# Patient Record
Sex: FEMALE | Race: WHITE | NOT HISPANIC OR LATINO | Employment: FULL TIME | URBAN - METROPOLITAN AREA
[De-identification: names, ages, dates, MRNs, and addresses within clinical notes are randomized per-mention and may not be internally consistent; named-entity substitution may affect disease eponyms.]

---

## 2018-05-02 ENCOUNTER — OFFICE VISIT (OUTPATIENT)
Dept: OBSTETRICS AND GYNECOLOGY | Facility: CLINIC | Age: 33
End: 2018-05-02
Payer: COMMERCIAL

## 2018-05-02 VITALS
SYSTOLIC BLOOD PRESSURE: 106 MMHG | DIASTOLIC BLOOD PRESSURE: 80 MMHG | WEIGHT: 127.19 LBS | BODY MASS INDEX: 21.71 KG/M2 | HEIGHT: 64 IN

## 2018-05-02 DIAGNOSIS — Z01.419 WELL WOMAN EXAM WITH ROUTINE GYNECOLOGICAL EXAM: Primary | ICD-10-CM

## 2018-05-02 DIAGNOSIS — Z11.3 SCREENING EXAMINATION FOR STD (SEXUALLY TRANSMITTED DISEASE): ICD-10-CM

## 2018-05-02 DIAGNOSIS — Z12.4 PAP SMEAR FOR CERVICAL CANCER SCREENING: ICD-10-CM

## 2018-05-02 PROCEDURE — 99385 PREV VISIT NEW AGE 18-39: CPT | Mod: S$GLB,,, | Performed by: OBSTETRICS & GYNECOLOGY

## 2018-05-02 PROCEDURE — 99999 PR PBB SHADOW E&M-NEW PATIENT-LVL II: CPT | Mod: PBBFAC,,, | Performed by: OBSTETRICS & GYNECOLOGY

## 2018-05-02 PROCEDURE — 87491 CHLMYD TRACH DNA AMP PROBE: CPT

## 2018-05-02 PROCEDURE — 88175 CYTOPATH C/V AUTO FLUID REDO: CPT

## 2018-05-02 RX ORDER — ESCITALOPRAM OXALATE 5 MG/1
TABLET ORAL
COMMUNITY
Start: 2018-04-23 | End: 2019-12-10

## 2018-05-02 NOTE — PROGRESS NOTES
Subjective:       Patient ID: Loren Levin is a 33 y.o. female.    Chief Complaint:  Well Woman      History of Present Illness  HPI  This 33 yr old P0 female is here for routine exam and she is new to me.  She is  and definitely not ready for pregnancy.  She has been on OCPs and just stopped as does not like the hormone effect.  She wants an IUD she is very familiar with LARCs and options and very sure.  No gyn or breast ca in her family.  She has never had an abnormal pap.  Has been couple of yrs since her last pap.  Same partner for 5 yrs.  She had gardasil. We discussed risks and benefits of IUDs and other LARCs    GYN & OB History  Patient's last menstrual period was 04/10/2018.   Date of Last Pap: No result found    OB History    Para Term  AB Living   1       1     SAB TAB Ectopic Multiple Live Births     1            # Outcome Date GA Lbr Rayo/2nd Weight Sex Delivery Anes PTL Lv   1 TAB                   Review of Systems  Review of Systems   Constitutional: Negative for chills and fever.   Respiratory: Negative for shortness of breath.    Cardiovascular: Negative for chest pain.   Gastrointestinal: Negative for abdominal pain, nausea and vomiting.   Genitourinary: Negative for difficulty urinating, dyspareunia, genital sores, menstrual problem, pelvic pain, vaginal bleeding, vaginal discharge and vaginal pain.   Skin: Negative for wound.   Hematological: Negative for adenopathy.           Objective:    Physical Exam:   Constitutional: She is oriented to person, place, and time. She appears well-developed and well-nourished.    HENT:   Head: Normocephalic.    Eyes: EOM are normal.    Neck: Normal range of motion.    Cardiovascular: Normal rate.     Pulmonary/Chest: Effort normal. She exhibits no mass and no tenderness. Right breast exhibits no inverted nipple, no mass, no skin change and no tenderness. Left breast exhibits no inverted nipple, no mass, no skin change and no tenderness.         Abdominal: Soft. She exhibits no distension. There is no tenderness.     Genitourinary: Vagina normal and uterus normal. There is no rash, tenderness or lesion on the right labia. There is no rash, tenderness or lesion on the left labia. Uterus is not tender. Cervix is normal. Right adnexum displays no mass, no tenderness and no fullness. Left adnexum displays no mass, no tenderness and no fullness. Cervix exhibits no discharge.           Musculoskeletal: Normal range of motion.       Neurological: She is alert and oriented to person, place, and time.    Skin: Skin is warm and dry.    Psychiatric: She has a normal mood and affect.          Assessment:        1. Well woman exam with routine gynecological exam    2. Pap smear for cervical cancer screening               Plan:      Routine pap and follow up for paragard insertion  Culture done

## 2018-05-03 LAB
C TRACH DNA SPEC QL NAA+PROBE: NOT DETECTED
N GONORRHOEA DNA SPEC QL NAA+PROBE: NOT DETECTED

## 2018-05-07 ENCOUNTER — TELEPHONE (OUTPATIENT)
Dept: OBSTETRICS AND GYNECOLOGY | Facility: CLINIC | Age: 33
End: 2018-05-07

## 2018-05-07 NOTE — TELEPHONE ENCOUNTER
----- Message from Hari Schreiber sent at 5/7/2018 12:42 PM CDT -----  Contact: RADHA LABOY [[id number]]  Name of Who is Calling: RADHA LABOY [[id number]]      What is the request in detail: Patient would like to speak with staff in regards to the denial of her IUD claim. State the insurance needs the proper medical documentation (provider service [phone])      Can the clinic reply by [facility]: no      What Number to Call Back if not in [facility]: [phone]

## 2018-05-15 ENCOUNTER — PROCEDURE VISIT (OUTPATIENT)
Dept: OBSTETRICS AND GYNECOLOGY | Facility: CLINIC | Age: 33
End: 2018-05-15
Attending: OBSTETRICS & GYNECOLOGY
Payer: COMMERCIAL

## 2018-05-15 VITALS
SYSTOLIC BLOOD PRESSURE: 102 MMHG | HEIGHT: 64 IN | BODY MASS INDEX: 22.47 KG/M2 | DIASTOLIC BLOOD PRESSURE: 70 MMHG | WEIGHT: 131.63 LBS

## 2018-05-15 DIAGNOSIS — Z30.014 ENCOUNTER FOR INITIAL PRESCRIPTION OF INTRAUTERINE CONTRACEPTIVE DEVICE (IUD): Primary | ICD-10-CM

## 2018-05-15 LAB
B-HCG UR QL: NEGATIVE
CTP QC/QA: YES

## 2018-05-15 PROCEDURE — 58300 INSERT INTRAUTERINE DEVICE: CPT | Mod: S$GLB,,, | Performed by: OBSTETRICS & GYNECOLOGY

## 2018-05-15 PROCEDURE — 81025 URINE PREGNANCY TEST: CPT | Mod: S$GLB,,, | Performed by: OBSTETRICS & GYNECOLOGY

## 2018-05-15 NOTE — PROCEDURES
Insertin of IUD-Today  Date/Time: 5/15/2018 4:56 PM  Performed by: HAYDE PARISH  Authorized by: HAYDE PARISH   Preparation: Patient was prepped and draped in the usual sterile fashion.  Local anesthesia used: no    Anesthesia:  Local anesthesia used: no    Sedation:  Patient sedated: no  Patient tolerance: Patient tolerated the procedure well with no immediate complications      informed consent obtained  UPT neg  Anterior lip of cervix grasped with single toothed tenaculum  paragard inserted to 8cm and deployed under sterile conditions  Strings cut  Instruments removed  Pt tolerated well  EBL  5cc  Follow up for string check

## 2018-05-16 ENCOUNTER — TELEPHONE (OUTPATIENT)
Dept: OBSTETRICS AND GYNECOLOGY | Facility: CLINIC | Age: 33
End: 2018-05-16

## 2018-05-16 NOTE — TELEPHONE ENCOUNTER
Spoke with patient and informed her that she has no restrictions. Patient has no further questions or complaints.

## 2018-05-16 NOTE — TELEPHONE ENCOUNTER
----- Message from Rachel Kevin sent at 5/15/2018  3:58 PM CDT -----  Contact: self  Pt wants to know when she can return to (riding her bike and doing yoga)?  She can be reached at [phone].

## 2019-02-13 ENCOUNTER — TELEPHONE (OUTPATIENT)
Dept: OBSTETRICS AND GYNECOLOGY | Facility: CLINIC | Age: 34
End: 2019-02-13

## 2019-02-13 NOTE — TELEPHONE ENCOUNTER
----- Message from Rohini Nazario sent at 2/13/2019 12:28 PM CST -----  Contact: pt   Name of Who is Calling: RADHA LABOY [[id number]]    What is the request in detail: Patient is requesting an appointment for removing her IUD and talk about hormonal  birthcontrol....Please contact to further discuss and advise      Can the clinic reply by [facility]: No     What Number to [facility] Back [facility]: [phone].

## 2019-03-20 ENCOUNTER — OFFICE VISIT (OUTPATIENT)
Dept: OBSTETRICS AND GYNECOLOGY | Facility: CLINIC | Age: 34
End: 2019-03-20
Payer: COMMERCIAL

## 2019-03-20 VITALS — SYSTOLIC BLOOD PRESSURE: 112 MMHG | DIASTOLIC BLOOD PRESSURE: 80 MMHG

## 2019-03-20 DIAGNOSIS — N94.3 PMS (PREMENSTRUAL SYNDROME): Primary | ICD-10-CM

## 2019-03-20 PROCEDURE — 99999 PR PBB SHADOW E&M-EST. PATIENT-LVL II: CPT | Mod: PBBFAC,,, | Performed by: OBSTETRICS & GYNECOLOGY

## 2019-03-20 PROCEDURE — 99214 OFFICE O/P EST MOD 30 MIN: CPT | Mod: S$GLB,,, | Performed by: OBSTETRICS & GYNECOLOGY

## 2019-03-20 PROCEDURE — 99999 PR PBB SHADOW E&M-EST. PATIENT-LVL II: ICD-10-PCS | Mod: PBBFAC,,, | Performed by: OBSTETRICS & GYNECOLOGY

## 2019-03-20 PROCEDURE — 99214 PR OFFICE/OUTPT VISIT, EST, LEVL IV, 30-39 MIN: ICD-10-PCS | Mod: S$GLB,,, | Performed by: OBSTETRICS & GYNECOLOGY

## 2019-03-20 NOTE — PROGRESS NOTES
Subjective:       Patient ID: Lorne Levin is a 34 y.o. female.    Chief Complaint:  Follow-up (week before cycle suffers with depression)      History of Present Illness  HPI  This 34 yr old female is here for discussion of her birth control.  She was off of OCPs for while but on lexapro but when we placed the paragard but she has weaned off since and feels crazy the 7 days before her cycle now and would like something done.   She is planning pregnancy in next yr or so .  We had a long discussion and is classic PMS.  We discussed for over 25 min and over 50% in counseling.  We discussed three options  1.  SSRI either restart lexapro or do prozac 10 mg 10 days before cycle  2.   OCPs  3.  Progesterone micronized 300 or more, or less         Pt desires to do the progesterone  GYN & OB History  No LMP recorded.   Date of Last Pap: 2018    OB History    Para Term  AB Living   1       1     SAB TAB Ectopic Multiple Live Births     1            # Outcome Date GA Lbr Rayo/2nd Weight Sex Delivery Anes PTL Lv   1 TAB                   Review of Systems  Review of Systems        Objective:   Physical Exam  Normal vaginal vault and external genitalia  IUD strings in place     Assessment:        1. PMS (premenstrual syndrome)               Plan:      Progesterone from [facility].  Start with 300 mg and may change  Follow up after 2 cycles

## 2019-09-12 ENCOUNTER — TELEPHONE (OUTPATIENT)
Dept: OBSTETRICS AND GYNECOLOGY | Facility: CLINIC | Age: 34
End: 2019-09-12

## 2019-09-12 NOTE — TELEPHONE ENCOUNTER
----- Message from Yanick Banuelos sent at 9/12/2019  8:58 AM CDT -----  Contact: RADHA LABOY [[id number]]  Name of Who is Calling: RADHA LABOY [[id number]]      What is the request in detail: Pt is requesting to speak with clinical staff. Pt is calling to schedule annual. Please contact to further discuss and advise.       Can the clinic reply by [facility]: No       What Number to Call Back if not in [facility]: [phone]

## 2019-11-19 ENCOUNTER — PATIENT MESSAGE (OUTPATIENT)
Dept: OBSTETRICS AND GYNECOLOGY | Facility: CLINIC | Age: 34
End: 2019-11-19

## 2019-12-09 ENCOUNTER — PATIENT MESSAGE (OUTPATIENT)
Dept: OBSTETRICS AND GYNECOLOGY | Facility: CLINIC | Age: 34
End: 2019-12-09

## 2019-12-10 ENCOUNTER — OFFICE VISIT (OUTPATIENT)
Dept: OBSTETRICS AND GYNECOLOGY | Facility: CLINIC | Age: 34
End: 2019-12-10
Attending: OBSTETRICS & GYNECOLOGY
Payer: COMMERCIAL

## 2019-12-10 VITALS
WEIGHT: 120.56 LBS | BODY MASS INDEX: 20.58 KG/M2 | DIASTOLIC BLOOD PRESSURE: 78 MMHG | HEIGHT: 64 IN | SYSTOLIC BLOOD PRESSURE: 118 MMHG

## 2019-12-10 DIAGNOSIS — Z30.432 ENCOUNTER FOR REMOVAL OF INTRAUTERINE CONTRACEPTIVE DEVICE (IUD): ICD-10-CM

## 2019-12-10 DIAGNOSIS — Z01.419 WELL WOMAN EXAM WITH ROUTINE GYNECOLOGICAL EXAM: Primary | ICD-10-CM

## 2019-12-10 PROCEDURE — 58301 REMOVE INTRAUTERINE DEVICE: CPT | Mod: S$GLB,,, | Performed by: OBSTETRICS & GYNECOLOGY

## 2019-12-10 PROCEDURE — 99395 PREV VISIT EST AGE 18-39: CPT | Mod: 25,S$GLB,, | Performed by: OBSTETRICS & GYNECOLOGY

## 2019-12-10 PROCEDURE — 58301 PR REMOVE, INTRAUTERINE DEVICE: ICD-10-PCS | Mod: S$GLB,,, | Performed by: OBSTETRICS & GYNECOLOGY

## 2019-12-10 PROCEDURE — 99395 PR PREVENTIVE VISIT,EST,18-39: ICD-10-PCS | Mod: 25,S$GLB,, | Performed by: OBSTETRICS & GYNECOLOGY

## 2019-12-10 NOTE — PROGRESS NOTES
Subjective:       Patient ID: Loern Levin is a 34 y.o. female.    Chief Complaint:  Well Woman (IUD removal)      History of Present Illness  HPI  This 34 yr old P0 female is here for WWE and desires pregnancy so wants her IUD Paragard removed.  She had normal pap last yr.  Never had abnormal. She is on progesterone 300mg nightly.  Will wean off of the progesterone over next month.   She has no other complaints    GYN & OB History  Patient's last menstrual period was 2019.   Date of Last Pap: 2018    OB History    Para Term  AB Living   1       1     SAB TAB Ectopic Multiple Live Births     1            # Outcome Date GA Lbr Rayo/2nd Weight Sex Delivery Anes PTL Lv   1 TAB                Review of Systems  Review of Systems   Constitutional: Negative for chills and fever.   Respiratory: Negative for shortness of breath.    Cardiovascular: Negative for chest pain.   Gastrointestinal: Negative for abdominal pain, nausea and vomiting.   Genitourinary: Negative for difficulty urinating, dyspareunia, genital sores, menstrual problem, pelvic pain, vaginal bleeding, vaginal discharge and vaginal pain.   Skin: Negative for wound.   Hematological: Negative for adenopathy.           Objective:   Physical Exam:   Constitutional: She is oriented to person, place, and time. She appears well-developed and well-nourished.    HENT:   Head: Normocephalic.    Eyes: EOM are normal.    Neck: Normal range of motion.    Cardiovascular: Normal rate.     Pulmonary/Chest: Effort normal. She exhibits no mass and no tenderness. Right breast exhibits no inverted nipple, no mass, no skin change and no tenderness. Left breast exhibits no inverted nipple, no mass, no skin change and no tenderness.        Abdominal: Soft. She exhibits no distension. There is no tenderness.     Genitourinary: Vagina normal and uterus normal. There is no rash, tenderness or lesion on the right labia. There is no rash, tenderness or lesion  on the left labia. Uterus is not tender. Cervix is normal. Right adnexum displays no mass, no tenderness and no fullness. Left adnexum displays no mass, no tenderness and no fullness. Cervix exhibits no discharge.   Genitourinary Comments: IUD strings identified and with ring forceps, the strings were grasped and the IUD was removed with no problems.  The paragard was intact and identified by both me and my nurse and the patient.  No complications and pt tolerated the procedure well.           Musculoskeletal: Normal range of motion.       Neurological: She is alert and oriented to person, place, and time.    Skin: Skin is warm and dry.    Psychiatric: She has a normal mood and affect.          Assessment:        1. Well woman exam with routine gynecological exam    2. Encounter for removal of intrauterine contraceptive device (IUD)               Plan:      Will wean off of progesterone in next 4 weeks.  If no pregnancy in the next 6 months let us know.  Follow up when pregnant and will refer to OB

## 2020-01-11 ENCOUNTER — PATIENT MESSAGE (OUTPATIENT)
Dept: OBSTETRICS AND GYNECOLOGY | Facility: CLINIC | Age: 35
End: 2020-01-11

## 2020-05-12 ENCOUNTER — TELEPHONE (OUTPATIENT)
Dept: OBSTETRICS AND GYNECOLOGY | Facility: CLINIC | Age: 35
End: 2020-05-12

## 2020-05-12 DIAGNOSIS — Z36.3 ENCOUNTER FOR ANTENATAL SCREENING FOR MALFORMATION USING ULTRASOUND: Primary | ICD-10-CM

## 2020-05-21 ENCOUNTER — TELEPHONE (OUTPATIENT)
Dept: OBSTETRICS AND GYNECOLOGY | Facility: CLINIC | Age: 35
End: 2020-05-21

## 2020-05-21 NOTE — TELEPHONE ENCOUNTER
----- Message from Agustin Gates sent at 5/21/2020  1:25 PM CDT -----  Please call new ob pt lmp was 04/10 she has a appt on 06/15 she is having some cramping [phone]

## 2020-05-21 NOTE — TELEPHONE ENCOUNTER
LMP 04/10/20 5 weeks complaints of cramping 1/10 today and on occasions 6/10. She denies vaginal bleeding or severe pain. I advised patient cramping in early pregnancy was normal and recommendation is to hydrate. I offered her an appointment Friday to evaluate before weekend. Patient was scheduled 05/22/20 @ 1PM

## 2020-05-22 ENCOUNTER — OFFICE VISIT (OUTPATIENT)
Dept: OBSTETRICS AND GYNECOLOGY | Facility: CLINIC | Age: 35
End: 2020-05-22
Attending: OBSTETRICS & GYNECOLOGY
Payer: COMMERCIAL

## 2020-05-22 ENCOUNTER — LAB VISIT (OUTPATIENT)
Dept: LAB | Facility: OTHER | Age: 35
End: 2020-05-22
Attending: OBSTETRICS & GYNECOLOGY
Payer: COMMERCIAL

## 2020-05-22 VITALS
BODY MASS INDEX: 20.14 KG/M2 | DIASTOLIC BLOOD PRESSURE: 70 MMHG | WEIGHT: 117.94 LBS | HEIGHT: 64 IN | SYSTOLIC BLOOD PRESSURE: 110 MMHG

## 2020-05-22 DIAGNOSIS — N91.4 SECONDARY OLIGOMENORRHEA: ICD-10-CM

## 2020-05-22 DIAGNOSIS — N91.4 SECONDARY OLIGOMENORRHEA: Primary | ICD-10-CM

## 2020-05-22 DIAGNOSIS — Z32.01 POSITIVE PREGNANCY TEST: ICD-10-CM

## 2020-05-22 LAB
ABO + RH BLD: NORMAL
B-HCG UR QL: POSITIVE
BACTERIA #/AREA URNS AUTO: ABNORMAL /HPF
BASOPHILS # BLD AUTO: 0.04 K/UL (ref 0–0.2)
BASOPHILS NFR BLD: 0.6 % (ref 0–1.9)
BILIRUB UR QL STRIP: NEGATIVE
BLD GP AB SCN CELLS X3 SERPL QL: NORMAL
CLARITY UR REFRACT.AUTO: ABNORMAL
COLOR UR AUTO: YELLOW
CTP QC/QA: YES
DIFFERENTIAL METHOD: ABNORMAL
EOSINOPHIL # BLD AUTO: 0.1 K/UL (ref 0–0.5)
EOSINOPHIL NFR BLD: 2.2 % (ref 0–8)
ERYTHROCYTE [DISTWIDTH] IN BLOOD BY AUTOMATED COUNT: 11.3 % (ref 11.5–14.5)
GLUCOSE UR QL STRIP: NEGATIVE
HCG INTACT+B SERPL-ACNC: NORMAL MIU/ML
HCT VFR BLD AUTO: 43.6 % (ref 37–48.5)
HGB BLD-MCNC: 14.4 G/DL (ref 12–16)
HGB UR QL STRIP: NEGATIVE
IMM GRANULOCYTES # BLD AUTO: 0.01 K/UL (ref 0–0.04)
IMM GRANULOCYTES NFR BLD AUTO: 0.2 % (ref 0–0.5)
KETONES UR QL STRIP: NEGATIVE
LEUKOCYTE ESTERASE UR QL STRIP: ABNORMAL
LYMPHOCYTES # BLD AUTO: 1.4 K/UL (ref 1–4.8)
LYMPHOCYTES NFR BLD: 22.3 % (ref 18–48)
MCH RBC QN AUTO: 30.3 PG (ref 27–31)
MCHC RBC AUTO-ENTMCNC: 33 G/DL (ref 32–36)
MCV RBC AUTO: 92 FL (ref 82–98)
MICROSCOPIC COMMENT: ABNORMAL
MONOCYTES # BLD AUTO: 0.5 K/UL (ref 0.3–1)
MONOCYTES NFR BLD: 8.1 % (ref 4–15)
NEUTROPHILS # BLD AUTO: 4.2 K/UL (ref 1.8–7.7)
NEUTROPHILS NFR BLD: 66.6 % (ref 38–73)
NITRITE UR QL STRIP: NEGATIVE
NRBC BLD-RTO: 0 /100 WBC
PH UR STRIP: 7 [PH] (ref 5–8)
PLATELET # BLD AUTO: 204 K/UL (ref 150–350)
PMV BLD AUTO: 11.7 FL (ref 9.2–12.9)
PROT UR QL STRIP: NEGATIVE
RBC # BLD AUTO: 4.76 M/UL (ref 4–5.4)
RBC #/AREA URNS AUTO: 1 /HPF (ref 0–4)
SP GR UR STRIP: 1.01 (ref 1–1.03)
SQUAMOUS #/AREA URNS AUTO: 4 /HPF
URN SPEC COLLECT METH UR: ABNORMAL
WBC # BLD AUTO: 6.29 K/UL (ref 3.9–12.7)
WBC #/AREA URNS AUTO: 18 /HPF (ref 0–5)

## 2020-05-22 PROCEDURE — 85025 COMPLETE CBC W/AUTO DIFF WBC: CPT

## 2020-05-22 PROCEDURE — 81001 URINALYSIS AUTO W/SCOPE: CPT

## 2020-05-22 PROCEDURE — 87088 URINE BACTERIA CULTURE: CPT

## 2020-05-22 PROCEDURE — 86850 RBC ANTIBODY SCREEN: CPT

## 2020-05-22 PROCEDURE — 81025 URINE PREGNANCY TEST: CPT | Mod: S$GLB,,, | Performed by: OBSTETRICS & GYNECOLOGY

## 2020-05-22 PROCEDURE — 87340 HEPATITIS B SURFACE AG IA: CPT

## 2020-05-22 PROCEDURE — 81025 POCT URINE PREGNANCY: ICD-10-PCS | Mod: S$GLB,,, | Performed by: OBSTETRICS & GYNECOLOGY

## 2020-05-22 PROCEDURE — 3008F BODY MASS INDEX DOCD: CPT | Mod: CPTII,S$GLB,, | Performed by: OBSTETRICS & GYNECOLOGY

## 2020-05-22 PROCEDURE — 84144 ASSAY OF PROGESTERONE: CPT

## 2020-05-22 PROCEDURE — 99999 PR PBB SHADOW E&M-EST. PATIENT-LVL III: CPT | Mod: PBBFAC,,, | Performed by: OBSTETRICS & GYNECOLOGY

## 2020-05-22 PROCEDURE — 87086 URINE CULTURE/COLONY COUNT: CPT

## 2020-05-22 PROCEDURE — 87491 CHLMYD TRACH DNA AMP PROBE: CPT

## 2020-05-22 PROCEDURE — 99214 PR OFFICE/OUTPT VISIT, EST, LEVL IV, 30-39 MIN: ICD-10-PCS | Mod: S$GLB,,, | Performed by: OBSTETRICS & GYNECOLOGY

## 2020-05-22 PROCEDURE — 86703 HIV-1/HIV-2 1 RESULT ANTBDY: CPT

## 2020-05-22 PROCEDURE — 86762 RUBELLA ANTIBODY: CPT

## 2020-05-22 PROCEDURE — 84702 CHORIONIC GONADOTROPIN TEST: CPT

## 2020-05-22 PROCEDURE — 99214 OFFICE O/P EST MOD 30 MIN: CPT | Mod: S$GLB,,, | Performed by: OBSTETRICS & GYNECOLOGY

## 2020-05-22 PROCEDURE — 36415 COLL VENOUS BLD VENIPUNCTURE: CPT

## 2020-05-22 PROCEDURE — 99999 PR PBB SHADOW E&M-EST. PATIENT-LVL III: ICD-10-PCS | Mod: PBBFAC,,, | Performed by: OBSTETRICS & GYNECOLOGY

## 2020-05-22 PROCEDURE — 3008F PR BODY MASS INDEX (BMI) DOCUMENTED: ICD-10-PCS | Mod: CPTII,S$GLB,, | Performed by: OBSTETRICS & GYNECOLOGY

## 2020-05-22 PROCEDURE — 86592 SYPHILIS TEST NON-TREP QUAL: CPT

## 2020-05-22 NOTE — PROGRESS NOTES
SUBJECTIVE:   35 y.o. female   for missed period. Patient's last menstrual period was 04/10/2020 (approximate)..  She normally has regular periods.  Not using contraception.  Had + home UPT.  She reports some cramping, but no VB.  Wanted to be seen for evaluation.       UPT+  EGA 6 wga  EDC 1-    History reviewed. No pertinent past medical history.  Past Surgical History:   Procedure Laterality Date    NO PAST SURGERIES       Social History     Socioeconomic History    Marital status:      Spouse name: Not on file    Number of children: Not on file    Years of education: Not on file    Highest education level: Not on file   Occupational History    Not on file   Social Needs    Financial resource strain: Not on file    Food insecurity:     Worry: Not on file     Inability: Not on file    Transportation needs:     Medical: Not on file     Non-medical: Not on file   Tobacco Use    Smoking status: Never Smoker    Smokeless tobacco: Never Used   Substance and Sexual Activity    Alcohol use: Yes     Comment: social/ pregnancy    Drug use: No    Sexual activity: Yes     Partners: Male   Lifestyle    Physical activity:     Days per week: Not on file     Minutes per session: Not on file    Stress: Not on file   Relationships    Social connections:     Talks on phone: Not on file     Gets together: Not on file     Attends [employer] service: Not on file     Active member of club or organization: Not on file     Attends meetings of clubs or organizations: Not on file     Relationship status: Not on file   Other Topics Concern    Not on file   Social History Narrative    Not on file     Family History   Problem Relation Age of Onset    Breast cancer Neg Hx     Colon cancer Neg Hx     Ovarian cancer Neg Hx      OB History    Para Term  AB Living   2       1     SAB TAB Ectopic Multiple Live Births     1            # Outcome Date GA Lbr Rayo/2nd Weight Sex Delivery Anes PTL  "Lv   2 Current            1 TAB 07/12/10 6w0d                No current outpatient medications on file.     No current facility-administered medications for this visit.      Allergies: Patient has no known allergies.     ROS:  Constitutional: no weight loss, weight gain, fever, fatigue  Eyes:  No vision changes, glasses/contacts  ENT/Mouth: No ulcers, sinus problems, ears ringing, headache  Cardiovascular: No inability to lie flat, chest pain, exercise intolerance, swelling, heart palpitations  Respiratory: No wheezing, coughing blood, shortness of breath, or cough  Gastrointestinal: No diarrhea, bloody stool, nausea/vomiting, constipation, gas, hemorrhoids  Genitourinary: No blood in urine, painful urination, urgency of urination, frequency of urination, incomplete emptying, incontinence, painful periods, heavy periods, vaginal discharge, vaginal odor, painful intercourse, sexual problems, bleeding after intercourse.  Musculoskeletal: No muscle weakness  Skin/Breast: No painful breasts, nipple discharge, masses, rash, ulcers  Neurological: No passing out, seizures, numbness, headache  Endocrine: No diabetes, hypothyroid, hyperthyroid, hot flashes, hair loss, abnormal hair growth, ance  Psychiatric: No depression, crying  Hematologic: No bruises, bleeding, swollen lymph nodes, anemia.      OBJECTIVE:   The patient appears well, alert, oriented x 3, in no distress.  /70 (BP Location: Right arm, Patient Position: Sitting, BP Method: Medium (Manual))   Ht 5' 4" (1.626 m)   Wt 53.5 kg (117 lb 15.1 oz)   LMP 04/10/2020 (Approximate)   BMI 20.25 kg/m²   NECK: no thyromegaly, trachea midline  SKIN: no acne, striae, hirsutism  CHEST: CTAB  CV: RRR  BREAST EXAM: breasts appear normal, no suspicious masses, no skin or nipple changes or axillary nodes  ABDOMEN: no hernias, masses, or hepatosplenomegaly  GENITALIA: normal external genitalia, no erythema, no discharge  URETHRA: normal urethra, normal urethral " meatus  VAGINA: Normal  CERVIX: no lesions or cervical motion tenderness  UTERUS: normal size, contour, position, consistency, mobility, non-tender  ADNEXA: no mass, fullness, tenderness      ASSESSMENT:   1. Secondary oligomenorrhea  Urinalysis    Urine culture    CBC auto differential    Type & Screen - Ob Profile    Rubella Antibody, IgG    HIV 1/2 Ag/Ab (4th Gen)    Hepatitis B Surface Antigen    RPR    C. trachomatis/N. gonorrhoeae by AMP DNA    Progesterone    hCG, quantitative   2. Positive pregnancy test  Urinalysis    Urine culture    CBC auto differential    Type & Screen - Ob Profile    Rubella Antibody, IgG    HIV 1/2 Ag/Ab (4th Gen)    Hepatitis B Surface Antigen    RPR    C. trachomatis/N. gonorrhoeae by AMP DNA    Progesterone    hCG, quantitative    POCT urine pregnancy       PLAN:   Orders Placed This Encounter    Urine culture    C. trachomatis/N. gonorrhoeae by AMP DNA    Urinalysis    CBC auto differential    Rubella Antibody, IgG    HIV 1/2 Ag/Ab (4th Gen)    Hepatitis B Surface Antigen    RPR    Progesterone    hCG, quantitative    Urinalysis Microscopic    POCT urine pregnancy    Type & Screen - Ob Profile     Discussed new OB, ER precautions, PNV, diet, OB labs, u/s, AMA, optional genetic testing.  RTC 4 weeks

## 2020-05-23 LAB — PROGEST SERPL-MCNC: 14.6 NG/ML

## 2020-05-24 LAB — BACTERIA UR CULT: ABNORMAL

## 2020-05-25 ENCOUNTER — PATIENT MESSAGE (OUTPATIENT)
Dept: OBSTETRICS AND GYNECOLOGY | Facility: CLINIC | Age: 35
End: 2020-05-25

## 2020-05-25 LAB
C TRACH DNA SPEC QL NAA+PROBE: NOT DETECTED
HBV SURFACE AG SERPL QL IA: NEGATIVE
HIV 1+2 AB+HIV1 P24 AG SERPL QL IA: NEGATIVE
N GONORRHOEA DNA SPEC QL NAA+PROBE: NOT DETECTED
RPR SER QL: NORMAL
RUBV IGG SER-ACNC: 22.3 IU/ML
RUBV IGG SER-IMP: REACTIVE

## 2020-05-26 ENCOUNTER — TELEPHONE (OUTPATIENT)
Dept: OBSTETRICS AND GYNECOLOGY | Facility: CLINIC | Age: 35
End: 2020-05-26

## 2020-05-26 DIAGNOSIS — Z34.90 PREGNANCY, UNSPECIFIED GESTATIONAL AGE: Primary | ICD-10-CM

## 2020-06-11 ENCOUNTER — INITIAL PRENATAL (OUTPATIENT)
Dept: OBSTETRICS AND GYNECOLOGY | Facility: CLINIC | Age: 35
End: 2020-06-11
Attending: STUDENT IN AN ORGANIZED HEALTH CARE EDUCATION/TRAINING PROGRAM
Payer: COMMERCIAL

## 2020-06-11 ENCOUNTER — PROCEDURE VISIT (OUTPATIENT)
Dept: OBSTETRICS AND GYNECOLOGY | Facility: CLINIC | Age: 35
End: 2020-06-11
Payer: COMMERCIAL

## 2020-06-11 VITALS
DIASTOLIC BLOOD PRESSURE: 66 MMHG | WEIGHT: 117.94 LBS | BODY MASS INDEX: 20.25 KG/M2 | SYSTOLIC BLOOD PRESSURE: 104 MMHG

## 2020-06-11 DIAGNOSIS — Z34.90 PREGNANCY, UNSPECIFIED GESTATIONAL AGE: ICD-10-CM

## 2020-06-11 DIAGNOSIS — Z36.89 ESTABLISH GESTATIONAL AGE, ULTRASOUND: ICD-10-CM

## 2020-06-11 DIAGNOSIS — Z34.90 PREGNANCY, UNSPECIFIED GESTATIONAL AGE: Primary | ICD-10-CM

## 2020-06-11 PROCEDURE — 76801 PR US, OB <14WKS, TRANSABD, SINGLE GESTATION: ICD-10-PCS | Mod: S$GLB,,, | Performed by: OBSTETRICS & GYNECOLOGY

## 2020-06-11 PROCEDURE — 76801 OB US < 14 WKS SINGLE FETUS: CPT | Mod: S$GLB,,, | Performed by: OBSTETRICS & GYNECOLOGY

## 2020-06-11 PROCEDURE — 99999 PR PBB SHADOW E&M-EST. PATIENT-LVL III: ICD-10-PCS | Mod: PBBFAC,,, | Performed by: STUDENT IN AN ORGANIZED HEALTH CARE EDUCATION/TRAINING PROGRAM

## 2020-06-11 PROCEDURE — 99999 PR PBB SHADOW E&M-EST. PATIENT-LVL III: CPT | Mod: PBBFAC,,, | Performed by: STUDENT IN AN ORGANIZED HEALTH CARE EDUCATION/TRAINING PROGRAM

## 2020-06-11 PROCEDURE — 0500F PR INITIAL PRENATAL CARE VISIT: ICD-10-PCS | Mod: S$GLB,,, | Performed by: STUDENT IN AN ORGANIZED HEALTH CARE EDUCATION/TRAINING PROGRAM

## 2020-06-11 PROCEDURE — 0500F INITIAL PRENATAL CARE VISIT: CPT | Mod: S$GLB,,, | Performed by: STUDENT IN AN ORGANIZED HEALTH CARE EDUCATION/TRAINING PROGRAM

## 2020-06-12 ENCOUNTER — PATIENT MESSAGE (OUTPATIENT)
Dept: OBSTETRICS AND GYNECOLOGY | Facility: CLINIC | Age: 35
End: 2020-06-12

## 2020-06-15 ENCOUNTER — PATIENT MESSAGE (OUTPATIENT)
Dept: OBSTETRICS AND GYNECOLOGY | Facility: CLINIC | Age: 35
End: 2020-06-15

## 2020-06-18 ENCOUNTER — PATIENT MESSAGE (OUTPATIENT)
Dept: OBSTETRICS AND GYNECOLOGY | Facility: CLINIC | Age: 35
End: 2020-06-18

## 2020-06-18 DIAGNOSIS — R35.0 URINE FREQUENCY: ICD-10-CM

## 2020-06-18 DIAGNOSIS — R30.0 DYSURIA: Primary | ICD-10-CM

## 2020-06-19 ENCOUNTER — TELEPHONE (OUTPATIENT)
Dept: OBSTETRICS AND GYNECOLOGY | Facility: CLINIC | Age: 35
End: 2020-06-19

## 2020-06-19 NOTE — TELEPHONE ENCOUNTER
Spoke with the patient. Informed her that the orders are not electronic and that she would have to  orders, Informed the patient that I will be at the [facility] location on Tuesday, June 23rd. Patient voiced understanding and will  orders then.

## 2020-06-23 ENCOUNTER — LAB VISIT (OUTPATIENT)
Dept: LAB | Facility: OTHER | Age: 35
End: 2020-06-23
Attending: STUDENT IN AN ORGANIZED HEALTH CARE EDUCATION/TRAINING PROGRAM
Payer: COMMERCIAL

## 2020-06-23 ENCOUNTER — PATIENT MESSAGE (OUTPATIENT)
Dept: OBSTETRICS AND GYNECOLOGY | Facility: CLINIC | Age: 35
End: 2020-06-23

## 2020-06-23 DIAGNOSIS — R35.0 URINE FREQUENCY: ICD-10-CM

## 2020-06-23 DIAGNOSIS — R30.0 DYSURIA: ICD-10-CM

## 2020-06-23 PROCEDURE — 87086 URINE CULTURE/COLONY COUNT: CPT

## 2020-06-23 RX ORDER — NITROFURANTOIN 25; 75 MG/1; MG/1
100 CAPSULE ORAL 2 TIMES DAILY
Qty: 14 CAPSULE | Refills: 0 | Status: SHIPPED | OUTPATIENT
Start: 2020-06-23 | End: 2020-06-30

## 2020-06-23 NOTE — TELEPHONE ENCOUNTER
Pt has UTI and dropping of urine sample today.    Allergies and pharmacy up to date.    Macrobid pended.

## 2020-06-24 LAB — BACTERIA UR CULT: NO GROWTH

## 2020-07-09 ENCOUNTER — ROUTINE PRENATAL (OUTPATIENT)
Dept: OBSTETRICS AND GYNECOLOGY | Facility: CLINIC | Age: 35
End: 2020-07-09
Attending: STUDENT IN AN ORGANIZED HEALTH CARE EDUCATION/TRAINING PROGRAM
Payer: COMMERCIAL

## 2020-07-09 VITALS — SYSTOLIC BLOOD PRESSURE: 110 MMHG | DIASTOLIC BLOOD PRESSURE: 76 MMHG | WEIGHT: 122.38 LBS | BODY MASS INDEX: 21 KG/M2

## 2020-07-09 DIAGNOSIS — Z3A.12 12 WEEKS GESTATION OF PREGNANCY: Primary | ICD-10-CM

## 2020-07-09 PROCEDURE — 0502F SUBSEQUENT PRENATAL CARE: CPT | Mod: CPTII,S$GLB,, | Performed by: STUDENT IN AN ORGANIZED HEALTH CARE EDUCATION/TRAINING PROGRAM

## 2020-07-09 PROCEDURE — 99999 PR PBB SHADOW E&M-EST. PATIENT-LVL III: CPT | Mod: PBBFAC,,, | Performed by: STUDENT IN AN ORGANIZED HEALTH CARE EDUCATION/TRAINING PROGRAM

## 2020-07-09 PROCEDURE — 99999 PR PBB SHADOW E&M-EST. PATIENT-LVL III: ICD-10-PCS | Mod: PBBFAC,,, | Performed by: STUDENT IN AN ORGANIZED HEALTH CARE EDUCATION/TRAINING PROGRAM

## 2020-07-09 PROCEDURE — 0502F PR SUBSEQUENT PRENATAL CARE: ICD-10-PCS | Mod: CPTII,S$GLB,, | Performed by: STUDENT IN AN ORGANIZED HEALTH CARE EDUCATION/TRAINING PROGRAM

## 2020-07-09 NOTE — PROGRESS NOTES
Patient reports migraine headaches with blurred vision/spots x 2 weeks. Reports normal BP readings at home.

## 2020-07-10 NOTE — PROGRESS NOTES
Doing well.  No cramping or bleeding.  Discussed migraines for headaches - if worsens will get her into Neurology.  Interested in [facility] - will call and discuss with them.  All questions answered.  RTC in 4 weeks or sooner if needed.

## 2020-07-13 ENCOUNTER — TELEPHONE (OUTPATIENT)
Dept: OBSTETRICS AND GYNECOLOGY | Facility: CLINIC | Age: 35
End: 2020-07-13

## 2020-08-06 ENCOUNTER — PATIENT MESSAGE (OUTPATIENT)
Dept: OBSTETRICS AND GYNECOLOGY | Facility: CLINIC | Age: 35
End: 2020-08-06

## 2020-08-06 DIAGNOSIS — R19.7 DIARRHEA: ICD-10-CM

## 2020-08-10 ENCOUNTER — TELEPHONE (OUTPATIENT)
Dept: OBSTETRICS AND GYNECOLOGY | Facility: CLINIC | Age: 35
End: 2020-08-10

## 2020-08-10 ENCOUNTER — PATIENT MESSAGE (OUTPATIENT)
Dept: OBSTETRICS AND GYNECOLOGY | Facility: CLINIC | Age: 35
End: 2020-08-10

## 2020-08-10 NOTE — TELEPHONE ENCOUNTER
Pt experiencing diarrhea since 8/6. Was wondering if she should get covid test. She denied any other covid symptoms.  Monitor and maintain hydration? Or did you want covid test?    Has appt tomorrow 8/11

## 2020-08-10 NOTE — TELEPHONE ENCOUNTER
Sure happy to get her tested.  She will need to reschedule her appt if it is not back by tomorrow.  I would have her rest and hydrate as well.  How many episodes is she having?  Any emesis?  Able to keep things down?  I would have her try a bland diet - toast, rice, broth, fluids while this is resolving.  Thanks so much!

## 2020-08-10 NOTE — TELEPHONE ENCOUNTER
I would go ahead and do the testing.  Let's just make sure we are not missing anything since it has been going on for so long.  We can then get her in to see me next week.  Does she need me to call her?  Thanks so much!

## 2020-08-11 ENCOUNTER — PATIENT MESSAGE (OUTPATIENT)
Dept: OBSTETRICS AND GYNECOLOGY | Facility: CLINIC | Age: 35
End: 2020-08-11

## 2020-08-11 NOTE — TELEPHONE ENCOUNTER
Called patient to reschedule appointment to next week. Patient states she will return call to reschedule, currently in a zoom meeting.

## 2020-08-11 NOTE — TELEPHONE ENCOUNTER
Spoke with patient.  She is feeling better now and symptoms have resolved.  Was not able to get COVID testing at urgent care.  She will try at [facility] otherwise will continue to monitor and avoid contact.s  She will call if symptoms return and then can pursue further testing.  All questions answered.

## 2020-08-18 ENCOUNTER — ROUTINE PRENATAL (OUTPATIENT)
Dept: OBSTETRICS AND GYNECOLOGY | Facility: CLINIC | Age: 35
End: 2020-08-18
Attending: STUDENT IN AN ORGANIZED HEALTH CARE EDUCATION/TRAINING PROGRAM
Payer: COMMERCIAL

## 2020-08-18 ENCOUNTER — LAB VISIT (OUTPATIENT)
Dept: LAB | Facility: OTHER | Age: 35
End: 2020-08-18
Attending: STUDENT IN AN ORGANIZED HEALTH CARE EDUCATION/TRAINING PROGRAM
Payer: COMMERCIAL

## 2020-08-18 VITALS
DIASTOLIC BLOOD PRESSURE: 72 MMHG | BODY MASS INDEX: 21.51 KG/M2 | WEIGHT: 125.31 LBS | SYSTOLIC BLOOD PRESSURE: 108 MMHG

## 2020-08-18 DIAGNOSIS — R19.7 DIARRHEA, UNSPECIFIED TYPE: ICD-10-CM

## 2020-08-18 DIAGNOSIS — R19.7 DIARRHEA, UNSPECIFIED TYPE: Primary | ICD-10-CM

## 2020-08-18 LAB
ALBUMIN SERPL BCP-MCNC: 3 G/DL (ref 3.5–5.2)
ALP SERPL-CCNC: 50 U/L (ref 55–135)
ALT SERPL W/O P-5'-P-CCNC: 11 U/L (ref 10–44)
ANION GAP SERPL CALC-SCNC: 9 MMOL/L (ref 8–16)
AST SERPL-CCNC: 37 U/L (ref 10–40)
BASOPHILS # BLD AUTO: 0.03 K/UL (ref 0–0.2)
BASOPHILS NFR BLD: 0.3 % (ref 0–1.9)
BILIRUB SERPL-MCNC: 0.3 MG/DL (ref 0.1–1)
BUN SERPL-MCNC: 17 MG/DL (ref 6–20)
CALCIUM SERPL-MCNC: 8.6 MG/DL (ref 8.7–10.5)
CHLORIDE SERPL-SCNC: 105 MMOL/L (ref 95–110)
CO2 SERPL-SCNC: 22 MMOL/L (ref 23–29)
CREAT SERPL-MCNC: 0.6 MG/DL (ref 0.5–1.4)
DIFFERENTIAL METHOD: ABNORMAL
EOSINOPHIL # BLD AUTO: 0.1 K/UL (ref 0–0.5)
EOSINOPHIL NFR BLD: 1.2 % (ref 0–8)
ERYTHROCYTE [DISTWIDTH] IN BLOOD BY AUTOMATED COUNT: 12.4 % (ref 11.5–14.5)
EST. GFR  (AFRICAN AMERICAN): >60 ML/MIN/1.73 M^2
EST. GFR  (NON AFRICAN AMERICAN): >60 ML/MIN/1.73 M^2
GLUCOSE SERPL-MCNC: 71 MG/DL (ref 70–110)
HCT VFR BLD AUTO: 35.5 % (ref 37–48.5)
HGB BLD-MCNC: 12.1 G/DL (ref 12–16)
IMM GRANULOCYTES # BLD AUTO: 0.02 K/UL (ref 0–0.04)
IMM GRANULOCYTES NFR BLD AUTO: 0.2 % (ref 0–0.5)
LYMPHOCYTES # BLD AUTO: 1 K/UL (ref 1–4.8)
LYMPHOCYTES NFR BLD: 11.1 % (ref 18–48)
MCH RBC QN AUTO: 31.8 PG (ref 27–31)
MCHC RBC AUTO-ENTMCNC: 34.1 G/DL (ref 32–36)
MCV RBC AUTO: 93 FL (ref 82–98)
MONOCYTES # BLD AUTO: 0.6 K/UL (ref 0.3–1)
MONOCYTES NFR BLD: 6.3 % (ref 4–15)
NEUTROPHILS # BLD AUTO: 7.6 K/UL (ref 1.8–7.7)
NEUTROPHILS NFR BLD: 80.9 % (ref 38–73)
NRBC BLD-RTO: 0 /100 WBC
PLATELET # BLD AUTO: 189 K/UL (ref 150–350)
PMV BLD AUTO: 11.3 FL (ref 9.2–12.9)
POTASSIUM SERPL-SCNC: 4 MMOL/L (ref 3.5–5.1)
PROT SERPL-MCNC: 6.3 G/DL (ref 6–8.4)
RBC # BLD AUTO: 3.8 M/UL (ref 4–5.4)
SODIUM SERPL-SCNC: 136 MMOL/L (ref 136–145)
WBC # BLD AUTO: 9.34 K/UL (ref 3.9–12.7)

## 2020-08-18 PROCEDURE — 36415 COLL VENOUS BLD VENIPUNCTURE: CPT

## 2020-08-18 PROCEDURE — 99999 PR PBB SHADOW E&M-EST. PATIENT-LVL III: CPT | Mod: PBBFAC,,, | Performed by: STUDENT IN AN ORGANIZED HEALTH CARE EDUCATION/TRAINING PROGRAM

## 2020-08-18 PROCEDURE — 0502F SUBSEQUENT PRENATAL CARE: CPT | Mod: CPTII,S$GLB,, | Performed by: STUDENT IN AN ORGANIZED HEALTH CARE EDUCATION/TRAINING PROGRAM

## 2020-08-18 PROCEDURE — 80053 COMPREHEN METABOLIC PANEL: CPT

## 2020-08-18 PROCEDURE — 0502F PR SUBSEQUENT PRENATAL CARE: ICD-10-PCS | Mod: CPTII,S$GLB,, | Performed by: STUDENT IN AN ORGANIZED HEALTH CARE EDUCATION/TRAINING PROGRAM

## 2020-08-18 PROCEDURE — 85025 COMPLETE CBC W/AUTO DIFF WBC: CPT

## 2020-08-18 PROCEDURE — 99999 PR PBB SHADOW E&M-EST. PATIENT-LVL III: ICD-10-PCS | Mod: PBBFAC,,, | Performed by: STUDENT IN AN ORGANIZED HEALTH CARE EDUCATION/TRAINING PROGRAM

## 2020-08-19 ENCOUNTER — LAB VISIT (OUTPATIENT)
Dept: LAB | Facility: OTHER | Age: 35
End: 2020-08-19
Attending: STUDENT IN AN ORGANIZED HEALTH CARE EDUCATION/TRAINING PROGRAM
Payer: COMMERCIAL

## 2020-08-19 DIAGNOSIS — R19.7 DIARRHEA, UNSPECIFIED TYPE: ICD-10-CM

## 2020-08-19 LAB — OB PNL STL: NEGATIVE

## 2020-08-19 PROCEDURE — 87045 FECES CULTURE AEROBIC BACT: CPT

## 2020-08-19 PROCEDURE — 87329 GIARDIA AG IA: CPT

## 2020-08-19 PROCEDURE — 87209 SMEAR COMPLEX STAIN: CPT

## 2020-08-19 PROCEDURE — 87046 STOOL CULTR AEROBIC BACT EA: CPT | Mod: 59

## 2020-08-19 PROCEDURE — 89055 LEUKOCYTE ASSESSMENT FECAL: CPT

## 2020-08-19 PROCEDURE — 87449 NOS EACH ORGANISM AG IA: CPT

## 2020-08-19 PROCEDURE — 87324 CLOSTRIDIUM AG IA: CPT

## 2020-08-19 PROCEDURE — 82272 OCCULT BLD FECES 1-3 TESTS: CPT

## 2020-08-19 PROCEDURE — 87427 SHIGA-LIKE TOXIN AG IA: CPT | Mod: 59

## 2020-08-19 PROCEDURE — 87425 ROTAVIRUS AG IA: CPT

## 2020-08-19 NOTE — PROGRESS NOTES
Doing well.  Still with some diarrhea.  COVID negative.  Going on for 2 weeks.  Denies any triggers.  Will get labs and stool studies.  She is also interested in [facility] - will call about appt.  Anatomy scheduled.  All questions answered.  ED precautions reviewed.  RTC in 4 weeks or sooner if needed.

## 2020-08-20 ENCOUNTER — TELEPHONE (OUTPATIENT)
Dept: OBSTETRICS AND GYNECOLOGY | Facility: CLINIC | Age: 35
End: 2020-08-20

## 2020-08-20 LAB
C DIFF GDH STL QL: NEGATIVE
C DIFF TOX A+B STL QL IA: NEGATIVE
CRYPTOSP AG STL QL IA: NEGATIVE
G LAMBLIA AG STL QL IA: NEGATIVE
RV AG STL QL IA.RAPID: NEGATIVE
WBC #/AREA STL HPF: NORMAL /[HPF]

## 2020-08-21 ENCOUNTER — PATIENT MESSAGE (OUTPATIENT)
Dept: OBSTETRICS AND GYNECOLOGY | Facility: CLINIC | Age: 35
End: 2020-08-21

## 2020-08-21 DIAGNOSIS — Z3A.19 19 WEEKS GESTATION OF PREGNANCY: Primary | ICD-10-CM

## 2020-08-21 LAB
E COLI SXT1 STL QL IA: NEGATIVE
E COLI SXT2 STL QL IA: NEGATIVE

## 2020-08-22 LAB — O+P STL MICRO: NORMAL

## 2020-08-24 LAB — BACTERIA STL CULT: NORMAL

## 2020-08-25 ENCOUNTER — INITIAL PRENATAL (OUTPATIENT)
Dept: OBSTETRICS AND GYNECOLOGY | Facility: CLINIC | Age: 35
End: 2020-08-25
Payer: COMMERCIAL

## 2020-08-25 VITALS — DIASTOLIC BLOOD PRESSURE: 60 MMHG | SYSTOLIC BLOOD PRESSURE: 92 MMHG | WEIGHT: 128.19 LBS | BODY MASS INDEX: 22.01 KG/M2

## 2020-08-25 DIAGNOSIS — Z34.90 PREGNANCY WITH ONE FETUS, ANTEPARTUM: ICD-10-CM

## 2020-08-25 PROCEDURE — 99999 PR PBB SHADOW E&M-EST. PATIENT-LVL III: CPT | Mod: PBBFAC,,, | Performed by: ADVANCED PRACTICE MIDWIFE

## 2020-08-25 PROCEDURE — 0500F PR INITIAL PRENATAL CARE VISIT: ICD-10-PCS | Mod: S$GLB,,, | Performed by: ADVANCED PRACTICE MIDWIFE

## 2020-08-25 PROCEDURE — 99999 PR PBB SHADOW E&M-EST. PATIENT-LVL III: ICD-10-PCS | Mod: PBBFAC,,, | Performed by: ADVANCED PRACTICE MIDWIFE

## 2020-08-25 PROCEDURE — 0500F INITIAL PRENATAL CARE VISIT: CPT | Mod: S$GLB,,, | Performed by: ADVANCED PRACTICE MIDWIFE

## 2020-08-25 RX ORDER — MULTIVIT WITH MINERALS/HERBS
1 TABLET ORAL DAILY
COMMUNITY
End: 2022-10-17

## 2020-08-25 RX ORDER — DIPHENHYDRAMINE HCL 50 MG
50 CAPSULE ORAL EVERY 6 HOURS PRN
COMMUNITY
End: 2021-03-16

## 2020-08-25 RX ORDER — AMOXICILLIN 500 MG
CAPSULE ORAL DAILY
COMMUNITY
End: 2022-10-17

## 2020-08-25 NOTE — PROGRESS NOTES
Chief Complaint   Patient presents with    Initial Prenatal Visit       35 y.o., at 19w4d by Estimated Date of Delivery: 1/15/21    Complaints today: Here alone. Doing well.  Had migraines headaches, in  2- week, it resolved with Mg+ supplement.  Had an episode diarrhea in first and second week of August. Had labs drawn and cut out dairy and it is getting better now. Neg Covid.  Interested in water birth  TW lbs    ROS  OBSTETRICS:   Contractions No   Bleeding No   Loss of fluid No   Fetal mvmnt Not yet  GASTRO:   Nausea No   Vomiting No      OB History    Para Term  AB Living   2       1     SAB TAB Ectopic Multiple Live Births     1            # Outcome Date GA Lbr Rayo/2nd Weight Sex Delivery Anes PTL Lv   2 Current            1 TAB 07/12/10 6w0d             Obstetric Comments   Menarche age 12       Dating reviewed  Allergies and problem list reviewed and updated  Medical and surgical history reviewed  Prenatal labs reviewed and updated    PHYSICAL EXAM  Wt 58.2 kg (128 lb 3.2 oz)   LMP 04/10/2020 (Approximate)   BMI 22.01 kg/m²     GENERAL: No acute distress  HEENT: Normocephalic, atraumatic  NEURO: Alert and oriented x3  PSYCH: Normal mood and affect  PULMONARY: Non-labored respiration; no tachypnea  ABD: Soft, gravid, nontender; no hernia or hepatosplenomegaly  FH = umbilicus    ASSESSMENT AND PLAN    pregnancy Problems (from 20 to present)     No problems associated with this episode.            Reviewed anatomy ultrasound  Reviewed wt gain parameters for ABC, along with exercise/diet recommendations in pregnancy.  Encouraged prenatal education classes - schedule given.  Education regarding  labor warning s/s.  Confirmed after-hours number given to patient.    Reviewed warning signs, normal FM, and how/when to call.    Follow-up: 4 weeks, call or present sooner PRN   No

## 2020-08-26 ENCOUNTER — TELEPHONE (OUTPATIENT)
Dept: ADMINISTRATIVE | Facility: OTHER | Age: 35
End: 2020-08-26

## 2020-08-26 ENCOUNTER — PROCEDURE VISIT (OUTPATIENT)
Dept: MATERNAL FETAL MEDICINE | Facility: CLINIC | Age: 35
End: 2020-08-26
Attending: STUDENT IN AN ORGANIZED HEALTH CARE EDUCATION/TRAINING PROGRAM
Payer: COMMERCIAL

## 2020-08-26 DIAGNOSIS — Z36.89 ENCOUNTER FOR ULTRASOUND TO ASSESS FETAL GROWTH: Primary | ICD-10-CM

## 2020-08-26 DIAGNOSIS — Z3A.12 12 WEEKS GESTATION OF PREGNANCY: ICD-10-CM

## 2020-08-26 DIAGNOSIS — O09.522 ADVANCED MATERNAL AGE IN MULTIGRAVIDA, SECOND TRIMESTER: ICD-10-CM

## 2020-08-26 PROCEDURE — 76811 PR US, OB FETAL EVAL & EXAM, TRANSABDOM,FIRST GESTATION: ICD-10-PCS | Mod: S$GLB,,, | Performed by: OBSTETRICS & GYNECOLOGY

## 2020-08-26 PROCEDURE — 76811 OB US DETAILED SNGL FETUS: CPT | Mod: S$GLB,,, | Performed by: OBSTETRICS & GYNECOLOGY

## 2020-08-27 ENCOUNTER — TELEPHONE (OUTPATIENT)
Dept: OBSTETRICS AND GYNECOLOGY | Facility: CLINIC | Age: 35
End: 2020-08-27

## 2020-08-27 NOTE — TELEPHONE ENCOUNTER
Spoke with patient.  She is feeling much better after she cut out dairy.  Will continue to monitor.  She will call if symptoms worsen or do not improve and will get in with GI at that point.  All questions answered.  Keep scheduled follow up.

## 2020-09-21 ENCOUNTER — PATIENT MESSAGE (OUTPATIENT)
Dept: OBSTETRICS AND GYNECOLOGY | Facility: CLINIC | Age: 35
End: 2020-09-21

## 2020-09-21 ENCOUNTER — ROUTINE PRENATAL (OUTPATIENT)
Dept: OBSTETRICS AND GYNECOLOGY | Facility: CLINIC | Age: 35
End: 2020-09-21
Payer: COMMERCIAL

## 2020-09-21 VITALS — DIASTOLIC BLOOD PRESSURE: 56 MMHG | SYSTOLIC BLOOD PRESSURE: 92 MMHG | WEIGHT: 134.69 LBS | BODY MASS INDEX: 23.12 KG/M2

## 2020-09-21 DIAGNOSIS — Z34.92 PREGNANT AND NOT YET DELIVERED IN SECOND TRIMESTER: Primary | ICD-10-CM

## 2020-09-21 PROCEDURE — 0502F PR SUBSEQUENT PRENATAL CARE: ICD-10-PCS | Mod: CPTII,S$GLB,, | Performed by: ADVANCED PRACTICE MIDWIFE

## 2020-09-21 PROCEDURE — 99999 PR PBB SHADOW E&M-EST. PATIENT-LVL II: ICD-10-PCS | Mod: PBBFAC,,, | Performed by: ADVANCED PRACTICE MIDWIFE

## 2020-09-21 PROCEDURE — 99999 PR PBB SHADOW E&M-EST. PATIENT-LVL II: CPT | Mod: PBBFAC,,, | Performed by: ADVANCED PRACTICE MIDWIFE

## 2020-09-21 PROCEDURE — 0502F SUBSEQUENT PRENATAL CARE: CPT | Mod: CPTII,S$GLB,, | Performed by: ADVANCED PRACTICE MIDWIFE

## 2020-09-21 NOTE — LETTER
2020      [facility] [address]  [address]  [facility] [address]  Phone: [phone]  Fax: [phone]       Patient: Loren Levin   YOB: 1985  Date of Visit: 2020    To Whom It May Concern:    Andres Levin is being cared for in the [facility] system. She has hired a  to assist her in childbirth. The  will assist her with education and wellness throughout the pregnancy, specifically at the time of labor and birth and will also be needed postpartum. This is a medical necessity as it decreases her risk of  section immensely and increases the likelihood she'll be successful at having an uncomplicated vaginal birth. A 's support has also been shown to have a beneficial impact on the birthing mother's state of mind and decrease the risk of postpartum depression. We support her decision to pursue this level of care. Her estimated date of birth is 21, so the services will not be completed until early March.    Sincerely,      Ariella Jean CNM

## 2020-09-21 NOTE — PROGRESS NOTES
Chief Complaint   Patient presents with    Routine Prenatal Visit       35 y.o. female  at 23w3d, by Estimated Date of Delivery: 1/15/21    Doing well today. Discussed helps for round ligament pain. Wrote letter of medical necessity for .  Discussed breastfeeding in detail - pt has appt to take breastfeeding class.  Pt getting flu shot at work.  Reviewed TW lbs    ROS  OBSTETRICS:   Contractions No   Bleeding No   Loss of fluid No   Fetal mvmnt +  GASTRO:   Nausea No   Vomiting No      OB History    Para Term  AB Living   2       1     SAB TAB Ectopic Multiple Live Births     1            # Outcome Date GA Lbr Rayo/2nd Weight Sex Delivery Anes PTL Lv   2 Current            1 TAB 07/12/10 6w0d             Obstetric Comments   Menarche age 12       Dating reviewed  Allergies and problem list reviewed and updated  Medical and surgical history reviewed  Prenatal labs reviewed and updated    PHYSICAL EXAM  BP (!) 92/56   Wt 61.1 kg (134 lb 11.2 oz)   LMP 04/10/2020 (Approximate)   BMI 23.12 kg/m²     GENERAL: No acute distress  HEENT: Normocephalic, atraumatic  NEURO: Alert and oriented x3  PSYCH: Normal mood and affect  PULMONARY: Non-labored respiration; no tachypnea  ABD: Soft, gravid, nontender      ASSESSMENT AND PLAN    pregnancy Problems (from 20 to present)     No problems associated with this episode.            Reviewed upcoming 28wk labs, (O POS) and orders placed  Education regarding warning signs of PreEclampsia, reviewed normal FM,  labor precautions, and how/when to call.    Follow-up: 4 weeks, call or present sooner PRN  Birth Center Risk Assessment: 0- Meets birth center guidelines    0- CNM management in ABC  1- CNM management on L&D  2- Consultation with OB to develop  plan of care  3- Collaborative CNM/OB management with delivery on L&D  Permanent referral of care to MD

## 2020-09-25 ENCOUNTER — TELEPHONE (OUTPATIENT)
Dept: MATERNAL FETAL MEDICINE | Facility: CLINIC | Age: 35
End: 2020-09-25

## 2020-10-12 ENCOUNTER — PATIENT MESSAGE (OUTPATIENT)
Dept: OBSTETRICS AND GYNECOLOGY | Facility: CLINIC | Age: 35
End: 2020-10-12

## 2020-10-14 ENCOUNTER — ROUTINE PRENATAL (OUTPATIENT)
Dept: OBSTETRICS AND GYNECOLOGY | Facility: CLINIC | Age: 35
End: 2020-10-14
Payer: COMMERCIAL

## 2020-10-14 ENCOUNTER — LAB VISIT (OUTPATIENT)
Dept: LAB | Facility: OTHER | Age: 35
End: 2020-10-14
Attending: ADVANCED PRACTICE MIDWIFE
Payer: COMMERCIAL

## 2020-10-14 VITALS
WEIGHT: 137.25 LBS | DIASTOLIC BLOOD PRESSURE: 64 MMHG | SYSTOLIC BLOOD PRESSURE: 106 MMHG | BODY MASS INDEX: 23.56 KG/M2

## 2020-10-14 DIAGNOSIS — O09.529 ANTEPARTUM MULTIGRAVIDA OF ADVANCED MATERNAL AGE: ICD-10-CM

## 2020-10-14 DIAGNOSIS — Z34.90 PREGNANCY WITH ONE FETUS, ANTEPARTUM: ICD-10-CM

## 2020-10-14 DIAGNOSIS — Z34.90 PREGNANCY, UNSPECIFIED GESTATIONAL AGE: Primary | ICD-10-CM

## 2020-10-14 DIAGNOSIS — Z13.9 RISK AND FUNCTIONAL ASSESSMENT: ICD-10-CM

## 2020-10-14 DIAGNOSIS — Z34.92 PREGNANT AND NOT YET DELIVERED IN SECOND TRIMESTER: ICD-10-CM

## 2020-10-14 LAB
BASOPHILS # BLD AUTO: 0.02 K/UL (ref 0–0.2)
BASOPHILS NFR BLD: 0.2 % (ref 0–1.9)
DIFFERENTIAL METHOD: ABNORMAL
EOSINOPHIL # BLD AUTO: 0.1 K/UL (ref 0–0.5)
EOSINOPHIL NFR BLD: 1.1 % (ref 0–8)
ERYTHROCYTE [DISTWIDTH] IN BLOOD BY AUTOMATED COUNT: 12.4 % (ref 11.5–14.5)
GLUCOSE SERPL-MCNC: 74 MG/DL (ref 70–140)
HCT VFR BLD AUTO: 36.5 % (ref 37–48.5)
HGB BLD-MCNC: 12.1 G/DL (ref 12–16)
IMM GRANULOCYTES # BLD AUTO: 0.04 K/UL (ref 0–0.04)
IMM GRANULOCYTES NFR BLD AUTO: 0.4 % (ref 0–0.5)
LYMPHOCYTES # BLD AUTO: 0.9 K/UL (ref 1–4.8)
LYMPHOCYTES NFR BLD: 9.3 % (ref 18–48)
MCH RBC QN AUTO: 31.8 PG (ref 27–31)
MCHC RBC AUTO-ENTMCNC: 33.2 G/DL (ref 32–36)
MCV RBC AUTO: 96 FL (ref 82–98)
MONOCYTES # BLD AUTO: 0.5 K/UL (ref 0.3–1)
MONOCYTES NFR BLD: 4.7 % (ref 4–15)
NEUTROPHILS # BLD AUTO: 8.2 K/UL (ref 1.8–7.7)
NEUTROPHILS NFR BLD: 84.3 % (ref 38–73)
NRBC BLD-RTO: 0 /100 WBC
PLATELET # BLD AUTO: 164 K/UL (ref 150–350)
PMV BLD AUTO: 12.1 FL (ref 9.2–12.9)
RBC # BLD AUTO: 3.81 M/UL (ref 4–5.4)
WBC # BLD AUTO: 9.77 K/UL (ref 3.9–12.7)

## 2020-10-14 PROCEDURE — 36415 COLL VENOUS BLD VENIPUNCTURE: CPT

## 2020-10-14 PROCEDURE — 0502F PR SUBSEQUENT PRENATAL CARE: ICD-10-PCS | Mod: CPTII,S$GLB,, | Performed by: ADVANCED PRACTICE MIDWIFE

## 2020-10-14 PROCEDURE — 0502F SUBSEQUENT PRENATAL CARE: CPT | Mod: CPTII,S$GLB,, | Performed by: ADVANCED PRACTICE MIDWIFE

## 2020-10-14 PROCEDURE — 99999 PR PBB SHADOW E&M-EST. PATIENT-LVL III: ICD-10-PCS | Mod: PBBFAC,,, | Performed by: ADVANCED PRACTICE MIDWIFE

## 2020-10-14 PROCEDURE — 99999 PR PBB SHADOW E&M-EST. PATIENT-LVL III: CPT | Mod: PBBFAC,,, | Performed by: ADVANCED PRACTICE MIDWIFE

## 2020-10-14 PROCEDURE — 85025 COMPLETE CBC W/AUTO DIFF WBC: CPT

## 2020-10-14 PROCEDURE — 82950 GLUCOSE TEST: CPT

## 2020-10-14 NOTE — PROGRESS NOTES
Chief Complaint   Patient presents with    Routine Prenatal Visit     35 y.o. female  at 26w3d by 8wk US    Doing well.    Meds: PNV    Reviewed TW lbs    BMI  -- Discussed IOM recommended weight gain of:   Normal Weight 18.5-24.9  25-35   -- Discussed criteria for delivery at [facility] r/t excessive pre-preg weight or excessive weight gain:   Pre-pregnancy BMI over 40 or excess pregnancy weight gain defined as:   Pre-preg BMI 18.5-24.9;  Excess weight gain = > 53 pounds    ROS  OBSTETRICS:   Contractions No   Bleeding No   Loss of fluid No   Fetal mvmnt Yes  GASTRO:   Nausea No   Vomiting No      OB History    Para Term  AB Living   2       1     SAB TAB Ectopic Multiple Live Births     1            # Outcome Date GA Lbr Rayo/2nd Weight Sex Delivery Anes PTL Lv   2 Current            1 TAB 07/12/10 6w0d             Obstetric Comments   Menarche age 12       Dating reviewed  Allergies and problem list reviewed and updated  Medical and surgical history reviewed  Prenatal labs reviewed and updated    PHYSICAL EXAM  /64   Wt 62.2 kg (137 lb 3.8 oz)   LMP 04/10/2020 (Approximate)   BMI 23.56 kg/m²     GENERAL: No acute distress  HEENT: Normocephalic, atraumatic  NEURO: Alert and oriented x3  PSYCH: Normal mood and affect  PULMONARY: Non-labored respiration; no tachypnea  ABD: Soft, gravid, nontender.    ASSESSMENT AND PLAN    pregnancy Problems (from 20 to present)     No problems associated with this episode.        Completing 28wk labs today.     Blood type: (O POS)    Education regarding CDC recommendations for Tdap in pregnancy. Patient desires to receive Tdap. Order placed, will schedule with next appt.  Got the flu shot already this season.    Reviewed warning signs, normal FKCs,  labor precautions and how/when to call.    Follow-up: 2 weeks, call or present sooner PRN.

## 2020-10-26 ENCOUNTER — PATIENT MESSAGE (OUTPATIENT)
Dept: OBSTETRICS AND GYNECOLOGY | Facility: CLINIC | Age: 35
End: 2020-10-26

## 2020-10-28 ENCOUNTER — ROUTINE PRENATAL (OUTPATIENT)
Dept: OBSTETRICS AND GYNECOLOGY | Facility: CLINIC | Age: 35
End: 2020-10-28
Payer: COMMERCIAL

## 2020-10-28 ENCOUNTER — CLINICAL SUPPORT (OUTPATIENT)
Dept: OBSTETRICS AND GYNECOLOGY | Facility: CLINIC | Age: 35
End: 2020-10-28
Payer: COMMERCIAL

## 2020-10-28 VITALS
BODY MASS INDEX: 23.46 KG/M2 | DIASTOLIC BLOOD PRESSURE: 72 MMHG | SYSTOLIC BLOOD PRESSURE: 110 MMHG | WEIGHT: 136.69 LBS

## 2020-10-28 DIAGNOSIS — Z23 NEED FOR DIPHTHERIA-TETANUS-PERTUSSIS (TDAP) VACCINE: Primary | ICD-10-CM

## 2020-10-28 DIAGNOSIS — Z34.93 PRENATAL CARE IN THIRD TRIMESTER: Primary | ICD-10-CM

## 2020-10-28 PROCEDURE — 90715 TDAP VACCINE GREATER THAN OR EQUAL TO 7YO IM: ICD-10-PCS | Mod: S$GLB,,, | Performed by: ADVANCED PRACTICE MIDWIFE

## 2020-10-28 PROCEDURE — 90471 IMMUNIZATION ADMIN: CPT | Mod: S$GLB,,, | Performed by: ADVANCED PRACTICE MIDWIFE

## 2020-10-28 PROCEDURE — 99999 PR PBB SHADOW E&M-EST. PATIENT-LVL III: ICD-10-PCS | Mod: PBBFAC,,, | Performed by: ADVANCED PRACTICE MIDWIFE

## 2020-10-28 PROCEDURE — 0502F SUBSEQUENT PRENATAL CARE: CPT | Mod: CPTII,S$GLB,, | Performed by: ADVANCED PRACTICE MIDWIFE

## 2020-10-28 PROCEDURE — 90715 TDAP VACCINE 7 YRS/> IM: CPT | Mod: S$GLB,,, | Performed by: ADVANCED PRACTICE MIDWIFE

## 2020-10-28 PROCEDURE — 0502F PR SUBSEQUENT PRENATAL CARE: ICD-10-PCS | Mod: CPTII,S$GLB,, | Performed by: ADVANCED PRACTICE MIDWIFE

## 2020-10-28 PROCEDURE — 99999 PR PBB SHADOW E&M-EST. PATIENT-LVL I: ICD-10-PCS | Mod: PBBFAC,,,

## 2020-10-28 PROCEDURE — 99999 PR PBB SHADOW E&M-EST. PATIENT-LVL I: CPT | Mod: PBBFAC,,,

## 2020-10-28 PROCEDURE — 99999 PR PBB SHADOW E&M-EST. PATIENT-LVL III: CPT | Mod: PBBFAC,,, | Performed by: ADVANCED PRACTICE MIDWIFE

## 2020-10-28 PROCEDURE — 90471 TDAP VACCINE GREATER THAN OR EQUAL TO 7YO IM: ICD-10-PCS | Mod: S$GLB,,, | Performed by: ADVANCED PRACTICE MIDWIFE

## 2020-10-28 RX ORDER — VALACYCLOVIR HYDROCHLORIDE 1 G/1
TABLET, FILM COATED ORAL
Status: ON HOLD | COMMUNITY
Start: 2020-10-18 | End: 2021-01-29 | Stop reason: HOSPADM

## 2020-10-28 NOTE — PROGRESS NOTES
Here for TDAP injection. Patient without complaint of pain at this time, Injection given. Tolerated well. No pain noted after injection.  Advised to wait in lobby 15 minutes and report any adverse reactions.     Site: [facility]    Baby Friendly Handout Given to Patient

## 2020-10-28 NOTE — PROGRESS NOTES
Chief Complaint   Patient presents with    Routine Prenatal Visit       35 y.o. female  at 28w3d, by Estimated Date of Delivery: 21    Complaints today: none. Doing well today.  Reviewed TWG: normal lbs    ROS  OBSTETRICS:   Contractions No   Bleeding No   Loss of fluid No   Fetal mvmnt present  GASTRO:   Nausea No   Vomiting No      OB History    Para Term  AB Living   2       1     SAB TAB Ectopic Multiple Live Births     1            # Outcome Date GA Lbr Rayo/2nd Weight Sex Delivery Anes PTL Lv   2 Current            1 TAB 07/12/10 6w0d             Obstetric Comments   Menarche age 12       Dating reviewed  Allergies and problem list reviewed and updated  Medical and surgical history reviewed  Prenatal labs reviewed and updated    PHYSICAL EXAM  /72   Wt 62 kg (136 lb 11 oz)   LMP 04/10/2020 (Approximate)   BMI 23.46 kg/m²     GENERAL: No acute distress  HEENT: Normocephalic, atraumatic  NEURO: Alert and oriented x3  PSYCH: Normal mood and affect  PULMONARY: Non-labored respiration; no tachypnea  ABD: Soft, gravid, nontender.      ASSESSMENT AND PLAN    pregnancy Problems (from 20 to present)     Problem Noted Resolved    Antepartum multigravida of advanced maternal age 10/14/2020 by Nanda Cota CNM No    Overview Signed 10/14/2020  2:04 PM by Nanda Cota CNM     ( )32 wk          Birth Center Risk Assessment: 0- Meets birth center guidelines 10/14/2020 by Nanda Cota CNM No    Overview Signed 10/14/2020  2:06 PM by Nanda Cota CNM     Birth Center Risk Assessment: 0- Meets birth center guidelines    0- CNM management in ABC  1- CNM management on L&D  2- Consultation with OB to develop  plan of care  3- Collaborative CNM/OB management with delivery on L&D  4- Permanent referral of care to MD                 Reviewed 28wk labs  Received/Declines TDAP today   Is  taking Childbirth Education classes.  Education regarding options  for postpartum contraception, she desires (was unhappy with IUD had lots of cramping and heavier)--would consider pop and/or hormonal IUD (will consider)  Malika: Sis Dumas  Reviewed warning signs, normal FM,  labor precautions, and how/when to call.    Birth Center Risk Assessment: 0- Meets birth center guidelines    0- CNM management in ABC  1- CNM management on L&D  2- Consultation with OB to develop  plan of care  3- Collaborative CNM/OB management with delivery on L&D  4- Permanent referral of care to MD    Questions about ABC: curious about cervical exams, can  help deliver the baby, TERESO questions--support people.  Follow-up: 2 weeks, call or present sooner JUN Fraser CNM, MSN  10/28/2020  10:21 AM

## 2020-11-06 ENCOUNTER — PATIENT MESSAGE (OUTPATIENT)
Dept: OBSTETRICS AND GYNECOLOGY | Facility: CLINIC | Age: 35
End: 2020-11-06

## 2020-11-12 ENCOUNTER — PATIENT MESSAGE (OUTPATIENT)
Dept: PEDIATRICS | Facility: CLINIC | Age: 35
End: 2020-11-12

## 2020-11-12 ENCOUNTER — ROUTINE PRENATAL (OUTPATIENT)
Dept: OBSTETRICS AND GYNECOLOGY | Facility: CLINIC | Age: 35
End: 2020-11-12
Payer: COMMERCIAL

## 2020-11-12 VITALS — DIASTOLIC BLOOD PRESSURE: 78 MMHG | WEIGHT: 143.31 LBS | BODY MASS INDEX: 24.6 KG/M2 | SYSTOLIC BLOOD PRESSURE: 110 MMHG

## 2020-11-12 DIAGNOSIS — Z34.93 PRENATAL CARE IN THIRD TRIMESTER: Primary | ICD-10-CM

## 2020-11-12 PROCEDURE — 0502F PR SUBSEQUENT PRENATAL CARE: ICD-10-PCS | Mod: CPTII,S$GLB,, | Performed by: ADVANCED PRACTICE MIDWIFE

## 2020-11-12 PROCEDURE — 99999 PR PBB SHADOW E&M-EST. PATIENT-LVL II: CPT | Mod: PBBFAC,,, | Performed by: ADVANCED PRACTICE MIDWIFE

## 2020-11-12 PROCEDURE — 99999 PR PBB SHADOW E&M-EST. PATIENT-LVL II: ICD-10-PCS | Mod: PBBFAC,,, | Performed by: ADVANCED PRACTICE MIDWIFE

## 2020-11-12 PROCEDURE — 0502F SUBSEQUENT PRENATAL CARE: CPT | Mod: CPTII,S$GLB,, | Performed by: ADVANCED PRACTICE MIDWIFE

## 2020-11-12 NOTE — PROGRESS NOTES
No chief complaint on file.      35 y.o. female  at 30w4d, by Estimated Date of Delivery: 21, Growth sonogram     Complaints today: none. Doing well today.  Reviewed TW lbs    ROS  OBSTETRICS:   Contractions No   Bleeding No   Loss of fluid No   Fetal mvmnt present  GASTRO:   Nausea No   Vomiting No      OB History    Para Term  AB Living   2       1     SAB TAB Ectopic Multiple Live Births     1            # Outcome Date GA Lbr Rayo/2nd Weight Sex Delivery Anes PTL Lv   2 Current            1 TAB 07/12/10 6w0d             Obstetric Comments   Menarche age 12       Dating reviewed  Allergies and problem list reviewed and updated  Medical and surgical history reviewed  Prenatal labs reviewed and updated    PHYSICAL EXAM  /78   Wt 65 kg (143 lb 4.8 oz)   LMP 04/10/2020 (Approximate)   BMI 24.60 kg/m²     GENERAL: No acute distress  HEENT: Normocephalic, atraumatic  NEURO: Alert and oriented x3  PSYCH: Normal mood and affect  PULMONARY: Non-labored respiration; no tachypnea  ABD: Soft, gravid, nontender.      ASSESSMENT AND PLAN    pregnancy Problems (from 20 to present)     Problem Noted Resolved    Antepartum multigravida of advanced maternal age 10/14/2020 by Nanda Cota CNM No    Overview Signed 10/14/2020  2:04 PM by Nanda Cota CNM     ( )32 wk          Birth Center Risk Assessment: 0- Meets birth center guidelines 10/14/2020 by Nanda Cota CNM No    Overview Signed 10/14/2020  2:06 PM by Nanda Cota CNM     Birth Center Risk Assessment: 0- Meets birth center guidelines    0- CNM management in ABC  1- CNM management on L&D  2- Consultation with OB to develop  plan of care  3- Collaborative CNM/OB management with delivery on L&D  4- Permanent referral of care to MD                 Reviewed 28wk labs  Already had TDAP today   Is taking Childbirth Education classes via zoom with her pregnant friend  Education regarding  options for postpartum contraception, she desires unsure--discussed options with patient w/ regard to safe options with breastfeeding (will consider)    Reviewed warning signs, normal FM,  labor precautions, and how/when to call.    Birth Center Risk Assessment: 0- Meets birth center guidelines    0- CNM management in ABC  1- CNM management on L&D  2- Consultation with OB to develop  plan of care  3- Collaborative CNM/OB management with delivery on L&D  4- Permanent referral of care to MD      Follow-up: 2 weeks, call or present sooner PRN

## 2020-11-13 ENCOUNTER — PATIENT MESSAGE (OUTPATIENT)
Dept: OBSTETRICS AND GYNECOLOGY | Facility: CLINIC | Age: 35
End: 2020-11-13

## 2020-11-25 ENCOUNTER — ROUTINE PRENATAL (OUTPATIENT)
Dept: OBSTETRICS AND GYNECOLOGY | Facility: CLINIC | Age: 35
End: 2020-11-25
Payer: COMMERCIAL

## 2020-11-25 VITALS
DIASTOLIC BLOOD PRESSURE: 72 MMHG | WEIGHT: 142.44 LBS | BODY MASS INDEX: 24.45 KG/M2 | SYSTOLIC BLOOD PRESSURE: 108 MMHG

## 2020-11-25 DIAGNOSIS — Z34.93 PRENATAL CARE IN THIRD TRIMESTER: Primary | ICD-10-CM

## 2020-11-25 PROCEDURE — 99999 PR PBB SHADOW E&M-EST. PATIENT-LVL II: CPT | Mod: PBBFAC,,, | Performed by: ADVANCED PRACTICE MIDWIFE

## 2020-11-25 PROCEDURE — 99999 PR PBB SHADOW E&M-EST. PATIENT-LVL II: ICD-10-PCS | Mod: PBBFAC,,, | Performed by: ADVANCED PRACTICE MIDWIFE

## 2020-11-25 PROCEDURE — 0502F PR SUBSEQUENT PRENATAL CARE: ICD-10-PCS | Mod: CPTII,S$GLB,, | Performed by: ADVANCED PRACTICE MIDWIFE

## 2020-11-25 PROCEDURE — 0502F SUBSEQUENT PRENATAL CARE: CPT | Mod: CPTII,S$GLB,, | Performed by: ADVANCED PRACTICE MIDWIFE

## 2020-12-01 ENCOUNTER — PROCEDURE VISIT (OUTPATIENT)
Dept: MATERNAL FETAL MEDICINE | Facility: CLINIC | Age: 35
End: 2020-12-01
Attending: STUDENT IN AN ORGANIZED HEALTH CARE EDUCATION/TRAINING PROGRAM
Payer: COMMERCIAL

## 2020-12-01 DIAGNOSIS — Z36.89 ENCOUNTER FOR ULTRASOUND TO ASSESS FETAL GROWTH: ICD-10-CM

## 2020-12-01 DIAGNOSIS — O09.529 ANTEPARTUM MULTIGRAVIDA OF ADVANCED MATERNAL AGE: ICD-10-CM

## 2020-12-01 PROCEDURE — 76816 OB US FOLLOW-UP PER FETUS: CPT | Mod: S$GLB,,, | Performed by: OBSTETRICS & GYNECOLOGY

## 2020-12-01 PROCEDURE — 76816 PR  US,PREGNANT UTERUS,F/U,TRANSABD APP: ICD-10-PCS | Mod: S$GLB,,, | Performed by: OBSTETRICS & GYNECOLOGY

## 2020-12-03 ENCOUNTER — PATIENT MESSAGE (OUTPATIENT)
Dept: OBSTETRICS AND GYNECOLOGY | Facility: CLINIC | Age: 35
End: 2020-12-03

## 2020-12-10 ENCOUNTER — ROUTINE PRENATAL (OUTPATIENT)
Dept: OBSTETRICS AND GYNECOLOGY | Facility: CLINIC | Age: 35
End: 2020-12-10
Payer: COMMERCIAL

## 2020-12-10 ENCOUNTER — PATIENT MESSAGE (OUTPATIENT)
Dept: OBSTETRICS AND GYNECOLOGY | Facility: CLINIC | Age: 35
End: 2020-12-10

## 2020-12-10 VITALS
SYSTOLIC BLOOD PRESSURE: 100 MMHG | DIASTOLIC BLOOD PRESSURE: 68 MMHG | WEIGHT: 144.38 LBS | BODY MASS INDEX: 24.79 KG/M2

## 2020-12-10 DIAGNOSIS — Z34.93 PREGNANT AND NOT YET DELIVERED IN THIRD TRIMESTER: Primary | ICD-10-CM

## 2020-12-10 PROCEDURE — 99999 PR PBB SHADOW E&M-EST. PATIENT-LVL II: CPT | Mod: PBBFAC,,, | Performed by: ADVANCED PRACTICE MIDWIFE

## 2020-12-10 PROCEDURE — 0502F SUBSEQUENT PRENATAL CARE: CPT | Mod: CPTII,S$GLB,, | Performed by: ADVANCED PRACTICE MIDWIFE

## 2020-12-10 PROCEDURE — 99999 PR PBB SHADOW E&M-EST. PATIENT-LVL II: ICD-10-PCS | Mod: PBBFAC,,, | Performed by: ADVANCED PRACTICE MIDWIFE

## 2020-12-10 PROCEDURE — 0502F PR SUBSEQUENT PRENATAL CARE: ICD-10-PCS | Mod: CPTII,S$GLB,, | Performed by: ADVANCED PRACTICE MIDWIFE

## 2020-12-10 NOTE — PROGRESS NOTES
No chief complaint on file.      35 y.o. female  at 34w4d, by Estimated Date of Delivery: 21    Doing well today.  Reviewed TW lbs    ROS  OBSTETRICS:   Contractions No   Bleeding No   Loss of fluid No   Fetal mvmnt +  GASTRO:   Nausea No   Vomiting No      OB History    Para Term  AB Living   2       1     SAB TAB Ectopic Multiple Live Births     1            # Outcome Date GA Lbr Rayo/2nd Weight Sex Delivery Anes PTL Lv   2 Current            1 TAB 07/12/10 6w0d             Obstetric Comments   Menarche age 12       Dating reviewed  Allergies and problem list reviewed and updated  Medical and surgical history reviewed  Prenatal labs reviewed and updated    PHYSICAL EXAM  /68   Wt 65.5 kg (144 lb 6.4 oz)   LMP 04/10/2020 (Approximate)   BMI 24.79 kg/m²     GENERAL: No acute distress  HEENT: Normocephalic, atraumatic  NEURO: Alert and oriented x3  PSYCH: Normal mood and affect  PULMONARY: Non-labored respiration; no tachypnea  ABD: Soft, gravid, nontender.      ASSESSMENT AND PLAN    pregnancy Problems (from 20 to present)     Problem Noted Resolved    Antepartum multigravida of advanced maternal age 10/14/2020 by Nanda Cota CNM No    Overview Signed 10/14/2020  2:04 PM by Nanda Cota CNM     ( )32 wk          Birth Center Risk Assessment: 0- Meets birth center guidelines 10/14/2020 by Nanda Cota CNM No    Overview Signed 10/14/2020  2:06 PM by Nanda Cota CNM     Birth Center Risk Assessment: 0- Meets birth center guidelines    0- CNM management in ABC  1- CNM management on L&D  2- Consultation with OB to develop  plan of care  3- Collaborative CNM/OB management with delivery on L&D  4- Permanent referral of care to MD                 Reviewed upcoming 36wk labs.   Reviewed patient does NOT have a history of genital HSV.     Reviewed ABC risk assessment with the patient:    Birth Center Risk Assessment: 0- Meets birth center  guidelines    0- CNM management in ABC  1- CNM management on L&D  2- Consultation with OB to develop  plan of care  3- Collaborative CNM/OB management with delivery on L&D  4- Permanent referral of care to MD    She understands she is a candidate for the ABC. Reviewed potential risks which could arise, that could change this status.    Reviewed warning signs, normal FM,  labor precautions, and how/when to call.  Confirmed pt has after-hours number.    Follow-up: 2 weeks, call or present sooner PRN

## 2020-12-23 ENCOUNTER — LAB VISIT (OUTPATIENT)
Dept: LAB | Facility: OTHER | Age: 35
End: 2020-12-23
Attending: ADVANCED PRACTICE MIDWIFE
Payer: COMMERCIAL

## 2020-12-23 ENCOUNTER — ROUTINE PRENATAL (OUTPATIENT)
Dept: OBSTETRICS AND GYNECOLOGY | Facility: CLINIC | Age: 35
End: 2020-12-23
Payer: COMMERCIAL

## 2020-12-23 VITALS
SYSTOLIC BLOOD PRESSURE: 112 MMHG | DIASTOLIC BLOOD PRESSURE: 78 MMHG | WEIGHT: 147.63 LBS | BODY MASS INDEX: 25.34 KG/M2

## 2020-12-23 DIAGNOSIS — Z34.93 PRENATAL CARE IN THIRD TRIMESTER: ICD-10-CM

## 2020-12-23 DIAGNOSIS — Z34.93 PRENATAL CARE IN THIRD TRIMESTER: Primary | ICD-10-CM

## 2020-12-23 LAB
BASOPHILS # BLD AUTO: 0.02 K/UL (ref 0–0.2)
BASOPHILS NFR BLD: 0.2 % (ref 0–1.9)
DIFFERENTIAL METHOD: ABNORMAL
EOSINOPHIL # BLD AUTO: 0.1 K/UL (ref 0–0.5)
EOSINOPHIL NFR BLD: 0.6 % (ref 0–8)
ERYTHROCYTE [DISTWIDTH] IN BLOOD BY AUTOMATED COUNT: 12.2 % (ref 11.5–14.5)
HCT VFR BLD AUTO: 37.2 % (ref 37–48.5)
HGB BLD-MCNC: 12.6 G/DL (ref 12–16)
IMM GRANULOCYTES # BLD AUTO: 0.09 K/UL (ref 0–0.04)
IMM GRANULOCYTES NFR BLD AUTO: 0.9 % (ref 0–0.5)
LYMPHOCYTES # BLD AUTO: 0.9 K/UL (ref 1–4.8)
LYMPHOCYTES NFR BLD: 9.6 % (ref 18–48)
MCH RBC QN AUTO: 31.5 PG (ref 27–31)
MCHC RBC AUTO-ENTMCNC: 33.9 G/DL (ref 32–36)
MCV RBC AUTO: 93 FL (ref 82–98)
MONOCYTES # BLD AUTO: 0.7 K/UL (ref 0.3–1)
MONOCYTES NFR BLD: 6.8 % (ref 4–15)
NEUTROPHILS # BLD AUTO: 7.8 K/UL (ref 1.8–7.7)
NEUTROPHILS NFR BLD: 81.9 % (ref 38–73)
NRBC BLD-RTO: 0 /100 WBC
PLATELET # BLD AUTO: 134 K/UL (ref 150–350)
PMV BLD AUTO: 11.6 FL (ref 9.2–12.9)
RBC # BLD AUTO: 4 M/UL (ref 4–5.4)
WBC # BLD AUTO: 9.57 K/UL (ref 3.9–12.7)

## 2020-12-23 PROCEDURE — 99999 PR PBB SHADOW E&M-EST. PATIENT-LVL II: CPT | Mod: PBBFAC,,, | Performed by: ADVANCED PRACTICE MIDWIFE

## 2020-12-23 PROCEDURE — 86703 HIV-1/HIV-2 1 RESULT ANTBDY: CPT

## 2020-12-23 PROCEDURE — 0502F PR SUBSEQUENT PRENATAL CARE: ICD-10-PCS | Mod: CPTII,S$GLB,, | Performed by: ADVANCED PRACTICE MIDWIFE

## 2020-12-23 PROCEDURE — 0502F SUBSEQUENT PRENATAL CARE: CPT | Mod: CPTII,S$GLB,, | Performed by: ADVANCED PRACTICE MIDWIFE

## 2020-12-23 PROCEDURE — 99999 PR PBB SHADOW E&M-EST. PATIENT-LVL II: ICD-10-PCS | Mod: PBBFAC,,, | Performed by: ADVANCED PRACTICE MIDWIFE

## 2020-12-23 PROCEDURE — 86592 SYPHILIS TEST NON-TREP QUAL: CPT

## 2020-12-23 PROCEDURE — 85025 COMPLETE CBC W/AUTO DIFF WBC: CPT

## 2020-12-23 PROCEDURE — 36415 COLL VENOUS BLD VENIPUNCTURE: CPT

## 2020-12-23 PROCEDURE — 87081 CULTURE SCREEN ONLY: CPT

## 2020-12-24 LAB
HIV 1+2 AB+HIV1 P24 AG SERPL QL IA: NEGATIVE
RPR SER QL: NORMAL

## 2020-12-27 LAB — BACTERIA SPEC AEROBE CULT: NORMAL

## 2020-12-31 ENCOUNTER — ROUTINE PRENATAL (OUTPATIENT)
Dept: OBSTETRICS AND GYNECOLOGY | Facility: CLINIC | Age: 35
End: 2020-12-31
Payer: COMMERCIAL

## 2020-12-31 VITALS
DIASTOLIC BLOOD PRESSURE: 74 MMHG | WEIGHT: 145.94 LBS | BODY MASS INDEX: 25.05 KG/M2 | SYSTOLIC BLOOD PRESSURE: 100 MMHG

## 2020-12-31 DIAGNOSIS — O09.529 ANTEPARTUM MULTIGRAVIDA OF ADVANCED MATERNAL AGE: ICD-10-CM

## 2020-12-31 DIAGNOSIS — Z13.9 RISK AND FUNCTIONAL ASSESSMENT: ICD-10-CM

## 2020-12-31 DIAGNOSIS — O99.113 GESTATIONAL THROMBOCYTOPENIA WITHOUT HEMORRHAGE IN THIRD TRIMESTER: ICD-10-CM

## 2020-12-31 DIAGNOSIS — D69.6 GESTATIONAL THROMBOCYTOPENIA WITHOUT HEMORRHAGE IN THIRD TRIMESTER: ICD-10-CM

## 2020-12-31 DIAGNOSIS — Z34.90 PREGNANCY WITH ONE FETUS, ANTEPARTUM: Primary | ICD-10-CM

## 2020-12-31 PROCEDURE — 99999 PR PBB SHADOW E&M-EST. PATIENT-LVL II: ICD-10-PCS | Mod: PBBFAC,,, | Performed by: ADVANCED PRACTICE MIDWIFE

## 2020-12-31 PROCEDURE — 0502F PR SUBSEQUENT PRENATAL CARE: ICD-10-PCS | Mod: CPTII,S$GLB,, | Performed by: ADVANCED PRACTICE MIDWIFE

## 2020-12-31 PROCEDURE — 0502F SUBSEQUENT PRENATAL CARE: CPT | Mod: CPTII,S$GLB,, | Performed by: ADVANCED PRACTICE MIDWIFE

## 2020-12-31 PROCEDURE — 99999 PR PBB SHADOW E&M-EST. PATIENT-LVL II: CPT | Mod: PBBFAC,,, | Performed by: ADVANCED PRACTICE MIDWIFE

## 2021-01-02 PROBLEM — D69.6 GESTATIONAL THROMBOCYTOPENIA WITHOUT HEMORRHAGE IN THIRD TRIMESTER: Status: ACTIVE | Noted: 2021-01-02

## 2021-01-02 PROBLEM — O99.113 GESTATIONAL THROMBOCYTOPENIA WITHOUT HEMORRHAGE IN THIRD TRIMESTER: Status: ACTIVE | Noted: 2021-01-02

## 2021-01-04 ENCOUNTER — PATIENT MESSAGE (OUTPATIENT)
Dept: OBSTETRICS AND GYNECOLOGY | Facility: CLINIC | Age: 36
End: 2021-01-04

## 2021-01-05 DIAGNOSIS — Z34.90 PREGNANCY WITH ONE FETUS, ANTEPARTUM: Primary | ICD-10-CM

## 2021-01-07 ENCOUNTER — LAB VISIT (OUTPATIENT)
Dept: LAB | Facility: OTHER | Age: 36
End: 2021-01-07
Payer: COMMERCIAL

## 2021-01-07 ENCOUNTER — ROUTINE PRENATAL (OUTPATIENT)
Dept: OBSTETRICS AND GYNECOLOGY | Facility: CLINIC | Age: 36
End: 2021-01-07
Payer: COMMERCIAL

## 2021-01-07 VITALS
DIASTOLIC BLOOD PRESSURE: 72 MMHG | BODY MASS INDEX: 25.54 KG/M2 | WEIGHT: 148.81 LBS | SYSTOLIC BLOOD PRESSURE: 110 MMHG

## 2021-01-07 DIAGNOSIS — Z34.90 PREGNANCY WITH ONE FETUS, ANTEPARTUM: Primary | ICD-10-CM

## 2021-01-07 DIAGNOSIS — Z34.90 PREGNANCY WITH ONE FETUS, ANTEPARTUM: ICD-10-CM

## 2021-01-07 LAB
BASOPHILS # BLD AUTO: 0.02 K/UL (ref 0–0.2)
BASOPHILS NFR BLD: 0.2 % (ref 0–1.9)
DIFFERENTIAL METHOD: ABNORMAL
EOSINOPHIL # BLD AUTO: 0.1 K/UL (ref 0–0.5)
EOSINOPHIL NFR BLD: 0.7 % (ref 0–8)
ERYTHROCYTE [DISTWIDTH] IN BLOOD BY AUTOMATED COUNT: 12.5 % (ref 11.5–14.5)
HCT VFR BLD AUTO: 38.9 % (ref 37–48.5)
HGB BLD-MCNC: 13.2 G/DL (ref 12–16)
IMM GRANULOCYTES # BLD AUTO: 0.04 K/UL (ref 0–0.04)
IMM GRANULOCYTES NFR BLD AUTO: 0.4 % (ref 0–0.5)
LYMPHOCYTES # BLD AUTO: 1.1 K/UL (ref 1–4.8)
LYMPHOCYTES NFR BLD: 11.9 % (ref 18–48)
MCH RBC QN AUTO: 31.6 PG (ref 27–31)
MCHC RBC AUTO-ENTMCNC: 33.9 G/DL (ref 32–36)
MCV RBC AUTO: 93 FL (ref 82–98)
MONOCYTES # BLD AUTO: 0.7 K/UL (ref 0.3–1)
MONOCYTES NFR BLD: 7.4 % (ref 4–15)
NEUTROPHILS # BLD AUTO: 7.2 K/UL (ref 1.8–7.7)
NEUTROPHILS NFR BLD: 79.4 % (ref 38–73)
NRBC BLD-RTO: 0 /100 WBC
PLATELET # BLD AUTO: 137 K/UL (ref 150–350)
PMV BLD AUTO: 12.3 FL (ref 9.2–12.9)
RBC # BLD AUTO: 4.18 M/UL (ref 4–5.4)
WBC # BLD AUTO: 9.11 K/UL (ref 3.9–12.7)

## 2021-01-07 PROCEDURE — 99999 PR PBB SHADOW E&M-EST. PATIENT-LVL II: CPT | Mod: PBBFAC,,, | Performed by: ADVANCED PRACTICE MIDWIFE

## 2021-01-07 PROCEDURE — 0502F PR SUBSEQUENT PRENATAL CARE: ICD-10-PCS | Mod: CPTII,S$GLB,, | Performed by: ADVANCED PRACTICE MIDWIFE

## 2021-01-07 PROCEDURE — 36415 COLL VENOUS BLD VENIPUNCTURE: CPT

## 2021-01-07 PROCEDURE — 0502F SUBSEQUENT PRENATAL CARE: CPT | Mod: CPTII,S$GLB,, | Performed by: ADVANCED PRACTICE MIDWIFE

## 2021-01-07 PROCEDURE — 85025 COMPLETE CBC W/AUTO DIFF WBC: CPT

## 2021-01-07 PROCEDURE — 99999 PR PBB SHADOW E&M-EST. PATIENT-LVL II: ICD-10-PCS | Mod: PBBFAC,,, | Performed by: ADVANCED PRACTICE MIDWIFE

## 2021-01-08 ENCOUNTER — PATIENT MESSAGE (OUTPATIENT)
Dept: OBSTETRICS AND GYNECOLOGY | Facility: CLINIC | Age: 36
End: 2021-01-08

## 2021-01-13 ENCOUNTER — ROUTINE PRENATAL (OUTPATIENT)
Dept: OBSTETRICS AND GYNECOLOGY | Facility: CLINIC | Age: 36
End: 2021-01-13
Payer: COMMERCIAL

## 2021-01-13 VITALS
WEIGHT: 151.56 LBS | SYSTOLIC BLOOD PRESSURE: 120 MMHG | BODY MASS INDEX: 26.02 KG/M2 | DIASTOLIC BLOOD PRESSURE: 72 MMHG

## 2021-01-13 DIAGNOSIS — Z34.93 PREGNANT AND NOT YET DELIVERED IN THIRD TRIMESTER: Primary | ICD-10-CM

## 2021-01-13 PROCEDURE — 99999 PR PBB SHADOW E&M-EST. PATIENT-LVL II: ICD-10-PCS | Mod: PBBFAC,,, | Performed by: ADVANCED PRACTICE MIDWIFE

## 2021-01-13 PROCEDURE — 99999 PR PBB SHADOW E&M-EST. PATIENT-LVL II: CPT | Mod: PBBFAC,,, | Performed by: ADVANCED PRACTICE MIDWIFE

## 2021-01-13 PROCEDURE — 0502F SUBSEQUENT PRENATAL CARE: CPT | Mod: CPTII,S$GLB,, | Performed by: ADVANCED PRACTICE MIDWIFE

## 2021-01-13 PROCEDURE — 0502F PR SUBSEQUENT PRENATAL CARE: ICD-10-PCS | Mod: CPTII,S$GLB,, | Performed by: ADVANCED PRACTICE MIDWIFE

## 2021-01-18 ENCOUNTER — PATIENT MESSAGE (OUTPATIENT)
Dept: OBSTETRICS AND GYNECOLOGY | Facility: CLINIC | Age: 36
End: 2021-01-18

## 2021-01-18 PROBLEM — Z13.9 RISK AND FUNCTIONAL ASSESSMENT: Status: RESOLVED | Noted: 2020-10-14 | Resolved: 2021-01-18

## 2021-01-20 ENCOUNTER — ROUTINE PRENATAL (OUTPATIENT)
Dept: OBSTETRICS AND GYNECOLOGY | Facility: CLINIC | Age: 36
End: 2021-01-20
Payer: COMMERCIAL

## 2021-01-20 VITALS — DIASTOLIC BLOOD PRESSURE: 64 MMHG | SYSTOLIC BLOOD PRESSURE: 118 MMHG | BODY MASS INDEX: 26.09 KG/M2 | WEIGHT: 152 LBS

## 2021-01-20 DIAGNOSIS — O48.0 POST-TERM PREGNANCY, 40-42 WEEKS OF GESTATION: ICD-10-CM

## 2021-01-20 DIAGNOSIS — Z34.90 PREGNANCY WITH ONE FETUS, ANTEPARTUM: Primary | ICD-10-CM

## 2021-01-20 PROCEDURE — 99999 PR PBB SHADOW E&M-EST. PATIENT-LVL II: ICD-10-PCS | Mod: PBBFAC,,, | Performed by: ADVANCED PRACTICE MIDWIFE

## 2021-01-20 PROCEDURE — 0502F PR SUBSEQUENT PRENATAL CARE: ICD-10-PCS | Mod: CPTII,S$GLB,, | Performed by: ADVANCED PRACTICE MIDWIFE

## 2021-01-20 PROCEDURE — 0502F SUBSEQUENT PRENATAL CARE: CPT | Mod: CPTII,S$GLB,, | Performed by: ADVANCED PRACTICE MIDWIFE

## 2021-01-20 PROCEDURE — 99999 PR PBB SHADOW E&M-EST. PATIENT-LVL II: CPT | Mod: PBBFAC,,, | Performed by: ADVANCED PRACTICE MIDWIFE

## 2021-01-25 ENCOUNTER — HOSPITAL ENCOUNTER (OUTPATIENT)
Dept: PERINATAL CARE | Facility: OTHER | Age: 36
Discharge: HOME | End: 2021-01-25
Attending: ADVANCED PRACTICE MIDWIFE
Payer: COMMERCIAL

## 2021-01-25 ENCOUNTER — PATIENT MESSAGE (OUTPATIENT)
Dept: OBSTETRICS AND GYNECOLOGY | Facility: CLINIC | Age: 36
End: 2021-01-25

## 2021-01-25 ENCOUNTER — ROUTINE PRENATAL (OUTPATIENT)
Dept: OBSTETRICS AND GYNECOLOGY | Facility: CLINIC | Age: 36
End: 2021-01-25
Payer: COMMERCIAL

## 2021-01-25 VITALS
DIASTOLIC BLOOD PRESSURE: 76 MMHG | BODY MASS INDEX: 25.79 KG/M2 | SYSTOLIC BLOOD PRESSURE: 122 MMHG | WEIGHT: 150.25 LBS

## 2021-01-25 DIAGNOSIS — O48.0 POST-TERM PREGNANCY, 40-42 WEEKS OF GESTATION: ICD-10-CM

## 2021-01-25 DIAGNOSIS — O22.43 HEMORRHOIDS DURING PREGNANCY IN THIRD TRIMESTER: ICD-10-CM

## 2021-01-25 PROCEDURE — 99999 PR PBB SHADOW E&M-EST. PATIENT-LVL III: ICD-10-PCS | Mod: PBBFAC,,, | Performed by: ADVANCED PRACTICE MIDWIFE

## 2021-01-25 PROCEDURE — 99999 PR PBB SHADOW E&M-EST. PATIENT-LVL III: CPT | Mod: PBBFAC,,, | Performed by: ADVANCED PRACTICE MIDWIFE

## 2021-01-25 PROCEDURE — 76818 FETAL BIOPHYS PROFILE W/NST: CPT | Mod: 26,,, | Performed by: OBSTETRICS & GYNECOLOGY

## 2021-01-25 PROCEDURE — 0502F SUBSEQUENT PRENATAL CARE: CPT | Mod: CPTII,S$GLB,, | Performed by: ADVANCED PRACTICE MIDWIFE

## 2021-01-25 PROCEDURE — 59025 FETAL NON-STRESS TEST: CPT

## 2021-01-25 PROCEDURE — 76818 PR US, OB, FETAL BIOPHYSICAL, W/NST: ICD-10-PCS | Mod: 26,,, | Performed by: OBSTETRICS & GYNECOLOGY

## 2021-01-25 PROCEDURE — 76815 OB US LIMITED FETUS(S): CPT

## 2021-01-25 PROCEDURE — 0502F PR SUBSEQUENT PRENATAL CARE: ICD-10-PCS | Mod: CPTII,S$GLB,, | Performed by: ADVANCED PRACTICE MIDWIFE

## 2021-01-26 ENCOUNTER — ANESTHESIA EVENT (OUTPATIENT)
Dept: OBSTETRICS AND GYNECOLOGY | Facility: OTHER | Age: 36
End: 2021-01-26
Payer: COMMERCIAL

## 2021-01-26 ENCOUNTER — TELEPHONE (OUTPATIENT)
Dept: OBSTETRICS AND GYNECOLOGY | Facility: HOSPITAL | Age: 36
End: 2021-01-26

## 2021-01-26 ENCOUNTER — HOSPITAL ENCOUNTER (INPATIENT)
Facility: OTHER | Age: 36
LOS: 3 days | Discharge: HOME | End: 2021-01-29
Attending: OBSTETRICS & GYNECOLOGY | Admitting: OBSTETRICS & GYNECOLOGY
Payer: COMMERCIAL

## 2021-01-26 ENCOUNTER — ANESTHESIA (OUTPATIENT)
Dept: OBSTETRICS AND GYNECOLOGY | Facility: OTHER | Age: 36
End: 2021-01-26
Payer: COMMERCIAL

## 2021-01-26 DIAGNOSIS — Z98.891 S/P C-SECTION: Primary | ICD-10-CM

## 2021-01-26 DIAGNOSIS — O42.90 PROM (PREMATURE RUPTURE OF MEMBRANES): ICD-10-CM

## 2021-01-26 DIAGNOSIS — Z34.90 PREGNANCY WITH ONE FETUS, ANTEPARTUM: ICD-10-CM

## 2021-01-26 PROBLEM — O22.43 HEMORRHOIDS DURING PREGNANCY IN THIRD TRIMESTER: Status: ACTIVE | Noted: 2021-01-26

## 2021-01-26 LAB
ABO + RH BLD: NORMAL
BASOPHILS # BLD AUTO: 0.02 K/UL (ref 0–0.2)
BASOPHILS NFR BLD: 0.2 % (ref 0–1.9)
BLD GP AB SCN CELLS X3 SERPL QL: NORMAL
DIFFERENTIAL METHOD: ABNORMAL
EOSINOPHIL # BLD AUTO: 0 K/UL (ref 0–0.5)
EOSINOPHIL NFR BLD: 0.3 % (ref 0–8)
ERYTHROCYTE [DISTWIDTH] IN BLOOD BY AUTOMATED COUNT: 12.2 % (ref 11.5–14.5)
HCT VFR BLD AUTO: 36 % (ref 37–48.5)
HGB BLD-MCNC: 12.7 G/DL (ref 12–16)
IMM GRANULOCYTES # BLD AUTO: 0.03 K/UL (ref 0–0.04)
IMM GRANULOCYTES NFR BLD AUTO: 0.3 % (ref 0–0.5)
LYMPHOCYTES # BLD AUTO: 1 K/UL (ref 1–4.8)
LYMPHOCYTES NFR BLD: 10.5 % (ref 18–48)
MCH RBC QN AUTO: 32.6 PG (ref 27–31)
MCHC RBC AUTO-ENTMCNC: 35.3 G/DL (ref 32–36)
MCV RBC AUTO: 92 FL (ref 82–98)
MONOCYTES # BLD AUTO: 0.6 K/UL (ref 0.3–1)
MONOCYTES NFR BLD: 6.9 % (ref 4–15)
NEUTROPHILS # BLD AUTO: 7.6 K/UL (ref 1.8–7.7)
NEUTROPHILS NFR BLD: 81.8 % (ref 38–73)
NRBC BLD-RTO: 0 /100 WBC
PLATELET # BLD AUTO: 131 K/UL (ref 150–350)
PMV BLD AUTO: 13.8 FL (ref 9.2–12.9)
RBC # BLD AUTO: 3.9 M/UL (ref 4–5.4)
WBC # BLD AUTO: 9.25 K/UL (ref 3.9–12.7)

## 2021-01-26 PROCEDURE — 25000003 PHARM REV CODE 250: Performed by: STUDENT IN AN ORGANIZED HEALTH CARE EDUCATION/TRAINING PROGRAM

## 2021-01-26 PROCEDURE — 01968 ANES/ANALG CS DLVR NEURAXIAL: CPT | Mod: QY,,, | Performed by: ANESTHESIOLOGY

## 2021-01-26 PROCEDURE — 72100002 HC LABOR CARE, 1ST 8 HOURS

## 2021-01-26 PROCEDURE — 01968 PR INSERT CATH,ART,PERCUT,SHORTTERM: ICD-10-PCS | Mod: QY,,, | Performed by: ANESTHESIOLOGY

## 2021-01-26 PROCEDURE — 11000001 HC ACUTE MED/SURG PRIVATE ROOM

## 2021-01-26 PROCEDURE — 86900 BLOOD TYPING SEROLOGIC ABO: CPT

## 2021-01-26 PROCEDURE — 59514 PRA REAN DELIVERY ONLY: ICD-10-PCS | Mod: QY,,, | Performed by: ANESTHESIOLOGY

## 2021-01-26 PROCEDURE — 63600175 PHARM REV CODE 636 W HCPCS: Performed by: ADVANCED PRACTICE MIDWIFE

## 2021-01-26 PROCEDURE — C1751 CATH, INF, PER/CENT/MIDLINE: HCPCS | Performed by: ANESTHESIOLOGY

## 2021-01-26 PROCEDURE — 27200710 HC EPIDURAL INFUSION PUMP SET: Performed by: ANESTHESIOLOGY

## 2021-01-26 PROCEDURE — 59514 CESAREAN DELIVERY ONLY: CPT | Mod: QY,,, | Performed by: ANESTHESIOLOGY

## 2021-01-26 PROCEDURE — 85025 COMPLETE CBC W/AUTO DIFF WBC: CPT

## 2021-01-26 RX ORDER — MISOPROSTOL 200 UG/1
800 TABLET ORAL
Status: DISCONTINUED | OUTPATIENT
Start: 2021-01-26 | End: 2021-01-27

## 2021-01-26 RX ORDER — ONDANSETRON 8 MG/1
8 TABLET, ORALLY DISINTEGRATING ORAL EVERY 8 HOURS PRN
Status: DISCONTINUED | OUTPATIENT
Start: 2021-01-26 | End: 2021-01-27

## 2021-01-26 RX ORDER — BUPIVACAINE HYDROCHLORIDE 2.5 MG/ML
INJECTION, SOLUTION EPIDURAL; INFILTRATION; INTRACAUDAL
Status: DISPENSED
Start: 2021-01-26 | End: 2021-01-27

## 2021-01-26 RX ORDER — OXYTOCIN/RINGER'S LACTATE 30/500 ML
95 PLASTIC BAG, INJECTION (ML) INTRAVENOUS ONCE
Status: DISCONTINUED | OUTPATIENT
Start: 2021-01-26 | End: 2021-01-27

## 2021-01-26 RX ORDER — DEXTROSE, SODIUM CHLORIDE, SODIUM LACTATE, POTASSIUM CHLORIDE, AND CALCIUM CHLORIDE 5; .6; .31; .03; .02 G/100ML; G/100ML; G/100ML; G/100ML; G/100ML
INJECTION, SOLUTION INTRAVENOUS CONTINUOUS
Status: DISCONTINUED | OUTPATIENT
Start: 2021-01-26 | End: 2021-01-27

## 2021-01-26 RX ORDER — METHYLERGONOVINE MALEATE 0.2 MG/ML
200 INJECTION INTRAVENOUS
Status: DISCONTINUED | OUTPATIENT
Start: 2021-01-26 | End: 2021-01-27

## 2021-01-26 RX ORDER — CALCIUM CARBONATE 200(500)MG
500 TABLET,CHEWABLE ORAL 3 TIMES DAILY PRN
Status: DISCONTINUED | OUTPATIENT
Start: 2021-01-26 | End: 2021-01-27

## 2021-01-26 RX ORDER — FENTANYL/BUPIVACAINE/NS/PF 2MCG/ML-.1
PLASTIC BAG, INJECTION (ML) INJECTION
Status: COMPLETED
Start: 2021-01-26 | End: 2021-01-27

## 2021-01-26 RX ORDER — OXYTOCIN/RINGER'S LACTATE 30/500 ML
334 PLASTIC BAG, INJECTION (ML) INTRAVENOUS ONCE
Status: DISCONTINUED | OUTPATIENT
Start: 2021-01-26 | End: 2021-01-27

## 2021-01-26 RX ORDER — FENTANYL CITRATE 50 UG/ML
INJECTION, SOLUTION INTRAMUSCULAR; INTRAVENOUS
Status: COMPLETED
Start: 2021-01-26 | End: 2021-01-27

## 2021-01-26 RX ORDER — CARBOPROST TROMETHAMINE 250 UG/ML
250 INJECTION, SOLUTION INTRAMUSCULAR
Status: DISCONTINUED | OUTPATIENT
Start: 2021-01-26 | End: 2021-01-27

## 2021-01-26 RX ORDER — OXYTOCIN/RINGER'S LACTATE 30/500 ML
0-30 PLASTIC BAG, INJECTION (ML) INTRAVENOUS CONTINUOUS
Status: DISCONTINUED | OUTPATIENT
Start: 2021-01-26 | End: 2021-01-27

## 2021-01-26 RX ORDER — SIMETHICONE 80 MG
1 TABLET,CHEWABLE ORAL 4 TIMES DAILY PRN
Status: DISCONTINUED | OUTPATIENT
Start: 2021-01-26 | End: 2021-01-27

## 2021-01-26 RX ADMIN — FENTANYL CITRATE 50 MCG: 50 INJECTION, SOLUTION INTRAMUSCULAR; INTRAVENOUS at 11:01

## 2021-01-26 RX ADMIN — BUPIVACAINE HYDROCHLORIDE 4 ML: 2.5 INJECTION, SOLUTION INFILTRATION; PERINEURAL at 11:01

## 2021-01-26 RX ADMIN — Medication 10 ML/HR: at 11:01

## 2021-01-26 RX ADMIN — LIDOCAINE HYDROCHLORIDE,EPINEPHRINE BITARTRATE 3 ML: 15; .005 INJECTION, SOLUTION EPIDURAL; INFILTRATION; INTRACAUDAL; PERINEURAL at 11:01

## 2021-01-26 RX ADMIN — DEXTROSE, SODIUM CHLORIDE, SODIUM LACTATE, POTASSIUM CHLORIDE, AND CALCIUM CHLORIDE 125 ML/HR: 5; .6; .31; .03; .02 INJECTION, SOLUTION INTRAVENOUS at 04:01

## 2021-01-26 RX ADMIN — Medication 2 MILLI-UNITS/MIN: at 03:01

## 2021-01-27 PROBLEM — Z98.891 S/P C-SECTION: Status: ACTIVE | Noted: 2021-01-27

## 2021-01-27 PROBLEM — O42.90 PROLONGED RUPTURE OF MEMBRANES: Status: ACTIVE | Noted: 2021-01-27

## 2021-01-27 LAB
ALLENS TEST: ABNORMAL
ALLENS TEST: ABNORMAL
DELSYS: ABNORMAL
DELSYS: ABNORMAL
HCO3 UR-SCNC: 21.9 MMOL/L (ref 24–28)
HCO3 UR-SCNC: 22.3 MMOL/L (ref 24–28)
HCT VFR BLD CALC: 57 %PCV (ref 36–54)
HCT VFR BLD CALC: 57 %PCV (ref 36–54)
HGB BLD-MCNC: 19 G/DL
HGB BLD-MCNC: 19 G/DL
PCO2 BLDA: 83.5 MMHG (ref 35–45)
PCO2 BLDA: 88.9 MMHG (ref 35–45)
PH SMN: 7.01 [PH] (ref 7.35–7.45)
PH SMN: 7.03 [PH] (ref 7.35–7.45)
PO2 BLDA: 9 MMHG (ref 80–100)
PO2 BLDA: 9 MMHG (ref 80–100)
POC BE: -9 MMOL/L
POC BE: -9 MMOL/L
POC IONIZED CALCIUM: 1.5 MMOL/L (ref 1.06–1.42)
POC IONIZED CALCIUM: 1.52 MMOL/L (ref 1.06–1.42)
POC SATURATED O2: 4 % (ref 95–100)
POC SATURATED O2: 5 % (ref 95–100)
POC TCO2: 24 MMOL/L (ref 23–27)
POC TCO2: 25 MMOL/L (ref 23–27)
POTASSIUM BLD-SCNC: 5.3 MMOL/L (ref 3.5–5.1)
POTASSIUM BLD-SCNC: 5.5 MMOL/L (ref 3.5–5.1)
SAMPLE: ABNORMAL
SAMPLE: ABNORMAL
SITE: ABNORMAL
SITE: ABNORMAL
SODIUM BLD-SCNC: 134 MMOL/L (ref 136–145)
SODIUM BLD-SCNC: 134 MMOL/L (ref 136–145)

## 2021-01-27 PROCEDURE — 37000008 HC ANESTHESIA 1ST 15 MINUTES: Performed by: OBSTETRICS & GYNECOLOGY

## 2021-01-27 PROCEDURE — 37000009 HC ANESTHESIA EA ADD 15 MINS: Performed by: OBSTETRICS & GYNECOLOGY

## 2021-01-27 PROCEDURE — 51702 INSERT TEMP BLADDER CATH: CPT

## 2021-01-27 PROCEDURE — 63600175 PHARM REV CODE 636 W HCPCS: Performed by: ADVANCED PRACTICE MIDWIFE

## 2021-01-27 PROCEDURE — 25000003 PHARM REV CODE 250: Performed by: STUDENT IN AN ORGANIZED HEALTH CARE EDUCATION/TRAINING PROGRAM

## 2021-01-27 PROCEDURE — 59510 CESAREAN DELIVERY: CPT | Mod: GB,,, | Performed by: OBSTETRICS & GYNECOLOGY

## 2021-01-27 PROCEDURE — 99900035 HC TECH TIME PER 15 MIN (STAT)

## 2021-01-27 PROCEDURE — 71000039 HC RECOVERY, EACH ADD'L HOUR: Performed by: OBSTETRICS & GYNECOLOGY

## 2021-01-27 PROCEDURE — 82803 BLOOD GASES ANY COMBINATION: CPT

## 2021-01-27 PROCEDURE — 59510 PR FULL ROUT OBSTE CARE,CESAREAN DELIV: ICD-10-PCS | Mod: GB,,, | Performed by: OBSTETRICS & GYNECOLOGY

## 2021-01-27 PROCEDURE — 63600175 PHARM REV CODE 636 W HCPCS: Performed by: STUDENT IN AN ORGANIZED HEALTH CARE EDUCATION/TRAINING PROGRAM

## 2021-01-27 PROCEDURE — 25000003 PHARM REV CODE 250: Performed by: ADVANCED PRACTICE MIDWIFE

## 2021-01-27 PROCEDURE — 11000001 HC ACUTE MED/SURG PRIVATE ROOM

## 2021-01-27 PROCEDURE — 0502F PR SUBSEQUENT PRENATAL CARE: ICD-10-PCS | Mod: CPTII,,, | Performed by: STUDENT IN AN ORGANIZED HEALTH CARE EDUCATION/TRAINING PROGRAM

## 2021-01-27 PROCEDURE — 36004725 HC OB OR TIME LEV III - EA ADD 15 MIN: Performed by: OBSTETRICS & GYNECOLOGY

## 2021-01-27 PROCEDURE — 85025 COMPLETE CBC W/AUTO DIFF WBC: CPT

## 2021-01-27 PROCEDURE — 0502F SUBSEQUENT PRENATAL CARE: CPT | Mod: CPTII,,, | Performed by: STUDENT IN AN ORGANIZED HEALTH CARE EDUCATION/TRAINING PROGRAM

## 2021-01-27 PROCEDURE — 72100003 HC LABOR CARE, EA. ADDL. 8 HRS

## 2021-01-27 PROCEDURE — 71000033 HC RECOVERY, INTIAL HOUR: Performed by: OBSTETRICS & GYNECOLOGY

## 2021-01-27 PROCEDURE — 36415 COLL VENOUS BLD VENIPUNCTURE: CPT

## 2021-01-27 PROCEDURE — 62326 NJX INTERLAMINAR LMBR/SAC: CPT | Performed by: STUDENT IN AN ORGANIZED HEALTH CARE EDUCATION/TRAINING PROGRAM

## 2021-01-27 PROCEDURE — 36004724 HC OB OR TIME LEV III - 1ST 15 MIN: Performed by: OBSTETRICS & GYNECOLOGY

## 2021-01-27 RX ORDER — DEXAMETHASONE SODIUM PHOSPHATE 4 MG/ML
INJECTION, SOLUTION INTRA-ARTICULAR; INTRALESIONAL; INTRAMUSCULAR; INTRAVENOUS; SOFT TISSUE
Status: DISCONTINUED | OUTPATIENT
Start: 2021-01-27 | End: 2021-01-27

## 2021-01-27 RX ORDER — ACETAMINOPHEN 500 MG
1000 TABLET ORAL EVERY 6 HOURS PRN
Status: DISCONTINUED | OUTPATIENT
Start: 2021-01-27 | End: 2021-01-29 | Stop reason: HOSPADM

## 2021-01-27 RX ORDER — OXYCODONE AND ACETAMINOPHEN 5; 325 MG/1; MG/1
1 TABLET ORAL EVERY 4 HOURS PRN
Status: DISCONTINUED | OUTPATIENT
Start: 2021-01-28 | End: 2021-01-29 | Stop reason: HOSPADM

## 2021-01-27 RX ORDER — ADHESIVE BANDAGE
30 BANDAGE TOPICAL 2 TIMES DAILY PRN
Status: DISCONTINUED | OUTPATIENT
Start: 2021-01-28 | End: 2021-01-29 | Stop reason: HOSPADM

## 2021-01-27 RX ORDER — PHENYLEPHRINE HYDROCHLORIDE 10 MG/ML
INJECTION INTRAVENOUS
Status: DISCONTINUED | OUTPATIENT
Start: 2021-01-27 | End: 2021-01-27

## 2021-01-27 RX ORDER — PROMETHAZINE HYDROCHLORIDE 25 MG/ML
INJECTION, SOLUTION INTRAMUSCULAR; INTRAVENOUS
Status: DISCONTINUED | OUTPATIENT
Start: 2021-01-27 | End: 2021-01-27

## 2021-01-27 RX ORDER — BISACODYL 10 MG
10 SUPPOSITORY, RECTAL RECTAL ONCE AS NEEDED
Status: DISCONTINUED | OUTPATIENT
Start: 2021-01-27 | End: 2021-01-29 | Stop reason: HOSPADM

## 2021-01-27 RX ORDER — IBUPROFEN 600 MG/1
600 TABLET ORAL EVERY 6 HOURS
Status: DISCONTINUED | OUTPATIENT
Start: 2021-01-28 | End: 2021-01-29 | Stop reason: HOSPADM

## 2021-01-27 RX ORDER — FAMOTIDINE 10 MG/ML
20 INJECTION INTRAVENOUS ONCE
Status: COMPLETED | OUTPATIENT
Start: 2021-01-27 | End: 2021-01-27

## 2021-01-27 RX ORDER — LIDOCAINE HYDROCHLORIDE AND EPINEPHRINE 15; 5 MG/ML; UG/ML
INJECTION, SOLUTION EPIDURAL
Status: DISCONTINUED | OUTPATIENT
Start: 2021-01-26 | End: 2021-01-27

## 2021-01-27 RX ORDER — DIPHENHYDRAMINE HCL 25 MG
25 CAPSULE ORAL EVERY 4 HOURS PRN
Status: DISCONTINUED | OUTPATIENT
Start: 2021-01-27 | End: 2021-01-29 | Stop reason: HOSPADM

## 2021-01-27 RX ORDER — HYDROCORTISONE 25 MG/G
CREAM TOPICAL 3 TIMES DAILY PRN
Status: DISCONTINUED | OUTPATIENT
Start: 2021-01-27 | End: 2021-01-29 | Stop reason: HOSPADM

## 2021-01-27 RX ORDER — BUPIVACAINE HYDROCHLORIDE 2.5 MG/ML
INJECTION, SOLUTION INFILTRATION; PERINEURAL
Status: DISCONTINUED | OUTPATIENT
Start: 2021-01-26 | End: 2021-01-27

## 2021-01-27 RX ORDER — FENTANYL/BUPIVACAINE/NS/PF 2MCG/ML-.1
PLASTIC BAG, INJECTION (ML) INJECTION CONTINUOUS
Status: DISCONTINUED | OUTPATIENT
Start: 2021-01-27 | End: 2021-01-27

## 2021-01-27 RX ORDER — SODIUM CHLORIDE, SODIUM LACTATE, POTASSIUM CHLORIDE, CALCIUM CHLORIDE 600; 310; 30; 20 MG/100ML; MG/100ML; MG/100ML; MG/100ML
INJECTION, SOLUTION INTRAVENOUS CONTINUOUS
Status: ACTIVE | OUTPATIENT
Start: 2021-01-27 | End: 2021-01-28

## 2021-01-27 RX ORDER — SIMETHICONE 80 MG
1 TABLET,CHEWABLE ORAL EVERY 6 HOURS PRN
Status: DISCONTINUED | OUTPATIENT
Start: 2021-01-27 | End: 2021-01-29 | Stop reason: HOSPADM

## 2021-01-27 RX ORDER — OXYTOCIN/RINGER'S LACTATE 30/500 ML
95 PLASTIC BAG, INJECTION (ML) INTRAVENOUS ONCE
Status: DISCONTINUED | OUTPATIENT
Start: 2021-01-27 | End: 2021-01-27

## 2021-01-27 RX ORDER — OXYCODONE AND ACETAMINOPHEN 10; 325 MG/1; MG/1
1 TABLET ORAL EVERY 4 HOURS PRN
Status: DISCONTINUED | OUTPATIENT
Start: 2021-01-28 | End: 2021-01-29 | Stop reason: HOSPADM

## 2021-01-27 RX ORDER — SODIUM CHLORIDE, SODIUM LACTATE, POTASSIUM CHLORIDE, CALCIUM CHLORIDE 600; 310; 30; 20 MG/100ML; MG/100ML; MG/100ML; MG/100ML
INJECTION, SOLUTION INTRAVENOUS CONTINUOUS PRN
Status: DISCONTINUED | OUTPATIENT
Start: 2021-01-27 | End: 2021-01-27

## 2021-01-27 RX ORDER — OXYCODONE HYDROCHLORIDE 5 MG/1
10 TABLET ORAL EVERY 4 HOURS PRN
Status: ACTIVE | OUTPATIENT
Start: 2021-01-27 | End: 2021-01-28

## 2021-01-27 RX ORDER — ONDANSETRON 2 MG/ML
4 INJECTION INTRAMUSCULAR; INTRAVENOUS EVERY 6 HOURS PRN
Status: ACTIVE | OUTPATIENT
Start: 2021-01-27 | End: 2021-01-28

## 2021-01-27 RX ORDER — ACETAMINOPHEN 325 MG/1
650 TABLET ORAL EVERY 6 HOURS
Status: DISPENSED | OUTPATIENT
Start: 2021-01-27 | End: 2021-01-28

## 2021-01-27 RX ORDER — SODIUM CITRATE AND CITRIC ACID MONOHYDRATE 334; 500 MG/5ML; MG/5ML
30 SOLUTION ORAL ONCE
Status: COMPLETED | OUTPATIENT
Start: 2021-01-27 | End: 2021-01-27

## 2021-01-27 RX ORDER — MORPHINE SULFATE 0.5 MG/ML
INJECTION, SOLUTION EPIDURAL; INTRATHECAL; INTRAVENOUS
Status: DISCONTINUED | OUTPATIENT
Start: 2021-01-27 | End: 2021-01-27

## 2021-01-27 RX ORDER — DOCUSATE SODIUM 100 MG/1
200 CAPSULE, LIQUID FILLED ORAL 2 TIMES DAILY
Status: DISCONTINUED | OUTPATIENT
Start: 2021-01-27 | End: 2021-01-29 | Stop reason: HOSPADM

## 2021-01-27 RX ORDER — FENTANYL CITRATE 50 UG/ML
INJECTION, SOLUTION INTRAMUSCULAR; INTRAVENOUS
Status: DISCONTINUED | OUTPATIENT
Start: 2021-01-26 | End: 2021-01-27

## 2021-01-27 RX ORDER — OXYCODONE HYDROCHLORIDE 5 MG/1
5 TABLET ORAL EVERY 4 HOURS PRN
Status: ACTIVE | OUTPATIENT
Start: 2021-01-27 | End: 2021-01-28

## 2021-01-27 RX ORDER — KETOROLAC TROMETHAMINE 30 MG/ML
30 INJECTION, SOLUTION INTRAMUSCULAR; INTRAVENOUS EVERY 6 HOURS
Status: DISPENSED | OUTPATIENT
Start: 2021-01-27 | End: 2021-01-28

## 2021-01-27 RX ORDER — CHLOROPROCAINE HYDROCHLORIDE 30 MG/ML
INJECTION, SOLUTION EPIDURAL; INFILTRATION; INTRACAUDAL; PERINEURAL
Status: DISCONTINUED | OUTPATIENT
Start: 2021-01-27 | End: 2021-01-27

## 2021-01-27 RX ORDER — PRENATAL WITH FERROUS FUM AND FOLIC ACID 3080; 920; 120; 400; 22; 1.84; 3; 20; 10; 1; 12; 200; 27; 25; 2 [IU]/1; [IU]/1; MG/1; [IU]/1; MG/1; MG/1; MG/1; MG/1; MG/1; MG/1; UG/1; MG/1; MG/1; MG/1; MG/1
1 TABLET ORAL DAILY
Status: DISCONTINUED | OUTPATIENT
Start: 2021-01-27 | End: 2021-01-29 | Stop reason: HOSPADM

## 2021-01-27 RX ORDER — ONDANSETRON 2 MG/ML
INJECTION INTRAMUSCULAR; INTRAVENOUS
Status: DISCONTINUED | OUTPATIENT
Start: 2021-01-27 | End: 2021-01-27

## 2021-01-27 RX ORDER — CLINDAMYCIN PHOSPHATE 900 MG/50ML
900 INJECTION, SOLUTION INTRAVENOUS
Status: COMPLETED | OUTPATIENT
Start: 2021-01-27 | End: 2021-01-28

## 2021-01-27 RX ORDER — AMOXICILLIN 250 MG
1 CAPSULE ORAL NIGHTLY PRN
Status: DISCONTINUED | OUTPATIENT
Start: 2021-01-27 | End: 2021-01-29 | Stop reason: HOSPADM

## 2021-01-27 RX ORDER — ONDANSETRON 8 MG/1
8 TABLET, ORALLY DISINTEGRATING ORAL EVERY 8 HOURS PRN
Status: DISCONTINUED | OUTPATIENT
Start: 2021-01-28 | End: 2021-01-29 | Stop reason: HOSPADM

## 2021-01-27 RX ORDER — OXYTOCIN 10 [USP'U]/ML
INJECTION, SOLUTION INTRAMUSCULAR; INTRAVENOUS
Status: DISCONTINUED | OUTPATIENT
Start: 2021-01-27 | End: 2021-01-27

## 2021-01-27 RX ADMIN — Medication 2 MILLI-UNITS/MIN: at 08:01

## 2021-01-27 RX ADMIN — SODIUM CHLORIDE, SODIUM LACTATE, POTASSIUM CHLORIDE, AND CALCIUM CHLORIDE: 600; 310; 30; 20 INJECTION, SOLUTION INTRAVENOUS at 09:01

## 2021-01-27 RX ADMIN — SODIUM CITRATE AND CITRIC ACID MONOHYDRATE 30 ML: 500; 334 SOLUTION ORAL at 09:01

## 2021-01-27 RX ADMIN — AZITHROMYCIN 500 MG: 500 INJECTION, POWDER, LYOPHILIZED, FOR SOLUTION INTRAVENOUS at 09:01

## 2021-01-27 RX ADMIN — DEXTROSE 2 G: 50 INJECTION, SOLUTION INTRAVENOUS at 09:01

## 2021-01-27 RX ADMIN — PHENYLEPHRINE HYDROCHLORIDE 100 MCG: 10 INJECTION INTRAVENOUS at 10:01

## 2021-01-27 RX ADMIN — CLINDAMYCIN IN 5 PERCENT DEXTROSE 900 MG: 18 INJECTION, SOLUTION INTRAVENOUS at 05:01

## 2021-01-27 RX ADMIN — CHLOROPROCAINE HYDROCHLORIDE 5 MG: 30 INJECTION, SOLUTION EPIDURAL; INFILTRATION; INTRACAUDAL; PERINEURAL at 09:01

## 2021-01-27 RX ADMIN — PHENYLEPHRINE HYDROCHLORIDE 200 MCG: 10 INJECTION INTRAVENOUS at 10:01

## 2021-01-27 RX ADMIN — OXYTOCIN 6 UNITS: 10 INJECTION, SOLUTION INTRAMUSCULAR; INTRAVENOUS at 10:01

## 2021-01-27 RX ADMIN — FENTANYL CITRATE 100 MCG: 50 INJECTION, SOLUTION INTRAMUSCULAR; INTRAVENOUS at 09:01

## 2021-01-27 RX ADMIN — GENTAMICIN SULFATE 300 MG: 40 INJECTION, SOLUTION INTRAMUSCULAR; INTRAVENOUS at 10:01

## 2021-01-27 RX ADMIN — CHLOROPROCAINE HYDROCHLORIDE 3 ML: 30 INJECTION, SOLUTION EPIDURAL; INFILTRATION; INTRACAUDAL; PERINEURAL at 10:01

## 2021-01-27 RX ADMIN — DOCUSATE SODIUM 200 MG: 100 CAPSULE, LIQUID FILLED ORAL at 08:01

## 2021-01-27 RX ADMIN — KETOROLAC TROMETHAMINE 30 MG: 30 INJECTION, SOLUTION INTRAMUSCULAR at 08:01

## 2021-01-27 RX ADMIN — DEXAMETHASONE SODIUM PHOSPHATE 4 MG: 4 INJECTION, SOLUTION INTRAMUSCULAR; INTRAVENOUS at 09:01

## 2021-01-27 RX ADMIN — ACETAMINOPHEN 1000 MG: 500 TABLET, FILM COATED ORAL at 07:01

## 2021-01-27 RX ADMIN — Medication 1 MG: at 10:01

## 2021-01-27 RX ADMIN — AMPICILLIN SODIUM 2 G: 2 INJECTION, POWDER, FOR SOLUTION INTRAMUSCULAR; INTRAVENOUS at 08:01

## 2021-01-27 RX ADMIN — AMPICILLIN SODIUM 2 G: 2 INJECTION, POWDER, FOR SOLUTION INTRAMUSCULAR; INTRAVENOUS at 04:01

## 2021-01-27 RX ADMIN — AMPICILLIN SODIUM 2 G: 2 INJECTION, POWDER, FOR SOLUTION INTRAMUSCULAR; INTRAVENOUS at 10:01

## 2021-01-27 RX ADMIN — CLINDAMYCIN IN 5 PERCENT DEXTROSE 900 MG: 18 INJECTION, SOLUTION INTRAVENOUS at 10:01

## 2021-01-27 RX ADMIN — PROMETHAZINE HYDROCHLORIDE 12.5 MG: 25 INJECTION INTRAMUSCULAR; INTRAVENOUS at 10:01

## 2021-01-27 RX ADMIN — KETOROLAC TROMETHAMINE 30 MG: 30 INJECTION, SOLUTION INTRAMUSCULAR at 01:01

## 2021-01-27 RX ADMIN — FAMOTIDINE 20 MG: 10 INJECTION INTRAVENOUS at 09:01

## 2021-01-27 RX ADMIN — ONDANSETRON HYDROCHLORIDE 4 MG: 2 INJECTION INTRAMUSCULAR; INTRAVENOUS at 09:01

## 2021-01-27 RX ADMIN — DEXTROSE, SODIUM CHLORIDE, SODIUM LACTATE, POTASSIUM CHLORIDE, AND CALCIUM CHLORIDE: 5; .6; .31; .03; .02 INJECTION, SOLUTION INTRAVENOUS at 01:01

## 2021-01-28 PROBLEM — O41.1290 CHORIOAMNIONITIS, DELIVERED, CURRENT HOSPITALIZATION: Status: ACTIVE | Noted: 2021-01-28

## 2021-01-28 LAB
BASOPHILS # BLD AUTO: 0.02 K/UL (ref 0–0.2)
BASOPHILS NFR BLD: 0.1 % (ref 0–1.9)
DIFFERENTIAL METHOD: ABNORMAL
EOSINOPHIL # BLD AUTO: 0 K/UL (ref 0–0.5)
EOSINOPHIL NFR BLD: 0 % (ref 0–8)
ERYTHROCYTE [DISTWIDTH] IN BLOOD BY AUTOMATED COUNT: 12.3 % (ref 11.5–14.5)
HCT VFR BLD AUTO: 31 % (ref 37–48.5)
HGB BLD-MCNC: 10.8 G/DL (ref 12–16)
IMM GRANULOCYTES # BLD AUTO: 0.08 K/UL (ref 0–0.04)
IMM GRANULOCYTES NFR BLD AUTO: 0.4 % (ref 0–0.5)
LYMPHOCYTES # BLD AUTO: 0.8 K/UL (ref 1–4.8)
LYMPHOCYTES NFR BLD: 4.3 % (ref 18–48)
MCH RBC QN AUTO: 32.2 PG (ref 27–31)
MCHC RBC AUTO-ENTMCNC: 34.8 G/DL (ref 32–36)
MCV RBC AUTO: 93 FL (ref 82–98)
MONOCYTES # BLD AUTO: 1 K/UL (ref 0.3–1)
MONOCYTES NFR BLD: 5.3 % (ref 4–15)
NEUTROPHILS # BLD AUTO: 17.2 K/UL (ref 1.8–7.7)
NEUTROPHILS NFR BLD: 89.9 % (ref 38–73)
NRBC BLD-RTO: 0 /100 WBC
PLATELET # BLD AUTO: 111 K/UL (ref 150–350)
PMV BLD AUTO: 13 FL (ref 9.2–12.9)
RBC # BLD AUTO: 3.35 M/UL (ref 4–5.4)
WBC # BLD AUTO: 19.12 K/UL (ref 3.9–12.7)

## 2021-01-28 PROCEDURE — 99024 POSTOP FOLLOW-UP VISIT: CPT | Mod: ,,, | Performed by: OBSTETRICS & GYNECOLOGY

## 2021-01-28 PROCEDURE — 25000003 PHARM REV CODE 250: Performed by: STUDENT IN AN ORGANIZED HEALTH CARE EDUCATION/TRAINING PROGRAM

## 2021-01-28 PROCEDURE — 99024 PR POST-OP FOLLOW-UP VISIT: ICD-10-PCS | Mod: ,,, | Performed by: OBSTETRICS & GYNECOLOGY

## 2021-01-28 PROCEDURE — 63600175 PHARM REV CODE 636 W HCPCS: Performed by: STUDENT IN AN ORGANIZED HEALTH CARE EDUCATION/TRAINING PROGRAM

## 2021-01-28 PROCEDURE — 11000001 HC ACUTE MED/SURG PRIVATE ROOM

## 2021-01-28 RX ADMIN — DOCUSATE SODIUM 200 MG: 100 CAPSULE, LIQUID FILLED ORAL at 09:01

## 2021-01-28 RX ADMIN — KETOROLAC TROMETHAMINE 30 MG: 30 INJECTION, SOLUTION INTRAMUSCULAR at 02:01

## 2021-01-28 RX ADMIN — AMPICILLIN SODIUM 2 G: 2 INJECTION, POWDER, FOR SOLUTION INTRAMUSCULAR; INTRAVENOUS at 09:01

## 2021-01-28 RX ADMIN — CLINDAMYCIN IN 5 PERCENT DEXTROSE 900 MG: 18 INJECTION, SOLUTION INTRAVENOUS at 02:01

## 2021-01-28 RX ADMIN — DOCUSATE SODIUM 200 MG: 100 CAPSULE, LIQUID FILLED ORAL at 08:01

## 2021-01-28 RX ADMIN — PRENATAL VIT W/ FE FUMARATE-FA TAB 27-0.8 MG 1 TABLET: 27-0.8 TAB at 09:01

## 2021-01-28 RX ADMIN — AMPICILLIN SODIUM 2 G: 2 INJECTION, POWDER, FOR SOLUTION INTRAMUSCULAR; INTRAVENOUS at 03:01

## 2021-01-29 VITALS
RESPIRATION RATE: 18 BRPM | WEIGHT: 150 LBS | BODY MASS INDEX: 25.61 KG/M2 | HEIGHT: 64 IN | TEMPERATURE: 98 F | DIASTOLIC BLOOD PRESSURE: 80 MMHG | OXYGEN SATURATION: 97 % | HEART RATE: 73 BPM | SYSTOLIC BLOOD PRESSURE: 123 MMHG

## 2021-01-29 PROCEDURE — 25000003 PHARM REV CODE 250: Performed by: STUDENT IN AN ORGANIZED HEALTH CARE EDUCATION/TRAINING PROGRAM

## 2021-01-29 PROCEDURE — 99024 PR POST-OP FOLLOW-UP VISIT: ICD-10-PCS | Mod: ,,, | Performed by: OBSTETRICS & GYNECOLOGY

## 2021-01-29 PROCEDURE — 99024 POSTOP FOLLOW-UP VISIT: CPT | Mod: ,,, | Performed by: OBSTETRICS & GYNECOLOGY

## 2021-01-29 RX ORDER — ENOXAPARIN SODIUM 100 MG/ML
40 INJECTION SUBCUTANEOUS EVERY 24 HOURS
Status: DISCONTINUED | OUTPATIENT
Start: 2021-01-29 | End: 2021-01-29

## 2021-01-29 RX ORDER — DOCUSATE SODIUM 100 MG/1
200 CAPSULE, LIQUID FILLED ORAL 2 TIMES DAILY
Refills: 0 | COMMUNITY
Start: 2021-01-29 | End: 2021-06-04

## 2021-01-29 RX ORDER — OXYCODONE AND ACETAMINOPHEN 5; 325 MG/1; MG/1
1 TABLET ORAL EVERY 4 HOURS PRN
Qty: 20 TABLET | Refills: 0 | Status: SHIPPED | OUTPATIENT
Start: 2021-01-29 | End: 2021-03-16

## 2021-01-29 RX ORDER — ENOXAPARIN SODIUM 100 MG/ML
40 INJECTION SUBCUTANEOUS EVERY 24 HOURS
Status: DISCONTINUED | OUTPATIENT
Start: 2021-01-29 | End: 2021-01-29 | Stop reason: HOSPADM

## 2021-01-29 RX ORDER — MUPIROCIN 20 MG/G
OINTMENT TOPICAL 3 TIMES DAILY
Qty: 30 G | Refills: 1 | Status: SHIPPED | OUTPATIENT
Start: 2021-01-29 | End: 2021-01-29 | Stop reason: SDUPTHER

## 2021-01-29 RX ORDER — IBUPROFEN 600 MG/1
600 TABLET ORAL EVERY 6 HOURS PRN
Qty: 30 TABLET | Refills: 3 | Status: SHIPPED | OUTPATIENT
Start: 2021-01-29 | End: 2021-03-16

## 2021-01-29 RX ADMIN — PRENATAL VIT W/ FE FUMARATE-FA TAB 27-0.8 MG 1 TABLET: 27-0.8 TAB at 08:01

## 2021-01-29 RX ADMIN — DOCUSATE SODIUM 200 MG: 100 CAPSULE, LIQUID FILLED ORAL at 08:01

## 2021-02-01 ENCOUNTER — PATIENT MESSAGE (OUTPATIENT)
Dept: OBSTETRICS AND GYNECOLOGY | Facility: CLINIC | Age: 36
End: 2021-02-01

## 2021-02-05 ENCOUNTER — PATIENT MESSAGE (OUTPATIENT)
Dept: OBSTETRICS AND GYNECOLOGY | Facility: CLINIC | Age: 36
End: 2021-02-05

## 2021-02-07 ENCOUNTER — PATIENT MESSAGE (OUTPATIENT)
Dept: OBSTETRICS AND GYNECOLOGY | Facility: CLINIC | Age: 36
End: 2021-02-07

## 2021-02-10 ENCOUNTER — POSTPARTUM VISIT (OUTPATIENT)
Dept: OBSTETRICS AND GYNECOLOGY | Facility: CLINIC | Age: 36
End: 2021-02-10
Payer: COMMERCIAL

## 2021-02-10 VITALS
DIASTOLIC BLOOD PRESSURE: 74 MMHG | SYSTOLIC BLOOD PRESSURE: 120 MMHG | BODY MASS INDEX: 22.28 KG/M2 | HEIGHT: 64 IN | WEIGHT: 130.5 LBS

## 2021-02-10 PROCEDURE — 99999 PR PBB SHADOW E&M-EST. PATIENT-LVL III: CPT | Mod: PBBFAC,,, | Performed by: ADVANCED PRACTICE MIDWIFE

## 2021-02-10 PROCEDURE — 0503F POSTPARTUM CARE VISIT: CPT | Mod: S$GLB,,, | Performed by: ADVANCED PRACTICE MIDWIFE

## 2021-02-10 PROCEDURE — 99999 PR PBB SHADOW E&M-EST. PATIENT-LVL III: ICD-10-PCS | Mod: PBBFAC,,, | Performed by: ADVANCED PRACTICE MIDWIFE

## 2021-02-10 PROCEDURE — 0503F PR POSTPARTUM CARE VISIT: ICD-10-PCS | Mod: S$GLB,,, | Performed by: ADVANCED PRACTICE MIDWIFE

## 2021-02-23 ENCOUNTER — PATIENT MESSAGE (OUTPATIENT)
Dept: OBSTETRICS AND GYNECOLOGY | Facility: CLINIC | Age: 36
End: 2021-02-23

## 2021-02-24 DIAGNOSIS — Z98.891 S/P CESAREAN SECTION: Primary | ICD-10-CM

## 2021-02-24 DIAGNOSIS — K64.9 HEMORRHOIDS, UNSPECIFIED HEMORRHOID TYPE: ICD-10-CM

## 2021-03-16 ENCOUNTER — POSTPARTUM VISIT (OUTPATIENT)
Dept: OBSTETRICS AND GYNECOLOGY | Facility: CLINIC | Age: 36
End: 2021-03-16
Payer: COMMERCIAL

## 2021-03-16 VITALS
HEIGHT: 64 IN | WEIGHT: 125.25 LBS | DIASTOLIC BLOOD PRESSURE: 70 MMHG | BODY MASS INDEX: 21.38 KG/M2 | SYSTOLIC BLOOD PRESSURE: 110 MMHG

## 2021-03-16 DIAGNOSIS — K64.9 HEMORRHOIDS, UNSPECIFIED HEMORRHOID TYPE: Primary | ICD-10-CM

## 2021-03-16 PROCEDURE — 99999 PR PBB SHADOW E&M-EST. PATIENT-LVL II: ICD-10-PCS | Mod: PBBFAC,,, | Performed by: ADVANCED PRACTICE MIDWIFE

## 2021-03-16 PROCEDURE — 0503F POSTPARTUM CARE VISIT: CPT | Mod: S$GLB,,, | Performed by: ADVANCED PRACTICE MIDWIFE

## 2021-03-16 PROCEDURE — 99999 PR PBB SHADOW E&M-EST. PATIENT-LVL II: CPT | Mod: PBBFAC,,, | Performed by: ADVANCED PRACTICE MIDWIFE

## 2021-03-16 PROCEDURE — 0503F PR POSTPARTUM CARE VISIT: ICD-10-PCS | Mod: S$GLB,,, | Performed by: ADVANCED PRACTICE MIDWIFE

## 2021-03-16 RX ORDER — HYDROCORTISONE ACETATE 25 MG/1
25 SUPPOSITORY RECTAL 2 TIMES DAILY
Qty: 28 SUPPOSITORY | Refills: 1 | Status: SHIPPED | OUTPATIENT
Start: 2021-03-16 | End: 2021-03-30

## 2021-03-16 RX ORDER — HYDROCORTISONE 25 MG/G
CREAM TOPICAL 2 TIMES DAILY
Qty: 28 G | Refills: 1 | Status: SHIPPED | OUTPATIENT
Start: 2021-03-16 | End: 2021-06-04

## 2021-03-17 ENCOUNTER — PATIENT MESSAGE (OUTPATIENT)
Dept: OBSTETRICS AND GYNECOLOGY | Facility: CLINIC | Age: 36
End: 2021-03-17

## 2021-04-06 ENCOUNTER — PATIENT MESSAGE (OUTPATIENT)
Dept: OBSTETRICS AND GYNECOLOGY | Facility: CLINIC | Age: 36
End: 2021-04-06

## 2021-04-19 ENCOUNTER — PATIENT MESSAGE (OUTPATIENT)
Dept: OBSTETRICS AND GYNECOLOGY | Facility: CLINIC | Age: 36
End: 2021-04-19

## 2021-04-28 ENCOUNTER — PATIENT MESSAGE (OUTPATIENT)
Dept: RESEARCH | Facility: HOSPITAL | Age: 36
End: 2021-04-28

## 2021-04-30 ENCOUNTER — PATIENT MESSAGE (OUTPATIENT)
Dept: OBSTETRICS AND GYNECOLOGY | Facility: CLINIC | Age: 36
End: 2021-04-30

## 2021-04-30 DIAGNOSIS — F41.9 ANXIETY: Primary | ICD-10-CM

## 2021-04-30 PROBLEM — F41.8 POSTPARTUM ANXIETY: Status: ACTIVE | Noted: 2021-04-30

## 2021-04-30 RX ORDER — BUSPIRONE HYDROCHLORIDE 10 MG/1
10 TABLET ORAL 2 TIMES DAILY
Qty: 14 TABLET | Refills: 0 | Status: SHIPPED | OUTPATIENT
Start: 2021-04-30 | End: 2021-05-04

## 2021-04-30 NOTE — PROGRESS NOTES
Doing well.  NO complaints.  NO cramping or bleeding.  PMHx and SHx reviewed.  Reviewed GYN and OB Hx.  U/S today with +FHTs.  Interested in genetics.  She will call with pricing and let us know what she wants to do.  Discussed carrier screening too and she will let us know.  Also discussed meds for nausea.  All questions answered.  RTC in 4 weeks or sooner if needed.     [Follow-Up: _____] : a [unfilled] follow-up visit

## 2021-05-03 ENCOUNTER — CLINICAL SUPPORT (OUTPATIENT)
Dept: REHABILITATION | Facility: OTHER | Age: 36
End: 2021-05-03
Payer: COMMERCIAL

## 2021-05-03 DIAGNOSIS — K64.9 HEMORRHOIDS, UNSPECIFIED HEMORRHOID TYPE: ICD-10-CM

## 2021-05-03 DIAGNOSIS — Z98.891 S/P CESAREAN SECTION: ICD-10-CM

## 2021-05-03 PROCEDURE — 97162 PT EVAL MOD COMPLEX 30 MIN: CPT

## 2021-05-03 PROCEDURE — 97530 THERAPEUTIC ACTIVITIES: CPT

## 2021-05-04 ENCOUNTER — CLINICAL SUPPORT (OUTPATIENT)
Dept: REHABILITATION | Facility: OTHER | Age: 36
End: 2021-05-04
Payer: COMMERCIAL

## 2021-05-04 DIAGNOSIS — F41.8 POSTPARTUM ANXIETY: Primary | ICD-10-CM

## 2021-05-04 PROCEDURE — 97140 MANUAL THERAPY 1/> REGIONS: CPT | Mod: PN

## 2021-05-04 PROCEDURE — 97112 NEUROMUSCULAR REEDUCATION: CPT | Mod: PN

## 2021-05-04 PROCEDURE — 97110 THERAPEUTIC EXERCISES: CPT | Mod: PN

## 2021-05-09 ENCOUNTER — PATIENT MESSAGE (OUTPATIENT)
Dept: OBSTETRICS AND GYNECOLOGY | Facility: CLINIC | Age: 36
End: 2021-05-09

## 2021-05-12 ENCOUNTER — TELEPHONE (OUTPATIENT)
Dept: ADMINISTRATIVE | Facility: OTHER | Age: 36
End: 2021-05-12

## 2021-06-04 PROBLEM — O41.1290 CHORIOAMNIONITIS, DELIVERED, CURRENT HOSPITALIZATION: Status: RESOLVED | Noted: 2021-01-28 | Resolved: 2021-06-04

## 2021-06-04 PROBLEM — O22.43 HEMORRHOIDS DURING PREGNANCY IN THIRD TRIMESTER: Status: RESOLVED | Noted: 2021-01-26 | Resolved: 2021-06-04

## 2021-06-04 PROBLEM — O42.90 PROLONGED RUPTURE OF MEMBRANES: Status: RESOLVED | Noted: 2021-01-27 | Resolved: 2021-06-04

## 2021-06-04 PROBLEM — D69.6 GESTATIONAL THROMBOCYTOPENIA WITHOUT HEMORRHAGE IN THIRD TRIMESTER: Status: RESOLVED | Noted: 2021-01-02 | Resolved: 2021-06-04

## 2021-06-04 PROBLEM — O09.529 ANTEPARTUM MULTIGRAVIDA OF ADVANCED MATERNAL AGE: Status: RESOLVED | Noted: 2020-10-14 | Resolved: 2021-06-04

## 2021-06-04 PROBLEM — Z34.90 PREGNANCY WITH ONE FETUS, ANTEPARTUM: Status: RESOLVED | Noted: 2020-08-25 | Resolved: 2021-06-04

## 2021-06-04 PROBLEM — O99.113 GESTATIONAL THROMBOCYTOPENIA WITHOUT HEMORRHAGE IN THIRD TRIMESTER: Status: RESOLVED | Noted: 2021-01-02 | Resolved: 2021-06-04

## 2021-06-04 PROBLEM — O42.90 PROM (PREMATURE RUPTURE OF MEMBRANES): Status: RESOLVED | Noted: 2021-01-26 | Resolved: 2021-06-04

## 2021-07-12 ENCOUNTER — TELEPHONE (OUTPATIENT)
Dept: OBSTETRICS AND GYNECOLOGY | Facility: CLINIC | Age: 36
End: 2021-07-12

## 2021-07-12 ENCOUNTER — PATIENT MESSAGE (OUTPATIENT)
Dept: OBSTETRICS AND GYNECOLOGY | Facility: CLINIC | Age: 36
End: 2021-07-12

## 2021-08-25 ENCOUNTER — CLINICAL SUPPORT (OUTPATIENT)
Dept: REHABILITATION | Facility: OTHER | Age: 36
End: 2021-08-25
Attending: ADVANCED PRACTICE MIDWIFE
Payer: COMMERCIAL

## 2021-08-25 DIAGNOSIS — M62.81 MUSCLE WEAKNESS: ICD-10-CM

## 2021-08-25 DIAGNOSIS — M62.89 PELVIC FLOOR DYSFUNCTION: ICD-10-CM

## 2021-08-25 DIAGNOSIS — R27.8 OTHER LACK OF COORDINATION: ICD-10-CM

## 2021-08-25 PROCEDURE — 97530 THERAPEUTIC ACTIVITIES: CPT

## 2021-08-25 PROCEDURE — 97112 NEUROMUSCULAR REEDUCATION: CPT

## 2021-09-07 PROBLEM — M62.89 PELVIC FLOOR DYSFUNCTION: Status: ACTIVE | Noted: 2021-09-07

## 2021-09-07 PROBLEM — R27.8 OTHER LACK OF COORDINATION: Status: ACTIVE | Noted: 2021-09-07

## 2021-09-07 PROBLEM — M62.81 MUSCLE WEAKNESS: Status: ACTIVE | Noted: 2021-09-07

## 2021-12-13 ENCOUNTER — PATIENT MESSAGE (OUTPATIENT)
Dept: OBSTETRICS AND GYNECOLOGY | Facility: CLINIC | Age: 36
End: 2021-12-13
Payer: COMMERCIAL

## 2022-02-19 ENCOUNTER — PATIENT MESSAGE (OUTPATIENT)
Dept: OBSTETRICS AND GYNECOLOGY | Facility: CLINIC | Age: 37
End: 2022-02-19
Payer: COMMERCIAL

## 2022-02-22 RX ORDER — PROGESTERONE 200 MG/1
200 CAPSULE ORAL NIGHTLY
Qty: 12 CAPSULE | Refills: 11 | Status: CANCELLED | OUTPATIENT
Start: 2022-02-22 | End: 2023-02-22

## 2022-02-22 RX ORDER — PROGESTERONE 200 MG/1
200 CAPSULE ORAL NIGHTLY
Qty: 30 CAPSULE | Refills: 11 | Status: SHIPPED | OUTPATIENT
Start: 2022-02-22 | End: 2022-02-22

## 2022-03-10 ENCOUNTER — PATIENT MESSAGE (OUTPATIENT)
Dept: OBSTETRICS AND GYNECOLOGY | Facility: CLINIC | Age: 37
End: 2022-03-10

## 2022-03-10 ENCOUNTER — OFFICE VISIT (OUTPATIENT)
Dept: OBSTETRICS AND GYNECOLOGY | Facility: CLINIC | Age: 37
End: 2022-03-10
Payer: COMMERCIAL

## 2022-03-10 VITALS
BODY MASS INDEX: 18.26 KG/M2 | WEIGHT: 106.94 LBS | DIASTOLIC BLOOD PRESSURE: 68 MMHG | HEIGHT: 64 IN | SYSTOLIC BLOOD PRESSURE: 110 MMHG

## 2022-03-10 DIAGNOSIS — Z01.419 WOMEN'S ANNUAL ROUTINE GYNECOLOGICAL EXAMINATION: Primary | ICD-10-CM

## 2022-03-10 PROCEDURE — 3078F PR MOST RECENT DIASTOLIC BLOOD PRESSURE < 80 MM HG: ICD-10-PCS | Mod: CPTII,S$GLB,, | Performed by: ADVANCED PRACTICE MIDWIFE

## 2022-03-10 PROCEDURE — 88175 CYTOPATH C/V AUTO FLUID REDO: CPT | Performed by: ADVANCED PRACTICE MIDWIFE

## 2022-03-10 PROCEDURE — 99999 PR PBB SHADOW E&M-EST. PATIENT-LVL III: CPT | Mod: PBBFAC,,, | Performed by: ADVANCED PRACTICE MIDWIFE

## 2022-03-10 PROCEDURE — 99999 PR PBB SHADOW E&M-EST. PATIENT-LVL III: ICD-10-PCS | Mod: PBBFAC,,, | Performed by: ADVANCED PRACTICE MIDWIFE

## 2022-03-10 PROCEDURE — 1159F PR MEDICATION LIST DOCUMENTED IN MEDICAL RECORD: ICD-10-PCS | Mod: CPTII,S$GLB,, | Performed by: ADVANCED PRACTICE MIDWIFE

## 2022-03-10 PROCEDURE — 3074F PR MOST RECENT SYSTOLIC BLOOD PRESSURE < 130 MM HG: ICD-10-PCS | Mod: CPTII,S$GLB,, | Performed by: ADVANCED PRACTICE MIDWIFE

## 2022-03-10 PROCEDURE — 99395 PR PREVENTIVE VISIT,EST,18-39: ICD-10-PCS | Mod: S$GLB,,, | Performed by: ADVANCED PRACTICE MIDWIFE

## 2022-03-10 PROCEDURE — 3074F SYST BP LT 130 MM HG: CPT | Mod: CPTII,S$GLB,, | Performed by: ADVANCED PRACTICE MIDWIFE

## 2022-03-10 PROCEDURE — 99395 PREV VISIT EST AGE 18-39: CPT | Mod: S$GLB,,, | Performed by: ADVANCED PRACTICE MIDWIFE

## 2022-03-10 PROCEDURE — 1159F MED LIST DOCD IN RCRD: CPT | Mod: CPTII,S$GLB,, | Performed by: ADVANCED PRACTICE MIDWIFE

## 2022-03-10 PROCEDURE — 3008F BODY MASS INDEX DOCD: CPT | Mod: CPTII,S$GLB,, | Performed by: ADVANCED PRACTICE MIDWIFE

## 2022-03-10 PROCEDURE — 3008F PR BODY MASS INDEX (BMI) DOCUMENTED: ICD-10-PCS | Mod: CPTII,S$GLB,, | Performed by: ADVANCED PRACTICE MIDWIFE

## 2022-03-10 PROCEDURE — 87624 HPV HI-RISK TYP POOLED RSLT: CPT | Performed by: ADVANCED PRACTICE MIDWIFE

## 2022-03-10 PROCEDURE — 3078F DIAST BP <80 MM HG: CPT | Mod: CPTII,S$GLB,, | Performed by: ADVANCED PRACTICE MIDWIFE

## 2022-03-10 RX ORDER — LORAZEPAM 0.5 MG/1
0.5 TABLET ORAL DAILY PRN
COMMUNITY
Start: 2022-03-03

## 2022-03-10 RX ORDER — SERTRALINE HYDROCHLORIDE 25 MG/1
25 TABLET, FILM COATED ORAL DAILY
COMMUNITY
Start: 2021-09-27 | End: 2022-08-17

## 2022-03-10 RX ORDER — ASCORBIC ACID, CHOLECALCIFEROL, .ALPHA.-TOCOPHEROL ACETATE, DL-, PYRIDOXINE, FOLIC ACID, CYANOCOBALAMIN, CALCIUM, FERROUS FUMARATE, MAGNESIUM, DOCONEXENT 85; 200; 10; 25; 1; 12; 140; 27; 45; 300 [IU]/1; [IU]/1; [IU]/1; [IU]/1; MG/1; UG/1; MG/1; MG/1; MG/1; MG/1
1 CAPSULE, GELATIN COATED ORAL DAILY
Qty: 90 CAPSULE | Refills: 3 | Status: SHIPPED | OUTPATIENT
Start: 2022-03-10 | End: 2022-08-10

## 2022-03-10 RX ORDER — PROGESTERONE 100 MG/1
300 CAPSULE ORAL DAILY
COMMUNITY
End: 2022-08-22

## 2022-03-10 RX ORDER — ESCITALOPRAM OXALATE 5 MG/1
5 TABLET ORAL DAILY
COMMUNITY
End: 2022-08-17 | Stop reason: SDUPTHER

## 2022-03-10 NOTE — PROGRESS NOTES
Loren Dickerson is a 37 y.o.  who presents today for her annual GYN exam.    C/C: annual GYN well woman preventative exam, using condoms, stopped nursing and had some mood changes so started back on low dose lexapro  -wants to have abdomen checked for possible hernia--small protrusion, denies pain.  HPI: She is not currently sexually active and uses condoms for contraception. denies dyspareunia.   Has no GYN related concerns. decilnes STD testing. Reports no long term chronic medical conditions     MENSTRUAL HISTORY  Patient's last menstrual period was 2022 (approximate).. She reports regular menses occurring every 28 days.  Menses last 5-7 days with moderate flow.  She denies dysmenorrhea.  She denies intermenstrual bleeding.  +PMS symtpoms, taking lexapro and progesterone (previously prescribed by DR. Torres and worked well, she admits she hasn't started)  PAP HISTORY: last pap 2018-NILM w/out HPV, denies any history of abnormal pap smear or STDs.     IMMUNIZATIONS: Had Gardisil    SOCIAL HISTORY: Denies emotional/mental/physical/sexual violence or abuse. Feels safe at home.    Review of patient's allergies indicates:   Allergen Reactions    Yellow dye Other (See Comments)     GI upset     Past Medical History:   Diagnosis Date    Migraines 2020    Only during the beginning of pregnancy    PROM (premature rupture of membranes) 2021     Past Surgical History:   Procedure Laterality Date     SECTION N/A 2021    Procedure:  SECTION;  Surgeon: Jacquelyn Jesus MD;  Location: [facility] L&D;  Service: OB/GYN;  Laterality: N/A;     SECTION      WISDOM TOOTH EXTRACTION      No complications     Past Surgical History:   Procedure Laterality Date     SECTION N/A 2021    Procedure:  SECTION;  Surgeon: Jacquelyn Jesus MD;  Location: [facility] L&D;  Service: OB/GYN;  Laterality: N/A;     SECTION      WISDOM TOOTH EXTRACTION       No complications     OB History    Para Term  AB Living   2 1 1   1 1   SAB IAB Ectopic Multiple Live Births     1   0 1      # Outcome Date GA Lbr Rayo/2nd Weight Sex Delivery Anes PTL Lv   2 Term 21 41w3d  3.56 kg (7 lb 13.6 oz) F [id number]-[id number] EPI N JAMIE      Complications: Chorioamnionitis   1 IAB 07/12/10 6w0d             Obstetric Comments   Menarche age 12     OB History        2    Para   1    Term   1            AB   1    Living   1       SAB        IAB   1    Ectopic        Multiple   0    Live Births   1           Obstetric Comments   Menarche age 12           Social History     Socioeconomic History    Marital status:      Spouse name: Ryan    Number of children: 0    Years of education: 19    Highest education level: Master's degree (e.g., MA, MS, [place], MEd, MSW, BEATRICE)   Occupational History    Occupation: [employer],      Employer: [employer]   Tobacco Use    Smoking status: Never Smoker    Smokeless tobacco: Never Used   Substance and Sexual Activity    Alcohol use: Not Currently     Comment: social/ not since pregnancy    Drug use: Yes     Types: Marijuana     Comment: past, none during pregnancy    Sexual activity: Yes     Partners: Male     Birth control/protection: None     Comment: Ryan   Social History Narrative    Just moved , still moving. Renovating.    Ryan- nurse @ [employer], ER        No cats, some gardening (will wear gloves).     Family History   Problem Relation Age of Onset    No Known Problems Father     Fibromyalgia Mother     Hypertension Mother     No Known Problems Sister     Emphysema Maternal Grandmother     No Known Problems Maternal Grandfather     No Known Problems Paternal Grandmother     Emphysema Paternal Grandfather     No Known Problems Sister         both children were   32wks, 35wks    Breast cancer Neg Hx     Colon cancer Neg Hx     Ovarian cancer Neg Hx      Social History  "    Substance and Sexual Activity   Sexual Activity Yes    Partners: Male    Birth control/protection: None    Comment: MD MOMO Ross  Constitutional/Gen: Negative--fever, weight change, fatigue   Skin:  Negative--Hirsutism, acne, rash  CV/vasc: Negative--chest pain, palpitations, syncope  Resp:  Negative--Cough, shortness of breath, wheezing  GI:  Negative--Nausea, vomiting, diarrhea, constipation, abdominal pain  :  Negative--Dysuria, vaginal discharge, genital sores, dyspareunia   Lymph:  Negative--Swollen glands, frequent infection  Heme:  Negative--Easy bruising or bleeding, clot  Breast: Negative--Mass, lump, nipple discharge, tenderness  MS:  Negative--Back/joint pain, muscle weakness, difficulty walking  Neuro: Negative--Numbness, tingling, confusion, headaches, seizures, dizziness  Psych: Negative--Depressed mood, anxiety, insomnia  Endocrine:  Negative--Polydipsia, polyuria, heat or cold intolerance    OBJECTIVE:  /68   Ht 5' 4.02" (1.626 m)   Wt 48.5 kg (106 lb 14.8 oz)   LMP 02/18/2022 (Approximate)   Breastfeeding Yes   BMI 18.34 kg/m²   Gen:  NAD, appears stated age, well groomed  Skin: warm and dry w/o rash  Head: normocephalic  Mouth: no caries  Neck: supple, no masses or enlargement  Lung: normal resp effort, CTAB  Heart: normal HR, RRR   Back: negative CVAT  Breast: bilaterally--no masses, tenderness, skin changes, or nipple discharge noted  Abdomen: soft, nontender, no masses, and bowel sounds normal. Slight diastasis Recti.   External genitalia: no lesions or discharge, normal hair distribution  Urethral meatus: normal size and location, no lesions or prolapse  Vagina: normal appearance, no lesions, no discharge  Cervix: normal appearance, no discharge, no lesions, negative CMT  Uterus: nontender, mobile, normal size, contour, and position.   Adnexa: no masses or tenderness  Anus: normal appearance, with no lesions or discharge. Internal exam " deferred.  Extremities: FROM, with no edema or tenderness.  Neurologic: A&O x 4, non-focal, cranial nerves 2-12 grossly intact  Psych:  appropriate affect and no signs of mood, thought or memory difficulty appreciated      ASSESSMENT/PLAN:   37 y.o. female  for GYN annual well woman exam  -- Discussed ASCCP pap guidelines. Pap today with HPV  -- Declines STD testing  -- Health Teaching:  Discussed appropriate weight and nutrition. Emphasized importance of calcium and vitamin D intake. Encouraged exercise, 30-40 min 3x a week or more. BSE and health maintenance reviewed. Discussed following PCP for annual health screening. Discussed BMI and normal ranges.   --Labs ordered  > 19yo: lipids with glucose  > 64yo: BMD,   > 51yo: colonoscopy, fecal occult blood  > 41yo: mammogram  > 44yo: TSH  Mammograms--annually at 40  --Continue annual exams, return in 1 year or sooner prn    Contraceptive counseling, status, surveillance  -- advised--declines. Advised daily prenatal vitamin and requests Rx--sent.      Updated Medication List:  Current Outpatient Medications   Medication Sig Dispense Refill    b complex vitamins tablet Take 1 tablet by mouth once daily.      EScitalopram oxalate (LEXAPRO) 5 MG Tab Take 5 mg by mouth once daily.      Lactobac no.41/Bifidobact no.7 (PROBIOTIC-10 ORAL)       omega-3 fatty acids/fish oil (FISH OIL-OMEGA-3 FATTY ACIDS) 300-1,000 mg capsule Take by mouth once daily.      progesterone (PROMETRIUM) 100 MG capsule Take 300 mg by mouth once daily.       No current facility-administered medications for this visit.        Loren Fraser CNM, MSN  03/10/2022  2:59 PM

## 2022-03-16 LAB
HPV HR 12 DNA SPEC QL NAA+PROBE: NEGATIVE
HPV16 AG SPEC QL: NEGATIVE
HPV18 DNA SPEC QL NAA+PROBE: NEGATIVE

## 2022-03-17 LAB
FINAL PATHOLOGIC DIAGNOSIS: NORMAL
Lab: NORMAL

## 2022-05-03 ENCOUNTER — PATIENT MESSAGE (OUTPATIENT)
Dept: OBSTETRICS AND GYNECOLOGY | Facility: CLINIC | Age: 37
End: 2022-05-03
Payer: COMMERCIAL

## 2022-06-02 ENCOUNTER — PATIENT MESSAGE (OUTPATIENT)
Dept: OBSTETRICS AND GYNECOLOGY | Facility: CLINIC | Age: 37
End: 2022-06-02
Payer: COMMERCIAL

## 2022-07-28 ENCOUNTER — TELEPHONE (OUTPATIENT)
Dept: FAMILY MEDICINE | Facility: CLINIC | Age: 37
End: 2022-07-28
Payer: COMMERCIAL

## 2022-07-28 ENCOUNTER — PATIENT MESSAGE (OUTPATIENT)
Dept: FAMILY MEDICINE | Facility: CLINIC | Age: 37
End: 2022-07-28
Payer: COMMERCIAL

## 2022-08-17 ENCOUNTER — OFFICE VISIT (OUTPATIENT)
Dept: FAMILY MEDICINE | Facility: CLINIC | Age: 37
End: 2022-08-17
Attending: FAMILY MEDICINE
Payer: COMMERCIAL

## 2022-08-17 VITALS
BODY MASS INDEX: 18.78 KG/M2 | DIASTOLIC BLOOD PRESSURE: 64 MMHG | HEIGHT: 64 IN | WEIGHT: 110 LBS | SYSTOLIC BLOOD PRESSURE: 92 MMHG | HEART RATE: 77 BPM | OXYGEN SATURATION: 95 %

## 2022-08-17 DIAGNOSIS — F41.8 POSTPARTUM ANXIETY: ICD-10-CM

## 2022-08-17 DIAGNOSIS — R52 PAIN: Primary | ICD-10-CM

## 2022-08-17 DIAGNOSIS — M25.511 CHRONIC RIGHT SHOULDER PAIN: ICD-10-CM

## 2022-08-17 DIAGNOSIS — G89.29 CHRONIC RIGHT SHOULDER PAIN: ICD-10-CM

## 2022-08-17 DIAGNOSIS — Z00.00 ANNUAL PHYSICAL EXAM: Primary | ICD-10-CM

## 2022-08-17 LAB
BACTERIA #/AREA URNS AUTO: ABNORMAL /HPF
BILIRUB UR QL STRIP: NEGATIVE
CLARITY UR REFRACT.AUTO: ABNORMAL
COLOR UR AUTO: YELLOW
GLUCOSE UR QL STRIP: NEGATIVE
HGB UR QL STRIP: NEGATIVE
KETONES UR QL STRIP: NEGATIVE
LEUKOCYTE ESTERASE UR QL STRIP: ABNORMAL
MICROSCOPIC COMMENT: ABNORMAL
NITRITE UR QL STRIP: NEGATIVE
PH UR STRIP: 6 [PH] (ref 5–8)
PROT UR QL STRIP: NEGATIVE
RBC #/AREA URNS AUTO: 2 /HPF (ref 0–4)
SP GR UR STRIP: 1.01 (ref 1–1.03)
SQUAMOUS #/AREA URNS AUTO: 23 /HPF
URN SPEC COLLECT METH UR: ABNORMAL
WBC #/AREA URNS AUTO: 17 /HPF (ref 0–5)

## 2022-08-17 PROCEDURE — 1160F PR REVIEW ALL MEDS BY PRESCRIBER/CLIN PHARMACIST DOCUMENTED: ICD-10-PCS | Mod: CPTII,S$GLB,, | Performed by: FAMILY MEDICINE

## 2022-08-17 PROCEDURE — 81001 URINALYSIS AUTO W/SCOPE: CPT | Performed by: FAMILY MEDICINE

## 2022-08-17 PROCEDURE — 99395 PREV VISIT EST AGE 18-39: CPT | Mod: S$GLB,,, | Performed by: FAMILY MEDICINE

## 2022-08-17 PROCEDURE — 3078F DIAST BP <80 MM HG: CPT | Mod: CPTII,S$GLB,, | Performed by: FAMILY MEDICINE

## 2022-08-17 PROCEDURE — 1159F MED LIST DOCD IN RCRD: CPT | Mod: CPTII,S$GLB,, | Performed by: FAMILY MEDICINE

## 2022-08-17 PROCEDURE — 3008F PR BODY MASS INDEX (BMI) DOCUMENTED: ICD-10-PCS | Mod: CPTII,S$GLB,, | Performed by: FAMILY MEDICINE

## 2022-08-17 PROCEDURE — 1159F PR MEDICATION LIST DOCUMENTED IN MEDICAL RECORD: ICD-10-PCS | Mod: CPTII,S$GLB,, | Performed by: FAMILY MEDICINE

## 2022-08-17 PROCEDURE — 99999 PR PBB SHADOW E&M-EST. PATIENT-LVL IV: CPT | Mod: PBBFAC,,, | Performed by: FAMILY MEDICINE

## 2022-08-17 PROCEDURE — 99999 PR PBB SHADOW E&M-EST. PATIENT-LVL IV: ICD-10-PCS | Mod: PBBFAC,,, | Performed by: FAMILY MEDICINE

## 2022-08-17 PROCEDURE — 1160F RVW MEDS BY RX/DR IN RCRD: CPT | Mod: CPTII,S$GLB,, | Performed by: FAMILY MEDICINE

## 2022-08-17 PROCEDURE — 3078F PR MOST RECENT DIASTOLIC BLOOD PRESSURE < 80 MM HG: ICD-10-PCS | Mod: CPTII,S$GLB,, | Performed by: FAMILY MEDICINE

## 2022-08-17 PROCEDURE — 3008F BODY MASS INDEX DOCD: CPT | Mod: CPTII,S$GLB,, | Performed by: FAMILY MEDICINE

## 2022-08-17 PROCEDURE — 3074F PR MOST RECENT SYSTOLIC BLOOD PRESSURE < 130 MM HG: ICD-10-PCS | Mod: CPTII,S$GLB,, | Performed by: FAMILY MEDICINE

## 2022-08-17 PROCEDURE — 3074F SYST BP LT 130 MM HG: CPT | Mod: CPTII,S$GLB,, | Performed by: FAMILY MEDICINE

## 2022-08-17 PROCEDURE — 99395 PR PREVENTIVE VISIT,EST,18-39: ICD-10-PCS | Mod: S$GLB,,, | Performed by: FAMILY MEDICINE

## 2022-08-17 RX ORDER — ESCITALOPRAM OXALATE 10 MG/1
10 TABLET ORAL DAILY
Qty: 90 TABLET | Refills: 1 | Status: SHIPPED | OUTPATIENT
Start: 2022-08-17 | End: 2023-05-10

## 2022-08-19 ENCOUNTER — PATIENT MESSAGE (OUTPATIENT)
Dept: ORTHOPEDICS | Facility: CLINIC | Age: 37
End: 2022-08-19
Payer: COMMERCIAL

## 2022-08-19 ENCOUNTER — TELEPHONE (OUTPATIENT)
Dept: ORTHOPEDICS | Facility: CLINIC | Age: 37
End: 2022-08-19
Payer: COMMERCIAL

## 2022-08-22 NOTE — PROGRESS NOTES
"Subjective:       Patient ID: Loren Dickerson is a 37 y.o. female.    Chief Complaint: Establish Care    HPI   Pt is here for annual exam pt is well no sob/cp no change in bowel habits no brbpr  Pt denies dysuria hematuria no abnl vaginal bleeding pt has post partum anxiety she denies panic attacks no si/hi   Pt denies n/v/f/c/d/c no cough chest congestion no sore throat  Pt has right shoulder pain no acute event takes nothing on a schedule several weeks attributes this to holding her child   Review of Systems   Constitutional: Negative for activity change, chills, diaphoresis, fatigue and fever.   HENT: Negative for congestion, ear discharge, ear pain, hearing loss, postnasal drip, rhinorrhea, sinus pressure, sneezing, sore throat, trouble swallowing and voice change.    Eyes: Negative for photophobia, discharge, redness, itching and visual disturbance.   Respiratory: Negative for cough, chest tightness, shortness of breath and wheezing.    Cardiovascular: Negative for chest pain, palpitations and leg swelling.   Gastrointestinal: Negative for abdominal pain, anal bleeding, blood in stool, constipation, diarrhea, nausea, rectal pain and vomiting.   Genitourinary: Negative for dyspareunia, dysuria, flank pain, frequency, hematuria, menstrual problem, pelvic pain, urgency, vaginal bleeding and vaginal discharge.   Musculoskeletal: Positive for arthralgias. Negative for back pain, joint swelling and neck pain.   Skin: Negative for color change and rash.   Neurological: Negative for dizziness, speech difficulty, weakness, light-headedness, numbness and headaches.   Hematological: Does not bruise/bleed easily.   Psychiatric/Behavioral: Negative for agitation, confusion, decreased concentration, sleep disturbance and suicidal ideas. The patient is nervous/anxious.        Objective:     BP 92/64   Pulse 77   Ht 5' 4" (1.626 m)   Wt 49.9 kg (110 lb)   LMP 08/07/2022   SpO2 95%   BMI 18.88 kg/m²     Physical " Exam  Constitutional:       Appearance: Normal appearance. She is well-developed. She is not ill-appearing.   HENT:      Head: Normocephalic and atraumatic.      Right Ear: Tympanic membrane and external ear normal.      Left Ear: Tympanic membrane and external ear normal.      Nose: Nose normal.   Eyes:      General:         Right eye: No discharge.         Left eye: No discharge.      Conjunctiva/sclera: Conjunctivae normal.      Pupils: Pupils are equal, round, and reactive to light.   Neck:      Thyroid: No thyromegaly.   Cardiovascular:      Rate and Rhythm: Normal rate and regular rhythm.      Heart sounds: Normal heart sounds. No murmur heard.    No friction rub. No gallop.   Pulmonary:      Effort: Pulmonary effort is normal.      Breath sounds: Normal breath sounds. No wheezing or rales.   Abdominal:      General: Bowel sounds are normal. There is no distension.      Palpations: Abdomen is soft.      Tenderness: There is no abdominal tenderness. There is no guarding or rebound.   Genitourinary:     Vagina: Normal.      Comments: declined  Musculoskeletal:         General: No tenderness. Normal range of motion.      Cervical back: Normal range of motion and neck supple.   Lymphadenopathy:      Cervical: No cervical adenopathy.   Skin:     General: Skin is warm and dry.      Findings: No erythema or rash.   Neurological:      General: No focal deficit present.      Mental Status: She is alert and oriented to person, place, and time.      Cranial Nerves: No cranial nerve deficit.      Motor: No abnormal muscle tone.      Coordination: Coordination normal.   Psychiatric:         Behavior: Behavior normal.         Thought Content: Thought content normal.         Judgment: Judgment normal.         Assessment:       1. Annual physical exam    2. Postpartum anxiety    3. Chronic right shoulder pain        Plan:     orders cbc cmp lipid tsh urine  Cont meds  Start nsaids otc on a schedule with food x 2 weeks apply  "ice  Start shoulder pt exercises  rtc annually and 3-6 months anxiety f/u    Health maintenance  Lipid ordered  Flu in fall  Tetanus q 10 years  Pap with gyn  covid discussed       "This note will not be shared with the patient."     "

## 2022-08-31 ENCOUNTER — TELEPHONE (OUTPATIENT)
Dept: OBSTETRICS AND GYNECOLOGY | Facility: CLINIC | Age: 37
End: 2022-08-31
Payer: COMMERCIAL

## 2022-08-31 NOTE — TELEPHONE ENCOUNTER
----- Message from Arlyn Thurston sent at 8/30/2022  4:09 PM CDT -----   Name of Who is Calling:     What is the request in detail:  patient request call back in reference to reschedule appointment Please contact to further discuss and advise      Can the clinic reply by [facility]:     [facility]  [phone]

## 2022-09-06 ENCOUNTER — PATIENT MESSAGE (OUTPATIENT)
Dept: ORTHOPEDICS | Facility: CLINIC | Age: 37
End: 2022-09-06
Payer: COMMERCIAL

## 2022-09-19 ENCOUNTER — PATIENT MESSAGE (OUTPATIENT)
Dept: ORTHOPEDICS | Facility: CLINIC | Age: 37
End: 2022-09-19
Payer: COMMERCIAL

## 2022-10-07 ENCOUNTER — PATIENT MESSAGE (OUTPATIENT)
Dept: OBSTETRICS AND GYNECOLOGY | Facility: CLINIC | Age: 37
End: 2022-10-07
Payer: COMMERCIAL

## 2022-10-17 ENCOUNTER — LAB VISIT (OUTPATIENT)
Dept: LAB | Facility: HOSPITAL | Age: 37
End: 2022-10-17
Attending: STUDENT IN AN ORGANIZED HEALTH CARE EDUCATION/TRAINING PROGRAM
Payer: COMMERCIAL

## 2022-10-17 ENCOUNTER — OFFICE VISIT (OUTPATIENT)
Dept: GASTROENTEROLOGY | Facility: CLINIC | Age: 37
End: 2022-10-17
Payer: COMMERCIAL

## 2022-10-17 VITALS
HEIGHT: 64 IN | DIASTOLIC BLOOD PRESSURE: 85 MMHG | WEIGHT: 110.31 LBS | BODY MASS INDEX: 18.83 KG/M2 | HEART RATE: 73 BPM | SYSTOLIC BLOOD PRESSURE: 119 MMHG

## 2022-10-17 DIAGNOSIS — R11.2 NAUSEA AND VOMITING, UNSPECIFIED VOMITING TYPE: ICD-10-CM

## 2022-10-17 DIAGNOSIS — R74.8 ELEVATED LIVER ENZYMES: Primary | ICD-10-CM

## 2022-10-17 DIAGNOSIS — R10.9 ABDOMINAL PAIN, UNSPECIFIED ABDOMINAL LOCATION: ICD-10-CM

## 2022-10-17 DIAGNOSIS — R10.9 ABDOMINAL PAIN, UNSPECIFIED ABDOMINAL LOCATION: Primary | ICD-10-CM

## 2022-10-17 LAB
ALBUMIN SERPL BCP-MCNC: 4 G/DL (ref 3.5–5.2)
ALP SERPL-CCNC: 73 U/L (ref 55–135)
ALT SERPL W/O P-5'-P-CCNC: 23 U/L (ref 10–44)
ANION GAP SERPL CALC-SCNC: 6 MMOL/L (ref 8–16)
AST SERPL-CCNC: 57 U/L (ref 10–40)
BASOPHILS # BLD AUTO: 0.03 K/UL (ref 0–0.2)
BASOPHILS NFR BLD: 0.9 % (ref 0–1.9)
BILIRUB SERPL-MCNC: 0.8 MG/DL (ref 0.1–1)
BUN SERPL-MCNC: 10 MG/DL (ref 6–20)
CALCIUM SERPL-MCNC: 9.6 MG/DL (ref 8.7–10.5)
CHLORIDE SERPL-SCNC: 105 MMOL/L (ref 95–110)
CO2 SERPL-SCNC: 28 MMOL/L (ref 23–29)
CREAT SERPL-MCNC: 0.7 MG/DL (ref 0.5–1.4)
CRP SERPL-MCNC: 0.6 MG/L (ref 0–8.2)
DIFFERENTIAL METHOD: ABNORMAL
EOSINOPHIL # BLD AUTO: 0.2 K/UL (ref 0–0.5)
EOSINOPHIL NFR BLD: 4.6 % (ref 0–8)
ERYTHROCYTE [DISTWIDTH] IN BLOOD BY AUTOMATED COUNT: 12 % (ref 11.5–14.5)
EST. GFR  (NO RACE VARIABLE): >60 ML/MIN/1.73 M^2
FERRITIN SERPL-MCNC: 89 NG/ML (ref 20–300)
GLUCOSE SERPL-MCNC: 79 MG/DL (ref 70–110)
HCT VFR BLD AUTO: 43 % (ref 37–48.5)
HGB BLD-MCNC: 13.8 G/DL (ref 12–16)
IMM GRANULOCYTES # BLD AUTO: 0 K/UL (ref 0–0.04)
IMM GRANULOCYTES NFR BLD AUTO: 0 % (ref 0–0.5)
IRON SERPL-MCNC: 196 UG/DL (ref 30–160)
LYMPHOCYTES # BLD AUTO: 1.4 K/UL (ref 1–4.8)
LYMPHOCYTES NFR BLD: 40.2 % (ref 18–48)
MCH RBC QN AUTO: 30.8 PG (ref 27–31)
MCHC RBC AUTO-ENTMCNC: 32.1 G/DL (ref 32–36)
MCV RBC AUTO: 96 FL (ref 82–98)
MONOCYTES # BLD AUTO: 0.4 K/UL (ref 0.3–1)
MONOCYTES NFR BLD: 10.5 % (ref 4–15)
NEUTROPHILS # BLD AUTO: 1.5 K/UL (ref 1.8–7.7)
NEUTROPHILS NFR BLD: 43.8 % (ref 38–73)
NRBC BLD-RTO: 0 /100 WBC
PLATELET # BLD AUTO: 187 K/UL (ref 150–450)
PMV BLD AUTO: 11.5 FL (ref 9.2–12.9)
POTASSIUM SERPL-SCNC: 4.7 MMOL/L (ref 3.5–5.1)
PROT SERPL-MCNC: 7.1 G/DL (ref 6–8.4)
RBC # BLD AUTO: 4.48 M/UL (ref 4–5.4)
SATURATED IRON: 53 % (ref 20–50)
SODIUM SERPL-SCNC: 139 MMOL/L (ref 136–145)
TOTAL IRON BINDING CAPACITY: 373 UG/DL (ref 250–450)
TRANSFERRIN SERPL-MCNC: 252 MG/DL (ref 200–375)
WBC # BLD AUTO: 3.51 K/UL (ref 3.9–12.7)

## 2022-10-17 PROCEDURE — 84466 ASSAY OF TRANSFERRIN: CPT | Performed by: STUDENT IN AN ORGANIZED HEALTH CARE EDUCATION/TRAINING PROGRAM

## 2022-10-17 PROCEDURE — 1159F PR MEDICATION LIST DOCUMENTED IN MEDICAL RECORD: ICD-10-PCS | Mod: CPTII,S$GLB,, | Performed by: STUDENT IN AN ORGANIZED HEALTH CARE EDUCATION/TRAINING PROGRAM

## 2022-10-17 PROCEDURE — 3074F PR MOST RECENT SYSTOLIC BLOOD PRESSURE < 130 MM HG: ICD-10-PCS | Mod: CPTII,S$GLB,, | Performed by: STUDENT IN AN ORGANIZED HEALTH CARE EDUCATION/TRAINING PROGRAM

## 2022-10-17 PROCEDURE — 3074F SYST BP LT 130 MM HG: CPT | Mod: CPTII,S$GLB,, | Performed by: STUDENT IN AN ORGANIZED HEALTH CARE EDUCATION/TRAINING PROGRAM

## 2022-10-17 PROCEDURE — 82728 ASSAY OF FERRITIN: CPT | Performed by: STUDENT IN AN ORGANIZED HEALTH CARE EDUCATION/TRAINING PROGRAM

## 2022-10-17 PROCEDURE — 80053 COMPREHEN METABOLIC PANEL: CPT | Performed by: STUDENT IN AN ORGANIZED HEALTH CARE EDUCATION/TRAINING PROGRAM

## 2022-10-17 PROCEDURE — 99999 PR PBB SHADOW E&M-EST. PATIENT-LVL III: CPT | Mod: PBBFAC,,, | Performed by: STUDENT IN AN ORGANIZED HEALTH CARE EDUCATION/TRAINING PROGRAM

## 2022-10-17 PROCEDURE — 1159F MED LIST DOCD IN RCRD: CPT | Mod: CPTII,S$GLB,, | Performed by: STUDENT IN AN ORGANIZED HEALTH CARE EDUCATION/TRAINING PROGRAM

## 2022-10-17 PROCEDURE — 99204 PR OFFICE/OUTPT VISIT, NEW, LEVL IV, 45-59 MIN: ICD-10-PCS | Mod: S$GLB,,, | Performed by: STUDENT IN AN ORGANIZED HEALTH CARE EDUCATION/TRAINING PROGRAM

## 2022-10-17 PROCEDURE — 86140 C-REACTIVE PROTEIN: CPT | Performed by: STUDENT IN AN ORGANIZED HEALTH CARE EDUCATION/TRAINING PROGRAM

## 2022-10-17 PROCEDURE — 85025 COMPLETE CBC W/AUTO DIFF WBC: CPT | Performed by: STUDENT IN AN ORGANIZED HEALTH CARE EDUCATION/TRAINING PROGRAM

## 2022-10-17 PROCEDURE — 99999 PR PBB SHADOW E&M-EST. PATIENT-LVL III: ICD-10-PCS | Mod: PBBFAC,,, | Performed by: STUDENT IN AN ORGANIZED HEALTH CARE EDUCATION/TRAINING PROGRAM

## 2022-10-17 PROCEDURE — 3079F DIAST BP 80-89 MM HG: CPT | Mod: CPTII,S$GLB,, | Performed by: STUDENT IN AN ORGANIZED HEALTH CARE EDUCATION/TRAINING PROGRAM

## 2022-10-17 PROCEDURE — 99204 OFFICE O/P NEW MOD 45 MIN: CPT | Mod: S$GLB,,, | Performed by: STUDENT IN AN ORGANIZED HEALTH CARE EDUCATION/TRAINING PROGRAM

## 2022-10-17 PROCEDURE — 3079F PR MOST RECENT DIASTOLIC BLOOD PRESSURE 80-89 MM HG: ICD-10-PCS | Mod: CPTII,S$GLB,, | Performed by: STUDENT IN AN ORGANIZED HEALTH CARE EDUCATION/TRAINING PROGRAM

## 2022-10-17 PROCEDURE — 86364 TISS TRNSGLTMNASE EA IG CLAS: CPT | Performed by: STUDENT IN AN ORGANIZED HEALTH CARE EDUCATION/TRAINING PROGRAM

## 2022-10-17 PROCEDURE — 83516 IMMUNOASSAY NONANTIBODY: CPT | Performed by: STUDENT IN AN ORGANIZED HEALTH CARE EDUCATION/TRAINING PROGRAM

## 2022-10-17 RX ORDER — DICYCLOMINE HYDROCHLORIDE 10 MG/1
10 CAPSULE ORAL
Qty: 120 CAPSULE | Refills: 0 | Status: SHIPPED | OUTPATIENT
Start: 2022-10-17 | End: 2022-11-11

## 2022-10-17 NOTE — PROGRESS NOTES
[facility] Consultation Note    Reason for Consult:  abdominal pain     PCP:   Esther Brunson   [address] / [place] [address]    Referring MD:  Esther Brunson Md  [address]  [place] [address]    HPI:  This is a 37 y.o. female with here for evaluation of abdominal pain. She had an episode of severe abdominal pain which was associated with N/V and diarrhea. This episode started after she ate BBQ which she does not normally eat. The diarrhea lasted 2 days and occurred 1-2 times per day. Non bloody. The pain persisted for 4-5 days. Pain was in the central abdomen and was cramping in nature. Radiated to her left shoulder blade. It felt similar to an episode of pain she had a on her honeymoon in 2019 in [place]. She saw a doctor on her return home who felt it was likely gallstone. She did not have any imaging or have her gallbladder out however. She is overall feeling better now and symptoms have resolved mostly. She states she has episodes of N/V and pain once a month although they are not at severe as the one she had recently. Has noted that her menstrual cycle is a trigger. Also fatty foods give her symptoms. Intermittent reflux but no dysphagia, hematemesis. Weight is stable.     She has had a prior liver disease work up at [facility] in the past as she had elevated liver enzymes. This work up was negative she states and included blood work and imaging. Most recent CBC is from 2021 which showed Hg 10 and platelets 111. This blood work was done during her most recent pregnancy. Denied NSAID use. No fh of gastric cancer, colon cancer, celiac or IBD. No abdominal imaging or prior endoscopy.     She does have intermittent issues with hemorrhoids which flare during her pregnancies most of the time. No recent issues. No blood in stool. Stools occur daily and are soft and formed. There is no straining. She did do pelvic floor therapy in the past  which she found helpful.       ROS:  Constitutional: No fevers, chills, No weight loss  ENT: No allergies  CV: No chest pain  Pulm: No cough, No shortness of breath  Ophtho: No vision changes  GI: see HPI  Derm: No rash  Heme: No lymphadenopathy, No bruising  MSK: No arthritis  : No dysuria, No hematuria  Endo: No hot or cold intolerance  Neuro: No syncope, No seizure  Psych: No anxiety, No depression    Medical History:  has a past medical history of Migraines () and PROM (premature rupture of membranes) (2021).    Surgical History:  has a past surgical history that includes [place] tooth extraction ();  section (N/A, 2021); and  section.    Family History: family history includes Emphysema in her maternal grandmother and paternal grandfather; Fibromyalgia in her mother; Hypertension in her mother; No Known Problems in her father, maternal grandfather, paternal grandmother, sister, and sister..      Social History:  reports that she has never smoked. She has never used smokeless tobacco. She reports that she does not currently use alcohol. She reports current drug use. Drug: Marijuana.    Review of patient's allergies indicates:   Allergen Reactions    Yellow dye Other (See Comments)     GI upset       Current Outpatient Rx   Medication Sig Dispense Refill    EScitalopram oxalate (LEXAPRO) 10 MG tablet Take 1 tablet (10 mg total) by mouth once daily. 90 tablet 1    LORazepam (ATIVAN) 0.5 MG tablet Take 0.5 mg by mouth daily as needed.      PNV-DHA 27 mg iron-1 mg -300 mg Cap TAKE 1 CAPSULE BY MOUTH ONCE DAILY AT 6AM. 90 capsule 4    b complex vitamins tablet Take 1 tablet by mouth once daily.      dicyclomine (BENTYL) 10 MG capsule Take 1 capsule (10 mg total) by mouth 4 (four) times daily before meals and nightly. 120 capsule 0    omega-3 fatty acids/fish oil (FISH OIL-OMEGA-3 FATTY ACIDS) 300-1,000 mg capsule Take by mouth once daily.         Objective Findings:    Vital  "Signs:  /85   Pulse 73   Ht 5' 4" (1.626 m)   Wt 50 kg (110 lb 4.8 oz)   BMI 18.93 kg/m²   Body mass index is 18.93 kg/m².    Physical Exam:  General Appearance: Well appearing in no acute distress  Head:   Normocephalic, without obvious abnormality  Eyes:    No scleral icterus, EOMI  ENT: Neck supple, Lips, mucosa, and tongue normal    Lungs: CTA bilaterally in anterior and posterior fields, no wheezes, no crackles.  Heart:  Regular rate and rhythm, S1, S2 normal, no murmurs heard  Abdomen: Soft, non tender, non distended with positive bowel sounds in all four quadrants. No hepatosplenomegaly, ascites, or mass  Extremities: 2+ pulses, no clubbing, cyanosis or edema  Skin: No rash  Neurologic: no focal deficits       Labs:  Lab Results   Component Value Date    WBC 19.12 (H) 01/27/2021    HGB 10.8 (L) 01/27/2021    HCT 31.0 (L) 01/27/2021     (L) 01/27/2021    ALT 11 08/18/2020    AST 37 08/18/2020     08/18/2020    K 4.0 08/18/2020     08/18/2020    CREATININE 0.6 08/18/2020    BUN 17 08/18/2020    CO2 22 (L) 08/18/2020       Imaging: reviewed     Endoscopy:  none     Assessment:    Abdominal pain   Normocytic Anemia   Thrombocytopenia     Patient with episodic abdominal pain with nausea and diarrhea. Most recent episode was the most severe and felt different than her prior episodes. She has a mild anemia but this blood work was done during recent pregnancy. Will repeat today with iron stores. Also noted to have low platelets and states she had a negative liver disease work up in the past. Will obtain US Ab to evaluate gallbladder and liver. Pain may be biliary in nature and if there are gallstones will consider surgery referral. Will also check CMP and HP stool. Also, due to diarrhea associated with episodes will check celiac, CRP and Calpro. If work up is unrevealing, this may be a cyclical vomiting syndrome and she may benefit from TCA      Recommendations:    - CBC, CMP and iron " panel today   - US ab   - CRP, calpro, HP stool and celiac panel   - Bentyl PRN for pain     RTC 3 months       Order summary:  Orders Placed This Encounter    US Abdomen Complete    CBC Auto Differential    COMPREHENSIVE METABOLIC PANEL    IRON AND TIBC    FERRITIN    Celiac Disease Panel    C-reactive protein    Calprotectin, Stool    H. pylori antigen, stool    dicyclomine (BENTYL) 10 MG capsule         Thank you so much for allowing me to participate in the care of Loren Carrasquillo MD  Gastroenterology   [facility]

## 2022-10-18 ENCOUNTER — HOSPITAL ENCOUNTER (OUTPATIENT)
Dept: RADIOLOGY | Facility: HOSPITAL | Age: 37
Discharge: HOME | End: 2022-10-18
Attending: STUDENT IN AN ORGANIZED HEALTH CARE EDUCATION/TRAINING PROGRAM
Payer: COMMERCIAL

## 2022-10-18 DIAGNOSIS — R10.9 ABDOMINAL PAIN, UNSPECIFIED ABDOMINAL LOCATION: ICD-10-CM

## 2022-10-18 PROCEDURE — 76700 US EXAM ABDOM COMPLETE: CPT | Mod: TC

## 2022-10-18 PROCEDURE — 76700 US EXAM ABDOM COMPLETE: CPT | Mod: 26,,, | Performed by: RADIOLOGY

## 2022-10-18 PROCEDURE — 76700 US ABDOMEN COMPLETE: ICD-10-PCS | Mod: 26,,, | Performed by: RADIOLOGY

## 2022-10-19 ENCOUNTER — PATIENT MESSAGE (OUTPATIENT)
Dept: GASTROENTEROLOGY | Facility: CLINIC | Age: 37
End: 2022-10-19
Payer: COMMERCIAL

## 2022-10-19 ENCOUNTER — LAB VISIT (OUTPATIENT)
Dept: LAB | Facility: OTHER | Age: 37
End: 2022-10-19
Attending: STUDENT IN AN ORGANIZED HEALTH CARE EDUCATION/TRAINING PROGRAM
Payer: COMMERCIAL

## 2022-10-19 DIAGNOSIS — R10.9 ABDOMINAL PAIN, UNSPECIFIED ABDOMINAL LOCATION: ICD-10-CM

## 2022-10-19 DIAGNOSIS — R11.2 NAUSEA AND VOMITING, UNSPECIFIED VOMITING TYPE: ICD-10-CM

## 2022-10-19 PROCEDURE — 87338 HPYLORI STOOL AG IA: CPT | Performed by: STUDENT IN AN ORGANIZED HEALTH CARE EDUCATION/TRAINING PROGRAM

## 2022-10-19 PROCEDURE — 83993 ASSAY FOR CALPROTECTIN FECAL: CPT | Performed by: STUDENT IN AN ORGANIZED HEALTH CARE EDUCATION/TRAINING PROGRAM

## 2022-10-20 LAB
GLIADIN PEPTIDE IGA SER-ACNC: 5 UNITS
GLIADIN PEPTIDE IGG SER-ACNC: 3 UNITS
IGA SERPL-MCNC: 192 MG/DL (ref 70–400)
TTG IGA SER-ACNC: 6 UNITS
TTG IGG SER-ACNC: 7 UNITS

## 2022-10-26 LAB
CALPROTECTIN STL-MCNT: <27.1 MCG/G
H PYLORI AG STL QL IA: NOT DETECTED
SPECIMEN SOURCE: NORMAL

## 2022-10-27 ENCOUNTER — LAB VISIT (OUTPATIENT)
Dept: LAB | Facility: HOSPITAL | Age: 37
End: 2022-10-27
Payer: COMMERCIAL

## 2022-10-27 ENCOUNTER — OFFICE VISIT (OUTPATIENT)
Dept: HEPATOLOGY | Facility: CLINIC | Age: 37
End: 2022-10-27
Payer: COMMERCIAL

## 2022-10-27 VITALS
HEIGHT: 64 IN | TEMPERATURE: 97 F | RESPIRATION RATE: 18 BRPM | OXYGEN SATURATION: 97 % | HEART RATE: 96 BPM | WEIGHT: 113.13 LBS | SYSTOLIC BLOOD PRESSURE: 118 MMHG | BODY MASS INDEX: 19.31 KG/M2 | DIASTOLIC BLOOD PRESSURE: 69 MMHG

## 2022-10-27 DIAGNOSIS — R74.8 ELEVATED LIVER ENZYMES: Primary | ICD-10-CM

## 2022-10-27 DIAGNOSIS — R74.8 ELEVATED LIVER ENZYMES: ICD-10-CM

## 2022-10-27 LAB
ALBUMIN SERPL BCP-MCNC: 4 G/DL (ref 3.5–5.2)
ALP SERPL-CCNC: 103 U/L (ref 55–135)
ALT SERPL W/O P-5'-P-CCNC: 27 U/L (ref 10–44)
AST SERPL-CCNC: 60 U/L (ref 10–40)
BILIRUB DIRECT SERPL-MCNC: 0.2 MG/DL (ref 0.1–0.3)
BILIRUB SERPL-MCNC: 0.4 MG/DL (ref 0.1–1)
CK SERPL-CCNC: 72 U/L (ref 20–180)
FERRITIN SERPL-MCNC: 72 NG/ML (ref 20–300)
HAV IGG SER QL IA: NORMAL
HAV IGM SERPL QL IA: NORMAL
HBV CORE AB SERPL QL IA: NORMAL
HBV CORE IGM SERPL QL IA: NORMAL
HBV SURFACE AB SER-ACNC: 31.79 MIU/ML
HBV SURFACE AB SER-ACNC: REACTIVE M[IU]/ML
HBV SURFACE AG SERPL QL IA: NORMAL
HCV AB SERPL QL IA: NORMAL
IGG SERPL-MCNC: 1064 MG/DL (ref 650–1600)
IRON SERPL-MCNC: 54 UG/DL (ref 30–160)
PROT SERPL-MCNC: 6.9 G/DL (ref 6–8.4)
SATURATED IRON: 14 % (ref 20–50)
TOTAL IRON BINDING CAPACITY: 382 UG/DL (ref 250–450)
TRANSFERRIN SERPL-MCNC: 258 MG/DL (ref 200–375)

## 2022-10-27 PROCEDURE — 86790 VIRUS ANTIBODY NOS: CPT | Performed by: NURSE PRACTITIONER

## 2022-10-27 PROCEDURE — 1160F RVW MEDS BY RX/DR IN RCRD: CPT | Mod: CPTII,S$GLB,, | Performed by: NURSE PRACTITIONER

## 2022-10-27 PROCEDURE — 86704 HEP B CORE ANTIBODY TOTAL: CPT | Performed by: NURSE PRACTITIONER

## 2022-10-27 PROCEDURE — 99203 OFFICE O/P NEW LOW 30 MIN: CPT | Mod: S$GLB,,, | Performed by: NURSE PRACTITIONER

## 2022-10-27 PROCEDURE — 82784 ASSAY IGA/IGD/IGG/IGM EACH: CPT | Performed by: NURSE PRACTITIONER

## 2022-10-27 PROCEDURE — 99999 PR PBB SHADOW E&M-EST. PATIENT-LVL IV: ICD-10-PCS | Mod: PBBFAC,,, | Performed by: NURSE PRACTITIONER

## 2022-10-27 PROCEDURE — 99203 PR OFFICE/OUTPT VISIT, NEW, LEVL III, 30-44 MIN: ICD-10-PCS | Mod: S$GLB,,, | Performed by: NURSE PRACTITIONER

## 2022-10-27 PROCEDURE — 86015 ACTIN ANTIBODY EACH: CPT | Performed by: NURSE PRACTITIONER

## 2022-10-27 PROCEDURE — 80074 ACUTE HEPATITIS PANEL: CPT | Performed by: NURSE PRACTITIONER

## 2022-10-27 PROCEDURE — 86038 ANTINUCLEAR ANTIBODIES: CPT | Performed by: NURSE PRACTITIONER

## 2022-10-27 PROCEDURE — 1159F PR MEDICATION LIST DOCUMENTED IN MEDICAL RECORD: ICD-10-PCS | Mod: CPTII,S$GLB,, | Performed by: NURSE PRACTITIONER

## 2022-10-27 PROCEDURE — 82550 ASSAY OF CK (CPK): CPT | Performed by: NURSE PRACTITIONER

## 2022-10-27 PROCEDURE — 1159F MED LIST DOCD IN RCRD: CPT | Mod: CPTII,S$GLB,, | Performed by: NURSE PRACTITIONER

## 2022-10-27 PROCEDURE — 99999 PR PBB SHADOW E&M-EST. PATIENT-LVL IV: CPT | Mod: PBBFAC,,, | Performed by: NURSE PRACTITIONER

## 2022-10-27 PROCEDURE — 3078F PR MOST RECENT DIASTOLIC BLOOD PRESSURE < 80 MM HG: ICD-10-PCS | Mod: CPTII,S$GLB,, | Performed by: NURSE PRACTITIONER

## 2022-10-27 PROCEDURE — 3074F PR MOST RECENT SYSTOLIC BLOOD PRESSURE < 130 MM HG: ICD-10-PCS | Mod: CPTII,S$GLB,, | Performed by: NURSE PRACTITIONER

## 2022-10-27 PROCEDURE — 1160F PR REVIEW ALL MEDS BY PRESCRIBER/CLIN PHARMACIST DOCUMENTED: ICD-10-PCS | Mod: CPTII,S$GLB,, | Performed by: NURSE PRACTITIONER

## 2022-10-27 PROCEDURE — 82728 ASSAY OF FERRITIN: CPT | Performed by: NURSE PRACTITIONER

## 2022-10-27 PROCEDURE — 3078F DIAST BP <80 MM HG: CPT | Mod: CPTII,S$GLB,, | Performed by: NURSE PRACTITIONER

## 2022-10-27 PROCEDURE — 84466 ASSAY OF TRANSFERRIN: CPT | Performed by: NURSE PRACTITIONER

## 2022-10-27 PROCEDURE — 82103 ALPHA-1-ANTITRYPSIN TOTAL: CPT | Performed by: NURSE PRACTITIONER

## 2022-10-27 PROCEDURE — 3074F SYST BP LT 130 MM HG: CPT | Mod: CPTII,S$GLB,, | Performed by: NURSE PRACTITIONER

## 2022-10-27 PROCEDURE — 86706 HEP B SURFACE ANTIBODY: CPT | Performed by: NURSE PRACTITIONER

## 2022-10-27 PROCEDURE — 80076 HEPATIC FUNCTION PANEL: CPT | Performed by: NURSE PRACTITIONER

## 2022-10-27 NOTE — Clinical Note
Explanation of labs sent to pt in [facility] Please contact pt to set up the following -- hepatic function panel lab in 3 month and 6 months -- f/u with me in 1 year with hepatic function panel a few days before, fibroscan same day as appt  Thanks !

## 2022-10-27 NOTE — PROGRESS NOTES
[facility] VISIT NEW PT NOTE    REFERRING PROVIDER:  Dr. Noemi Carrasquillo  PCP: Esther Brunson MD     CHIEF COMPLAINT: elevated AST    HPI: This is a 37 y.o. White female with PMH noted below, presenting for evaluation of elevated liver enzymes    Of note: reports she was evaluated by [facility] hepatology in ~2017 for elevated liver enzymes intermittently since ~. Mom with elevated liver enzymes of unclear etiology. Dad with fatty liver and A1AT (mild per pt report). Pt reports she had a fibroscan that was reported as normal     Previous serologic w/u negative for viral hepatitis B - will complete sero w/u  Ferritin normal but iron sat 53 - will repeat     Prior serologic workup:   Lab Results   Component Value Date    FERRITIN 89 10/17/2022    FESATURATED 53 (H) 10/17/2022    HEPBSAG Negative 2020       Liver fibrosis staging:  -- fibroscan if liver enzyme elevation chronic     No Risk factors for NAFLD     Interval HPI: Presents today alone. Is potentially trying to conceive so taking pre natali vitamins with high iron  Iron on labs >50  No Herbal medications, no new  New medications or med changes   Is having intermittent abd pain that does not currently have a source, may need scopes per GI in the future   Some muscle weakness/soreness intermittently     Labs done 10/2022 show elevated transaminase levels (AST>ALT, limited prior labs to review but pt reports  AST, elevated x1 in , normal in  but pt reports intermittent elevated for years )  Platelets WNL, alk phos WNL  Synthetic liver functioning WNL    Lab Results   Component Value Date    ALT 23 10/17/2022    AST 57 (H) 10/17/2022    ALKPHOS 73 10/17/2022    BILITOT 0.8 10/17/2022    ALBUMIN 4.0 10/17/2022     10/17/2022       Abd U/S done 10/2022 showed normal liver     +family history of liver disease: see above. Denies significant alcohol consumption currently or in the past  Social History     Substance and Sexual  "Activity   Alcohol Use Not Currently    Comment: intermittent few times month, 1-5 servings at a time         Immunity to Hep A and B - will check with next labs          Allergy and medication list reviewed and updated     PMHX:  has a past medical history of Migraines () and PROM (premature rupture of membranes) (2021).    PSHX:  has a past surgical history that includes [place] tooth extraction ();  section (N/A, 2021); and  section.    FAMILY HISTORY: Updated and reviewed in [facility]    SOCIAL HISTORY:   Social History     Substance and Sexual Activity   Alcohol Use Not Currently    Comment: intermittent few times month, 1-5 servings at a time       Social History     Substance and Sexual Activity   Drug Use Yes    Types: Marijuana    Comment: past, none during pregnancy       ROS:   GENERAL: Denies fatigue  CARDIOVASCULAR: Denies edema  GI: Denies abdominal pain  SKIN: Denies rash, itching   NEURO: Denies confusion, memory loss, or mood changes    PHYSICAL EXAM:   Friendly White female, in no acute distress; alert and oriented to person, place and time  VITALS: /69 (BP Location: Left arm, Patient Position: Sitting, BP Method: Medium (Automatic))   Pulse 96   Temp 97.1 °F (36.2 °C) (Oral)   Resp 18   Ht 5' 4" (1.626 m)   Wt 51.3 kg (113 lb 1.5 oz)   SpO2 97%   BMI 19.41 kg/m²   EYES: Sclerae anicteric  GI: Soft, non-tender, non-distended. No ascites.  EXTREMITIES:  No edema.  SKIN: Warm and dry. No jaundice. No telangectasias noted. No palmar erythema.  NEURO:  No asterixis.  PSYCH:  Thought and speech pattern appropriate. Behavior normal      EDUCATION:  See instructions discussed with patient in Instructions section of the After Visit Summary     ASSESSMENT & PLAN:  37 y.o. White female with:  1. Elevated liver enzymes   -- Labs note elevated AST x1 but pt reports intermittent elevation for years, will request [facility] records to review  --- synthetic liver function " WNL  --- Abd US done 10/2022 showed normal liver   -- do not suspect any medications contributing   --- previous serological work up : see HPI, will obtain   --- Hep A and B immunity: will check today, will arrange Hep A and B vaccines if needed    -- labs today  Orders Placed This Encounter   Procedures    Iron and TIBC    Ferritin    Alpha 1 Antitrypsin Phenotype    JESSY Screen w/Reflex    Anti-Smooth Muscle Antibody    IgG    Hepatitis Panel, Acute    CK    Hepatic Function Panel    Hepatitis A antibody, IgG    Hepatitis B Core Antibody, Total    Hepatitis B Surface Ab, Qualitative      -- fibroscan : will repeat if chronically elevated     2. Father with A1AT def  -- will obtain phenotype     3. Abd pain  -- do not suspect liver source, although intermittent abd pain can cause AST elevation, continue GI w/u    ADDENDUM 12/2/2022: Serological workup was negative for alpha-1 antitrypsin deficiency, hemochromatosis, autoimmune etiology, and viral hepatitis B and C   CK WNL  No concerning findings on workup  Is on Lexapro starting March 2022 but DILI Category C and typically doesn't require dose modification or d/c so will just monitor labs  HFP in 3 and 6 months then f/u with HFP and fibroscan before      Follow up for pending results of workup. Based on lab results   Orders Placed This Encounter   Procedures    Iron and TIBC    Ferritin    Alpha 1 Antitrypsin Phenotype    JESSY Screen w/Reflex    Anti-Smooth Muscle Antibody    IgG    Hepatitis Panel, Acute    CK    Hepatic Function Panel    Hepatitis A antibody, IgG    Hepatitis B Core Antibody, Total    Hepatitis B Surface Ab, Qualitative        Thank you for allowing me to participate in the care of Loren HicksGILBERTO Sampson    I spent a total of 30 minutes on the day of the visit.This includes face to face time and non-face to face time preparing to see the patient (eg, review of tests), obtaining and/or reviewing separately obtained  history, documenting clinical information in the electronic or other health record, independently interpreting results and communicating results to the patient/family/caregiver, and coordinating care.         CC'ed note to:   Noemi Carrasquillo MD Lora M Langefels, MD

## 2022-10-27 NOTE — PATIENT INSTRUCTIONS
1.  Will check immunity markers for Hepatitis A and B and arrange for vaccination if needed  2. Labs today to  check for multiple causes of liver disease. These labs will release to you as soon as they are resulted but we will discuss them in detail at your upcoming visit to discuss what the lab results mean.   3.  Follow up based on results of labs

## 2022-10-28 ENCOUNTER — TELEPHONE (OUTPATIENT)
Dept: GASTROENTEROLOGY | Facility: CLINIC | Age: 37
End: 2022-10-28
Payer: COMMERCIAL

## 2022-10-28 ENCOUNTER — PATIENT MESSAGE (OUTPATIENT)
Dept: GASTROENTEROLOGY | Facility: CLINIC | Age: 37
End: 2022-10-28
Payer: COMMERCIAL

## 2022-10-28 DIAGNOSIS — R10.9 ABDOMINAL PAIN, UNSPECIFIED ABDOMINAL LOCATION: Primary | ICD-10-CM

## 2022-10-28 RX ORDER — OMEPRAZOLE 40 MG/1
40 CAPSULE, DELAYED RELEASE ORAL DAILY
Qty: 30 CAPSULE | Refills: 11 | Status: SHIPPED | OUTPATIENT
Start: 2022-10-28 | End: 2022-11-11

## 2022-10-28 NOTE — PROGRESS NOTES
GI plan of care     Patients blood work and stool testing from recent clinic visit are unrevealing as far as cause of abdominal pain and N/V. US also unrevealing. Continue PPI and will plan for EGD to rule out PUD as cause of symptoms.      Noemi Carrasquillo  GI

## 2022-10-28 NOTE — TELEPHONE ENCOUNTER
Contact and spoke with patient. Schedule patient pre-admit appointment.     Patient verbalized understanding.

## 2022-10-28 NOTE — TELEPHONE ENCOUNTER
----- Message from Mary Herbert MA sent at 10/28/2022  1:57 PM CDT -----  Can you help?  ----- Message -----  From: Noemi Carrasquillo MD  Sent: 10/28/2022   9:22 AM CDT  To: [facility] Staff    I ordered an EGD on this patient can someone help me make an appt with the schedulers

## 2022-10-31 ENCOUNTER — OFFICE VISIT (OUTPATIENT)
Dept: OBSTETRICS AND GYNECOLOGY | Facility: CLINIC | Age: 37
End: 2022-10-31
Attending: OBSTETRICS & GYNECOLOGY
Payer: COMMERCIAL

## 2022-10-31 VITALS
DIASTOLIC BLOOD PRESSURE: 58 MMHG | SYSTOLIC BLOOD PRESSURE: 102 MMHG | HEIGHT: 64 IN | WEIGHT: 110.25 LBS | BODY MASS INDEX: 18.82 KG/M2

## 2022-10-31 DIAGNOSIS — Z31.69 ENCOUNTER FOR PRECONCEPTION CONSULTATION: Primary | ICD-10-CM

## 2022-10-31 LAB — SMOOTH MUSCLE AB TITR SER IF: NORMAL {TITER}

## 2022-10-31 PROCEDURE — 99213 PR OFFICE/OUTPT VISIT, EST, LEVL III, 20-29 MIN: ICD-10-PCS | Mod: S$GLB,,, | Performed by: OBSTETRICS & GYNECOLOGY

## 2022-10-31 PROCEDURE — 3074F SYST BP LT 130 MM HG: CPT | Mod: CPTII,S$GLB,, | Performed by: OBSTETRICS & GYNECOLOGY

## 2022-10-31 PROCEDURE — 1159F MED LIST DOCD IN RCRD: CPT | Mod: CPTII,S$GLB,, | Performed by: OBSTETRICS & GYNECOLOGY

## 2022-10-31 PROCEDURE — 1159F PR MEDICATION LIST DOCUMENTED IN MEDICAL RECORD: ICD-10-PCS | Mod: CPTII,S$GLB,, | Performed by: OBSTETRICS & GYNECOLOGY

## 2022-10-31 PROCEDURE — 99999 PR PBB SHADOW E&M-EST. PATIENT-LVL III: CPT | Mod: PBBFAC,,, | Performed by: OBSTETRICS & GYNECOLOGY

## 2022-10-31 PROCEDURE — 3078F PR MOST RECENT DIASTOLIC BLOOD PRESSURE < 80 MM HG: ICD-10-PCS | Mod: CPTII,S$GLB,, | Performed by: OBSTETRICS & GYNECOLOGY

## 2022-10-31 PROCEDURE — 99999 PR PBB SHADOW E&M-EST. PATIENT-LVL III: ICD-10-PCS | Mod: PBBFAC,,, | Performed by: OBSTETRICS & GYNECOLOGY

## 2022-10-31 PROCEDURE — 99213 OFFICE O/P EST LOW 20 MIN: CPT | Mod: S$GLB,,, | Performed by: OBSTETRICS & GYNECOLOGY

## 2022-10-31 PROCEDURE — 3074F PR MOST RECENT SYSTOLIC BLOOD PRESSURE < 130 MM HG: ICD-10-PCS | Mod: CPTII,S$GLB,, | Performed by: OBSTETRICS & GYNECOLOGY

## 2022-10-31 PROCEDURE — 3078F DIAST BP <80 MM HG: CPT | Mod: CPTII,S$GLB,, | Performed by: OBSTETRICS & GYNECOLOGY

## 2022-10-31 NOTE — PROGRESS NOTES
"CC:preconception counseling    HPI:history of  section x1 in 2021 she was 41w 3d at that time.  Interested in possible  and change to Ob practice from midwife practice.  Would like to discuss course of care, safety of TOLAC, level of intervention to expect etc...  Reports that she is also going through a GI work-up at this time for suspected ulcer and elevated liver enzymes.  Discussed she will possibly need scope, encouraged her to work this up as thoroughly as possible prior to pregnancy.  We reviewed meds and bentyl is safe in pregnancy, would need to limit amitriptyline/ nortriptyline in third trimester.      ROS:  GENERAL: Feeling well overall. Denies fever or chills.   SKIN: Denies rash or lesions.   HEAD: Denies head injury or headache.   NODES: Denies enlarged lymph nodes.   CHEST: Denies chest pain or shortness of breath.   CARDIOVASCULAR: Denies palpitations or left sided chest pain.   ABDOMEN: see HPI  REPRODUCTIVE: See HPI.   BREASTS: Denies pain, lumps, or nipple discharge.       PE:   BP (!) 102/58   Ht 5' 4" (1.626 m)   Wt 50 kg (110 lb 3.7 oz)   LMP 10/12/2022 (Exact Date)   Breastfeeding No   BMI 18.92 kg/m²     APPEARANCE: Well nourished, well developed, White female in no acute distress.  Total face to face time spent with the patient was 25 minutes and over half spent in counseling on the above related issues. Records reviewed and medication safety review performed.        Diagnosis:  1. Encounter for preconception consultation        Plan:   -Encouraged completion of GI work-up prior to pregnancy  -Records reviewed including operative note, no contraindication to TOLAC based on location of uterine hysterotomy.  Verified as low transverse.   - stay on prenatal vitamin  -discussed course of care within the Ob practice, etc... all questions answered.          Kylee Harris,       "

## 2022-11-01 LAB — ANA SER QL IF: NORMAL

## 2022-11-02 LAB
A1AT PHENOTYP SERPL-IMP: NORMAL
A1AT SERPL NEPH-MCNC: 158 MG/DL (ref 100–190)

## 2022-11-04 ENCOUNTER — PATIENT MESSAGE (OUTPATIENT)
Dept: HEPATOLOGY | Facility: CLINIC | Age: 37
End: 2022-11-04
Payer: COMMERCIAL

## 2022-11-04 DIAGNOSIS — Z23 NEED FOR HEPATITIS A VACCINATION: Primary | ICD-10-CM

## 2022-11-14 ENCOUNTER — TELEPHONE (OUTPATIENT)
Dept: FAMILY MEDICINE | Facility: CLINIC | Age: 37
End: 2022-11-14
Payer: COMMERCIAL

## 2022-11-14 ENCOUNTER — PATIENT MESSAGE (OUTPATIENT)
Dept: FAMILY MEDICINE | Facility: CLINIC | Age: 37
End: 2022-11-14
Payer: COMMERCIAL

## 2022-11-14 DIAGNOSIS — R79.89 ABNORMAL LFTS: Primary | ICD-10-CM

## 2022-11-14 NOTE — TELEPHONE ENCOUNTER
----- Message from Esther Brunson MD sent at 11/14/2022  1:15 PM CST -----  Regarding: lft  Please help pt schedule a repeat lft for 2 weeks

## 2022-11-30 ENCOUNTER — PATIENT MESSAGE (OUTPATIENT)
Dept: FAMILY MEDICINE | Facility: CLINIC | Age: 37
End: 2022-11-30
Payer: COMMERCIAL

## 2022-11-30 ENCOUNTER — PATIENT MESSAGE (OUTPATIENT)
Dept: HEPATOLOGY | Facility: CLINIC | Age: 37
End: 2022-11-30
Payer: COMMERCIAL

## 2022-12-02 ENCOUNTER — TELEPHONE (OUTPATIENT)
Dept: HEPATOLOGY | Facility: CLINIC | Age: 37
End: 2022-12-02
Payer: COMMERCIAL

## 2022-12-13 ENCOUNTER — CLINICAL SUPPORT (OUTPATIENT)
Dept: ENDOSCOPY | Facility: HOSPITAL | Age: 37
End: 2022-12-13
Attending: STUDENT IN AN ORGANIZED HEALTH CARE EDUCATION/TRAINING PROGRAM
Payer: COMMERCIAL

## 2022-12-13 VITALS — HEIGHT: 64 IN | BODY MASS INDEX: 19.63 KG/M2 | WEIGHT: 115 LBS

## 2022-12-13 DIAGNOSIS — R10.9 ABDOMINAL PAIN, UNSPECIFIED ABDOMINAL LOCATION: ICD-10-CM

## 2022-12-13 NOTE — PLAN OF CARE
Endoscopy procedure scheduled on 1/11/23, prep instructions reviewed, Pt verbalized understanding.

## 2023-01-05 ENCOUNTER — PATIENT MESSAGE (OUTPATIENT)
Dept: ENDOSCOPY | Facility: HOSPITAL | Age: 38
End: 2023-01-05
Payer: COMMERCIAL

## 2023-01-05 ENCOUNTER — TELEPHONE (OUTPATIENT)
Dept: ENDOSCOPY | Facility: HOSPITAL | Age: 38
End: 2023-01-05
Payer: COMMERCIAL

## 2023-01-06 DIAGNOSIS — R10.9 ABDOMINAL PAIN, UNSPECIFIED ABDOMINAL LOCATION: Primary | ICD-10-CM

## 2023-01-16 ENCOUNTER — PATIENT MESSAGE (OUTPATIENT)
Dept: OBSTETRICS AND GYNECOLOGY | Facility: CLINIC | Age: 38
End: 2023-01-16
Payer: COMMERCIAL

## 2023-01-19 ENCOUNTER — CLINICAL SUPPORT (OUTPATIENT)
Dept: OBSTETRICS AND GYNECOLOGY | Facility: CLINIC | Age: 38
End: 2023-01-19
Payer: COMMERCIAL

## 2023-01-19 ENCOUNTER — TELEPHONE (OUTPATIENT)
Dept: OBSTETRICS AND GYNECOLOGY | Facility: CLINIC | Age: 38
End: 2023-01-19
Payer: COMMERCIAL

## 2023-01-19 DIAGNOSIS — N91.2 AMENORRHEA: Primary | ICD-10-CM

## 2023-01-19 NOTE — TELEPHONE ENCOUNTER
Call pt to let her know we can place her on waitlist. Need to tell pt to get confirmed then schedule her initial with us.

## 2023-01-23 ENCOUNTER — TELEPHONE (OUTPATIENT)
Dept: OBSTETRICS AND GYNECOLOGY | Facility: CLINIC | Age: 38
End: 2023-01-23
Payer: COMMERCIAL

## 2023-01-23 ENCOUNTER — PATIENT MESSAGE (OUTPATIENT)
Dept: OBSTETRICS AND GYNECOLOGY | Facility: CLINIC | Age: 38
End: 2023-01-23
Payer: COMMERCIAL

## 2023-01-23 DIAGNOSIS — Z34.90 PREGNANCY, UNSPECIFIED GESTATIONAL AGE: Primary | ICD-10-CM

## 2023-01-23 NOTE — TELEPHONE ENCOUNTER
----- Message from Jeri Epps sent at 1/23/2023  2:19 PM CST -----  Regarding: new OB appt  Name of Who is Calling:RADHA LI [[id number]]          What is the request in detail: Pt said she did her appt w/ ob alfreda. She said that she could not get appt [facility]/ [facility]. She would like to get a new OB appt w/ Ann Marie. Please assist           Can the clinic reply by [facility]:no           What Number to Call Back if not in [facility]:[phone]

## 2023-01-23 NOTE — TELEPHONE ENCOUNTER
Called patient regarding OB care for current pregnancy, gave phone number for Dr Grover and Dr Abernathy at [phone]. Pt agreed and will transfer back with [facility] at later time.

## 2023-01-24 ENCOUNTER — TELEPHONE (OUTPATIENT)
Dept: OBSTETRICS AND GYNECOLOGY | Facility: CLINIC | Age: 38
End: 2023-01-24

## 2023-01-24 DIAGNOSIS — N92.6 MISSED MENSES: Primary | ICD-10-CM

## 2023-01-27 ENCOUNTER — PATIENT MESSAGE (OUTPATIENT)
Dept: OBSTETRICS AND GYNECOLOGY | Facility: CLINIC | Age: 38
End: 2023-01-27
Payer: COMMERCIAL

## 2023-02-03 ENCOUNTER — PATIENT MESSAGE (OUTPATIENT)
Dept: OBSTETRICS AND GYNECOLOGY | Facility: CLINIC | Age: 38
End: 2023-02-03
Payer: COMMERCIAL

## 2023-02-06 ENCOUNTER — PATIENT MESSAGE (OUTPATIENT)
Dept: OBSTETRICS AND GYNECOLOGY | Facility: CLINIC | Age: 38
End: 2023-02-06
Payer: COMMERCIAL

## 2023-02-06 DIAGNOSIS — M62.08 DIASTASIS RECTI: Primary | ICD-10-CM

## 2023-02-14 ENCOUNTER — OFFICE VISIT (OUTPATIENT)
Dept: OBSTETRICS AND GYNECOLOGY | Facility: CLINIC | Age: 38
End: 2023-02-14
Attending: OBSTETRICS & GYNECOLOGY
Payer: COMMERCIAL

## 2023-02-14 ENCOUNTER — HOSPITAL ENCOUNTER (OUTPATIENT)
Dept: PERINATAL CARE | Facility: OTHER | Age: 38
Discharge: HOME | End: 2023-02-14
Attending: OBSTETRICS & GYNECOLOGY
Payer: COMMERCIAL

## 2023-02-14 VITALS
BODY MASS INDEX: 19.87 KG/M2 | SYSTOLIC BLOOD PRESSURE: 102 MMHG | DIASTOLIC BLOOD PRESSURE: 60 MMHG | HEIGHT: 64 IN | WEIGHT: 116.38 LBS

## 2023-02-14 DIAGNOSIS — N91.2 AMENORRHEA: ICD-10-CM

## 2023-02-14 DIAGNOSIS — Z34.90 PREGNANCY, UNSPECIFIED GESTATIONAL AGE: Primary | ICD-10-CM

## 2023-02-14 DIAGNOSIS — Z34.90 PREGNANCY, UNSPECIFIED GESTATIONAL AGE: ICD-10-CM

## 2023-02-14 LAB
B-HCG UR QL: POSITIVE
CTP QC/QA: YES

## 2023-02-14 PROCEDURE — 81025 POCT URINE PREGNANCY: ICD-10-PCS | Mod: S$GLB,,, | Performed by: ADVANCED PRACTICE MIDWIFE

## 2023-02-14 PROCEDURE — 1159F MED LIST DOCD IN RCRD: CPT | Mod: CPTII,S$GLB,, | Performed by: ADVANCED PRACTICE MIDWIFE

## 2023-02-14 PROCEDURE — 3008F PR BODY MASS INDEX (BMI) DOCUMENTED: ICD-10-PCS | Mod: CPTII,S$GLB,, | Performed by: ADVANCED PRACTICE MIDWIFE

## 2023-02-14 PROCEDURE — 81025 URINE PREGNANCY TEST: CPT | Mod: S$GLB,,, | Performed by: ADVANCED PRACTICE MIDWIFE

## 2023-02-14 PROCEDURE — 76801 US OB/GYN PROCEDURE (VIEWPOINT): ICD-10-PCS | Mod: 26,,, | Performed by: OBSTETRICS & GYNECOLOGY

## 2023-02-14 PROCEDURE — 1159F PR MEDICATION LIST DOCUMENTED IN MEDICAL RECORD: ICD-10-PCS | Mod: CPTII,S$GLB,, | Performed by: ADVANCED PRACTICE MIDWIFE

## 2023-02-14 PROCEDURE — 3074F PR MOST RECENT SYSTOLIC BLOOD PRESSURE < 130 MM HG: ICD-10-PCS | Mod: CPTII,S$GLB,, | Performed by: ADVANCED PRACTICE MIDWIFE

## 2023-02-14 PROCEDURE — 87591 N.GONORRHOEAE DNA AMP PROB: CPT | Performed by: ADVANCED PRACTICE MIDWIFE

## 2023-02-14 PROCEDURE — 99999 PR PBB SHADOW E&M-EST. PATIENT-LVL III: CPT | Mod: PBBFAC,,, | Performed by: ADVANCED PRACTICE MIDWIFE

## 2023-02-14 PROCEDURE — 99213 OFFICE O/P EST LOW 20 MIN: CPT | Mod: S$GLB,,, | Performed by: ADVANCED PRACTICE MIDWIFE

## 2023-02-14 PROCEDURE — 76801 OB US < 14 WKS SINGLE FETUS: CPT

## 2023-02-14 PROCEDURE — 3078F DIAST BP <80 MM HG: CPT | Mod: CPTII,S$GLB,, | Performed by: ADVANCED PRACTICE MIDWIFE

## 2023-02-14 PROCEDURE — 99999 PR PBB SHADOW E&M-EST. PATIENT-LVL III: ICD-10-PCS | Mod: PBBFAC,,, | Performed by: ADVANCED PRACTICE MIDWIFE

## 2023-02-14 PROCEDURE — 76801 OB US < 14 WKS SINGLE FETUS: CPT | Mod: 26,,, | Performed by: OBSTETRICS & GYNECOLOGY

## 2023-02-14 PROCEDURE — 3078F PR MOST RECENT DIASTOLIC BLOOD PRESSURE < 80 MM HG: ICD-10-PCS | Mod: CPTII,S$GLB,, | Performed by: ADVANCED PRACTICE MIDWIFE

## 2023-02-14 PROCEDURE — 3074F SYST BP LT 130 MM HG: CPT | Mod: CPTII,S$GLB,, | Performed by: ADVANCED PRACTICE MIDWIFE

## 2023-02-14 PROCEDURE — 99213 PR OFFICE/OUTPT VISIT, EST, LEVL III, 20-29 MIN: ICD-10-PCS | Mod: S$GLB,,, | Performed by: ADVANCED PRACTICE MIDWIFE

## 2023-02-14 PROCEDURE — 87086 URINE CULTURE/COLONY COUNT: CPT | Performed by: ADVANCED PRACTICE MIDWIFE

## 2023-02-14 PROCEDURE — 3008F BODY MASS INDEX DOCD: CPT | Mod: CPTII,S$GLB,, | Performed by: ADVANCED PRACTICE MIDWIFE

## 2023-02-14 NOTE — PROGRESS NOTES
HISTORY OF PRESENT ILLNESS:    Loren Dickerson is a 37 y.o. female, ,  Patient's last menstrual period was 2022.  for a routine exam complaining of amenorrhea.    Pt had a normal Pap 2022. Pt with hx  delivery.    Past Medical History:   Diagnosis Date    Migraines 2020    Only during the beginning of pregnancy    PROM (premature rupture of membranes) 2021       Past Surgical History:   Procedure Laterality Date     SECTION N/A 2021    Procedure:  SECTION;  Surgeon: Jacquelyn Jesus MD;  Location: [facility] L&D;  Service: OB/GYN;  Laterality: N/A;     SECTION      WISDOM TOOTH EXTRACTION      No complications       MEDICATIONS AND ALLERGIES:      Current Outpatient Medications:     EScitalopram oxalate (LEXAPRO) 10 MG tablet, Take 1 tablet (10 mg total) by mouth once daily., Disp: 90 tablet, Rfl: 1    PNV-DHA 27 mg iron-1 mg -300 mg Cap, TAKE 1 CAPSULE BY MOUTH ONCE DAILY AT 6AM., Disp: 90 capsule, Rfl: 4    dicyclomine (BENTYL) 10 MG capsule, TAKE 1 CAPSULE BY MOUTH 4 TIMES DAILY BEFORE MEALS AND NIGHTLY., Disp: 360 capsule, Rfl: 1    LORazepam (ATIVAN) 0.5 MG tablet, Take 0.5 mg by mouth daily as needed., Disp: , Rfl:     omeprazole (PRILOSEC) 40 MG capsule, TAKE 1 CAPSULE BY MOUTH EVERY DAY, Disp: 90 capsule, Rfl: 4    Review of patient's allergies indicates:   Allergen Reactions    Yellow dye Other (See Comments)     GI upset       Family History   Problem Relation Age of Onset    No Known Problems Father     Fibromyalgia Mother     Hypertension Mother     No Known Problems Sister     Emphysema Maternal Grandmother     No Known Problems Maternal Grandfather     No Known Problems Paternal Grandmother     Emphysema Paternal Grandfather     No Known Problems Sister         both children were   32wks, 35wks    Breast cancer Neg Hx     Colon cancer Neg Hx     Ovarian cancer Neg Hx        Social History     Socioeconomic History    Marital  "status:      Spouse name: Ryan    Number of children: 0    Years of education: 19    Highest education level: Master's degree (e.g., MA, MS, [place], MEd, MSW, BEATRICE)   Occupational History    Occupation: [employer],      Employer: [employer]   Tobacco Use    Smoking status: Never    Smokeless tobacco: Never   Substance and Sexual Activity    Alcohol use: Not Currently     Comment: intermittent few times month, 1-5 servings at a time    Drug use: Yes     Types: Marijuana     Comment: past, none during pregnancy    Sexual activity: Yes     Partners: Male     Birth control/protection: None     Comment: Ryan   Social History Narrative    Just moved August 1, still moving. Renovating.    Ryan- nurse @ [employer],         No cats, some gardening (will wear gloves).       COMPREHENSIVE GYN HISTORY:  PAP History: Denies abnormal Paps.  Infection History: Denies STDs. Denies PID.  Benign History: Denies uterine fibroids. Denies ovarian cysts. Denies endometriosis. Denies other conditions.  Cancer History: Denies cervical cancer. Denies uterine cancer or hyperplasia. Denies ovarian cancer. Denies vulvar cancer or pre-cancer. Denies vaginal cancer or pre-cancer. Denies breast cancer. Denies colon cancer.  Sexual Activity History: Reports currently being sexually active  Menstrual History: None.  Contraception: None    ROS:  GENERAL: No weight changes. No swelling. No fatigue. No fever.  CARDIOVASCULAR: No chest pain. No shortness of breath. No leg cramps.   NEUROLOGICAL: No headaches. No vision changes.  BREASTS: No pain. No lumps. No discharge.  ABDOMEN: No pain. No nausea. No vomiting. No diarrhea. No constipation.  REPRODUCTIVE: No abnormal bleeding.   VULVA: No pain. No lesions. No itching.  VAGINA: No relaxation. No itching. No odor. No discharge. No lesions.  URINARY: No incontinence. No nocturia. No frequency. No dysuria.    /60   Ht 5' 4" (1.626 m)   Wt 52.8 kg (116 lb 6.5 oz)   LMP " 12/13/2022   BMI 19.98 kg/m²     PE:  AFFECT: Alert and oriented X 3. Interactive during exam  GENERAL: Appearance well-nourished, well-developed, in no acute distress.  HEENT: WNL  TEETH: Good dentition.  LUNGS: Easy and unlabored  HEART: Regular rate and rhythm   SKIN: No lesions or rashes.    PROCEDURES:  UPT Positive      DIAGNOSIS:  Gyn exam  IUP with stated LMP of 12/13/22    PLAN:Routine prenatal care    MEDICATIONS PRESCRIBED:  PNV    LABS AND TESTS ORDERED:  New Ob Labs      NEW PREGNANCY COUNSELING  Patient was counseled today on:  - Routine prenatal blood tests including HIV and anticipated course of prenatal care  - Prenatal vitamins and folic acid  - Weight gain, nutrition, and exercise  - Seafood and mercury  - Properly heating deli and prepared meats and avoiding unrefrigerated deli  meats, cheeses, and milk products,   - Avoiding cat litter and raw meats due to risk of Toxoplasmosis precautions   - Accuracy of the LMP-based ROSE MARIE and the value of an early TV-u/s  - Aneuploidy and neural tube screening -- cffDNA, sequential screening, and AFP screen at 15 weeks  - OTC medication in the first trimester  - Harmful effects of smoking, etOH, and recreational drugs  - MFM u/s  at 18-20 weeks.  - Common complaints of pregnancy  - Seat belt use  - Childbirth classes and hospital facilities  - All questions were answered    TERATOLOGY COUNSELING:   Discussed indications and options for aneuploidy screening - pamphlets given    -  Pt  considering the Materna 21- will notify us when decides       - Pain and bleeding precautions given    - Return to clinic in 4 weeks- has initial ob scheduled with Dr. Ann Marie Chavez CNM  OB/GYN    Total time of 20 minutes, including face-to-face time and non-face-to-face time preparing to see the patient (eg, review of tests), obtaining and/or reviewing separately obtained history, documenting clinical information in the electronic or other health record, independently  interpreting results, communicating results to the patient/family/caregiver, or care coordination.

## 2023-02-15 LAB
BACTERIA UR CULT: NO GROWTH
C TRACH DNA SPEC QL NAA+PROBE: NOT DETECTED
N GONORRHOEA DNA SPEC QL NAA+PROBE: NOT DETECTED

## 2023-02-19 ENCOUNTER — PATIENT MESSAGE (OUTPATIENT)
Dept: OBSTETRICS AND GYNECOLOGY | Facility: CLINIC | Age: 38
End: 2023-02-19
Payer: COMMERCIAL

## 2023-02-27 ENCOUNTER — TELEPHONE (OUTPATIENT)
Dept: OBSTETRICS AND GYNECOLOGY | Facility: CLINIC | Age: 38
End: 2023-02-27
Payer: COMMERCIAL

## 2023-02-27 ENCOUNTER — PATIENT MESSAGE (OUTPATIENT)
Dept: OBSTETRICS AND GYNECOLOGY | Facility: CLINIC | Age: 38
End: 2023-02-27
Payer: COMMERCIAL

## 2023-02-27 NOTE — TELEPHONE ENCOUNTER
Ret. Pt call regarding spotting starting @ 8-11 am this morning with wiping and a levi size spot in her underwear.  None since.  She is going to [place] on business tomorrow.  I discussed with  her S/S of possible SAB.  She knows if she starts bleeding and/or cramping that she can go to an ED.  She agrees with plan.

## 2023-02-28 ENCOUNTER — TELEPHONE (OUTPATIENT)
Dept: OBSTETRICS AND GYNECOLOGY | Facility: CLINIC | Age: 38
End: 2023-02-28
Payer: COMMERCIAL

## 2023-02-28 NOTE — TELEPHONE ENCOUNTER
Called to notify patient of negative Mat21. She desired to know gender - female. Questions answered.   Moderate Variability

## 2023-03-01 ENCOUNTER — PATIENT MESSAGE (OUTPATIENT)
Dept: HEPATOLOGY | Facility: CLINIC | Age: 38
End: 2023-03-01
Payer: COMMERCIAL

## 2023-03-07 ENCOUNTER — CLINICAL SUPPORT (OUTPATIENT)
Dept: ENDOSCOPY | Facility: HOSPITAL | Age: 38
End: 2023-03-07
Attending: STUDENT IN AN ORGANIZED HEALTH CARE EDUCATION/TRAINING PROGRAM
Payer: COMMERCIAL

## 2023-03-07 DIAGNOSIS — R10.9 ABDOMINAL PAIN, UNSPECIFIED ABDOMINAL LOCATION: ICD-10-CM

## 2023-03-09 ENCOUNTER — PATIENT MESSAGE (OUTPATIENT)
Dept: OBSTETRICS AND GYNECOLOGY | Facility: CLINIC | Age: 38
End: 2023-03-09
Payer: COMMERCIAL

## 2023-03-10 ENCOUNTER — INITIAL PRENATAL (OUTPATIENT)
Dept: OBSTETRICS AND GYNECOLOGY | Facility: CLINIC | Age: 38
End: 2023-03-10
Payer: COMMERCIAL

## 2023-03-10 ENCOUNTER — PATIENT MESSAGE (OUTPATIENT)
Dept: MATERNAL FETAL MEDICINE | Facility: CLINIC | Age: 38
End: 2023-03-10
Payer: COMMERCIAL

## 2023-03-10 VITALS — WEIGHT: 118.81 LBS | SYSTOLIC BLOOD PRESSURE: 106 MMHG | DIASTOLIC BLOOD PRESSURE: 62 MMHG | BODY MASS INDEX: 20.4 KG/M2

## 2023-03-10 DIAGNOSIS — Z98.891 HISTORY OF CESAREAN SECTION: Primary | ICD-10-CM

## 2023-03-10 DIAGNOSIS — O09.521 MULTIGRAVIDA OF ADVANCED MATERNAL AGE IN FIRST TRIMESTER: ICD-10-CM

## 2023-03-10 PROCEDURE — 0502F SUBSEQUENT PRENATAL CARE: CPT | Mod: CPTII,S$GLB,, | Performed by: OBSTETRICS & GYNECOLOGY

## 2023-03-10 PROCEDURE — 99999 PR PBB SHADOW E&M-EST. PATIENT-LVL II: CPT | Mod: PBBFAC,,, | Performed by: OBSTETRICS & GYNECOLOGY

## 2023-03-10 PROCEDURE — 99999 PR PBB SHADOW E&M-EST. PATIENT-LVL II: ICD-10-PCS | Mod: PBBFAC,,, | Performed by: OBSTETRICS & GYNECOLOGY

## 2023-03-10 PROCEDURE — 0502F PR SUBSEQUENT PRENATAL CARE: ICD-10-PCS | Mod: CPTII,S$GLB,, | Performed by: OBSTETRICS & GYNECOLOGY

## 2023-03-10 RX ORDER — ESCITALOPRAM OXALATE 5 MG/1
5 TABLET ORAL
COMMUNITY
Start: 2023-02-22 | End: 2023-03-23 | Stop reason: SDUPTHER

## 2023-03-10 NOTE — PROGRESS NOTES
Pregnancy dating, labs, ultrasound reports, prenatal testing, and problem list; prior records and results; and available outside records were reviewed and updated in EMR.  Pertinent findings were noted below.    Reason for Visit: Initial Prenatal Visit    12w3d by Estimated Date of Delivery: 23    Blood pressure 106/62, weight 53.9 kg (118 lb 13.3 oz), last menstrual period 2022.  TWG 3#    History of  section    Multigravida of advanced maternal age in first trimester  -     Connected MOM Enrollment  -     Assign Connected MOM Program Consent Questionnaire  -     US MFM Procedure ([facility]); Future; Expected date: 04/10/2023       No cramping or bleeding.  Not yet feeling fetal movement.  Labs reviewed and up to date.   calculated success rate is 59%. Per ACOG guidelines, TOLAC should be recommended for calculated rates >60% and that these women have the same or less maternal morbidity than women who have an elective repeat CD. After discussing R/B/A with patient including but not limited to uterine rupture (0.5-0.9%) patient is amenable to this plan. We extensively discussed TOLAC vs scheduled repeat  vs trial of labor with need for repeat  and the risks associated with each. We discussed rates of  success have been showed to be higher if she goes into spontaneous labor on her own but that induction of labor is not contraindicated and would occur with cervical ripening balloon and pitocin if necessary. Per large research trial: rate of successful  was higher amoung women undergoing IOL at 39 weeks compared with expectant management. I discussed that I recommend neuroaxial anesthesia during labor in the event emergency CD is required.  Patient is considering her options and will continue to readdress as she gets closer to EDC   Mat21 NEG. AFP at next visit  Anatomy US ordered  Conn mom d/w patient    Pain and bleeding precautions given  Follow-up: 4 weeks

## 2023-03-14 ENCOUNTER — INITIAL PRENATAL (OUTPATIENT)
Dept: OBSTETRICS AND GYNECOLOGY | Facility: CLINIC | Age: 38
End: 2023-03-14
Payer: COMMERCIAL

## 2023-03-14 ENCOUNTER — PATIENT MESSAGE (OUTPATIENT)
Dept: ADMINISTRATIVE | Facility: OTHER | Age: 38
End: 2023-03-14
Payer: COMMERCIAL

## 2023-03-14 VITALS
DIASTOLIC BLOOD PRESSURE: 62 MMHG | SYSTOLIC BLOOD PRESSURE: 106 MMHG | WEIGHT: 120.38 LBS | BODY MASS INDEX: 20.66 KG/M2

## 2023-03-14 DIAGNOSIS — Z98.891 HISTORY OF LOW TRANSVERSE CESAREAN SECTION: ICD-10-CM

## 2023-03-14 DIAGNOSIS — O09.529 ANTEPARTUM MULTIGRAVIDA OF ADVANCED MATERNAL AGE: ICD-10-CM

## 2023-03-14 DIAGNOSIS — Z3A.13 13 WEEKS GESTATION OF PREGNANCY: Primary | ICD-10-CM

## 2023-03-14 PROBLEM — Z34.90 PREGNANCY: Status: ACTIVE | Noted: 2023-03-14

## 2023-03-14 PROCEDURE — 0500F PR INITIAL PRENATAL CARE VISIT: ICD-10-PCS | Mod: CPTII,S$GLB,, | Performed by: ADVANCED PRACTICE MIDWIFE

## 2023-03-14 PROCEDURE — 0500F INITIAL PRENATAL CARE VISIT: CPT | Mod: CPTII,S$GLB,, | Performed by: ADVANCED PRACTICE MIDWIFE

## 2023-03-14 PROCEDURE — 99999 PR PBB SHADOW E&M-EST. PATIENT-LVL II: CPT | Mod: PBBFAC,,, | Performed by: ADVANCED PRACTICE MIDWIFE

## 2023-03-14 PROCEDURE — 99999 PR PBB SHADOW E&M-EST. PATIENT-LVL II: ICD-10-PCS | Mod: PBBFAC,,, | Performed by: ADVANCED PRACTICE MIDWIFE

## 2023-03-14 NOTE — PROGRESS NOTES
38 y.o.,  at 13w0d by dating ultrasound    Patient presents today for a transfer initial prenatal visit. Patient reports they have attended initial prenatal visit with Dr. Jesus. Initial labs, dating ultrasounds, and dating done.     Concerns today: None.  Doing well overall. Discussed  score. Questions answered regarding prenatal care and management if planning TOLAC vs. R C/S.     SOCIAL HISTORY: Denies emotional/mental/physical/sexual violence or abuse. Feels safe at home. Is with father of baby, Ryan. Second baby for both.       Patient reports prepregnancy weight: 116 lbs. TW lbs     Patient reports most recent pap smear: 2022, results: NILM    ROS  OBSTETRICS:   Contractions No   Bleeding No   Loss of fluid No   Fetal mvmnt:   Feeling flutters     GASTRO:   Nausea No   Vomiting No      OB History    Para Term  AB Living   3 1 1   1 1   SAB IAB Ectopic Multiple Live Births     1   0 1      # Outcome Date GA Lbr Rayo/2nd Weight Sex Delivery Anes PTL Lv   3 Current            2 Term 21 41w3d  3.56 kg (7 lb 13.6 oz) F CS-LTranv EPI N JAMIE      Complications: Chorioamnionitis   1 IAB 07/12/10 6w0d             Obstetric Comments   Menarche age 12       Dating reviewed  Allergies and problem list reviewed and updated  Medical and surgical history reviewed    PHYSICAL EXAM  LMP 2022     FHR: 155     GENERAL: No acute distress  HEENT: Normocephalic, atruamatic  NEURO: Alert and oriented x3  PSYCH: Calm, normal mood and affect  PULMONARY: Non-labored respiration; no tachypnea  ABD: Soft, gravid, nontender  Fundus= between PS and umbilicus     ASSESSMENT AND PLAN     Problems (from 03/10/23 to present)       No problems associated with this episode.            Patient was oriented to practice and progression of routine prenatal care.   Reviewed New OB Pregnancy packet & questions answered. After hours contact info given.  Initial labs and dating u/s reviewed.      Discussed diet, along with substances & foods to avoid in pregnancy (i.e. sushi, fish that are high in mercury, deli meat, and unpasteurized cheeses).   Discussed prenatal vitamin options (i.e. stool softener, DHA).      Review exercise precautions in pregnancy, along with recommendations. Patient reports does exercise regularly. Swims laps twice per week.     Counseled today on proper weight gain based on the [employer]'s recommendations based on pre-pregnancy weight.  BMI 19.9 (prepregnancy)  -- Discussed IOM recommended weight gain of:   Weight category Pre pre BMI  Recommended TWG   Underweight Less than 18.5  28-40    Normal Weight 18.5-24.9  25-35    Overweight 25-29.9  15-25    Obese   30 and greater  11-20     Vaccines:    - Has received Influenza vaccine for this season.   - Has received Covid-19 vaccine. Needs booster - will schedule on candida. Education regarding vaccine recommendations in pregnancy.   - Education regarding CDC recommendations for TDAP in pregnancy, reviewed risks/benefits to this. Patient .    [id number] done. Desires AFP. Discussed lab draw at next appointment or after 15 weeks.   Education regarding warning signs.      Follow up: 6 wks, call or present sooner prn.     GINGER Elliott

## 2023-03-14 NOTE — PROGRESS NOTES
I have seen the patient, reviewed the Student Nurse Midwife's assessment and plan. I have personally interviewed and examined the patient at bedside and: agree with the findings.  Amarilis James CNM

## 2023-03-15 ENCOUNTER — PATIENT MESSAGE (OUTPATIENT)
Dept: OBSTETRICS AND GYNECOLOGY | Facility: CLINIC | Age: 38
End: 2023-03-15
Payer: COMMERCIAL

## 2023-03-15 ENCOUNTER — PATIENT MESSAGE (OUTPATIENT)
Dept: HEPATOLOGY | Facility: CLINIC | Age: 38
End: 2023-03-15
Payer: COMMERCIAL

## 2023-03-16 ENCOUNTER — PATIENT MESSAGE (OUTPATIENT)
Dept: MATERNAL FETAL MEDICINE | Facility: CLINIC | Age: 38
End: 2023-03-16
Payer: COMMERCIAL

## 2023-03-23 ENCOUNTER — PATIENT MESSAGE (OUTPATIENT)
Dept: FAMILY MEDICINE | Facility: CLINIC | Age: 38
End: 2023-03-23
Payer: COMMERCIAL

## 2023-03-24 RX ORDER — ESCITALOPRAM OXALATE 5 MG/1
5 TABLET ORAL DAILY
Qty: 30 TABLET | Refills: 2 | Status: SHIPPED | OUTPATIENT
Start: 2023-03-24 | End: 2023-07-10 | Stop reason: SDUPTHER

## 2023-03-30 ENCOUNTER — PATIENT MESSAGE (OUTPATIENT)
Dept: OBSTETRICS AND GYNECOLOGY | Facility: CLINIC | Age: 38
End: 2023-03-30
Payer: COMMERCIAL

## 2023-03-30 ENCOUNTER — CLINICAL SUPPORT (OUTPATIENT)
Dept: REHABILITATION | Facility: OTHER | Age: 38
End: 2023-03-30
Payer: COMMERCIAL

## 2023-03-30 DIAGNOSIS — M62.08 DIASTASIS OF RECTUS ABDOMINIS: ICD-10-CM

## 2023-03-30 DIAGNOSIS — M62.08 DIASTASIS RECTI: ICD-10-CM

## 2023-03-30 DIAGNOSIS — R27.9 LACK OF COORDINATION: ICD-10-CM

## 2023-03-30 PROCEDURE — 97530 THERAPEUTIC ACTIVITIES: CPT

## 2023-03-30 PROCEDURE — 97162 PT EVAL MOD COMPLEX 30 MIN: CPT

## 2023-03-30 NOTE — PATIENT INSTRUCTIONS
Initially it may be useful to practice squeezing at different layers of your pelvic floor muscles to help you find them, as each layer plays an important role in pelvic floor function. Eventually you want to be doing your pelvic floor muscle contractions to include squeezing at all 3 layers, this may become one smooth movement.    To isolate layer 1: think about closing your openings (around vagina and anus)     To isolate layer 2: imagine a drawstring in your urethra that you are pulling up inside     To isolate layer 3: pull your tailbone up towards your head    When putting it all together, it can be helpful to think about closing your openings and lifting up and in.      Kegels in Supine or sittin. Sit comfortably with legs and buttocks relaxed.  2. Squeeze and LIFT the muscles that stop the flow of urine and passage of gas.  Keep legs and buttock muscles quiet.  3. Hold lift for 10 seconds without holding breath.  4. Release and DROP for 10 seconds.  5. Repeat 10 times, 1 sets, 2-3 times per day.      No Kegels while urinating!        Quick Flicks    Contract pelvic floor muscles by closing around your openings and lifting up and in. Try not to use abdominal or buttocks muscles, and do not hold your breath. Relax completely after each contraction.     Repeat 15 times. Perform 2-3 sets/day.

## 2023-03-30 NOTE — PROGRESS NOTES
See plan of care for evaluation and treatment.     Thank you for the referral of this patient.

## 2023-04-04 ENCOUNTER — PATIENT MESSAGE (OUTPATIENT)
Dept: REHABILITATION | Facility: OTHER | Age: 38
End: 2023-04-04
Payer: COMMERCIAL

## 2023-04-04 ENCOUNTER — PATIENT MESSAGE (OUTPATIENT)
Dept: OTHER | Facility: OTHER | Age: 38
End: 2023-04-04
Payer: COMMERCIAL

## 2023-04-11 ENCOUNTER — PATIENT MESSAGE (OUTPATIENT)
Dept: OTHER | Facility: OTHER | Age: 38
End: 2023-04-11
Payer: COMMERCIAL

## 2023-04-15 ENCOUNTER — PATIENT MESSAGE (OUTPATIENT)
Dept: OBSTETRICS AND GYNECOLOGY | Facility: CLINIC | Age: 38
End: 2023-04-15
Payer: COMMERCIAL

## 2023-04-16 ENCOUNTER — TELEPHONE (OUTPATIENT)
Dept: OBSTETRICS AND GYNECOLOGY | Facility: HOSPITAL | Age: 38
End: 2023-04-16
Payer: COMMERCIAL

## 2023-04-16 NOTE — TELEPHONE ENCOUNTER
Spoke with pt who reports she is feeling better, still with some diarrhea but significant improvement since last night.

## 2023-04-18 ENCOUNTER — PATIENT MESSAGE (OUTPATIENT)
Dept: OBSTETRICS AND GYNECOLOGY | Facility: CLINIC | Age: 38
End: 2023-04-18
Payer: COMMERCIAL

## 2023-04-18 NOTE — PROGRESS NOTES
"  Pelvic Health Physical Therapy   Treatment Note     Name: Loren Levin [facility] Number: [id number]    Therapy Diagnosis:   Encounter Diagnoses   Name Primary?    Diastasis of rectus abdominis Yes    Lack of coordination      Physician: Amarilis James CNM    Visit Date: 4/19/2023    Physician Orders: PT Eval and Treat   Medical Diagnosis from Referral: M62.08 (ICD-10-CM) -Diastasis recti   Evaluation Date: 3/30/2023  Authorization Period Expiration: 12/29/2023   Plan of Care Expiration: 6/30/2023  Progress Note Due: 4/30/2023  Visit # / Visits authorized: 2/ 20  FOTO: None     Precautions: ROSE MARIE 9/19/2023, older daughter is 2-[26 months]     Time In: 9:00am  Time Out: 10:00am  Total Appointment Time (timed & untimed codes): 55 minutes  Subjective     Pt reports: Is having a difficult to perform exercises without doming, more aware of core, has done HEP about 3x since eval, but plans to do more frequently.       She was compliant with home exercise program.  Response to previous treatment:   Functional change:     Pain: /10  Location:   Objective     Loren received therapeutic exercises to develop  for 25 minutes including:     Bridge w/GTB x20,3"  Clam w/GTB x20  No money w/GB against wall x15,3"  Mid row seated on TB x20  Open books x10,3"  Cat cow x15  Supine hip flexor stretch b/l 1 min      Loren received the following manual therapy techniques: to develop for minutes including:       Loren participated in neuromuscular re-education activities to develop for 20 minutes including:   TrA training + coordination w/breathing  TrA with and w/o ball + SLR  BKFO w/YTB x10  Bird dog x5,3"          Loren participated in dynamic functional therapeutic activities to improve functional performance for 10  minutes, including:  Education provided on pressure management, proper timing of TrA activation only with bending, lifting, transfers, ergonomics, importance of stability and flexibility as she " "progresses    Home Exercises Provided and Patient Education Provided   Added 4/19/23  Bridge w/GTB x20,3"  Clam w/GTB x20  No money w/GB against wall x15,3"  Open book  Supine hip flexor stretch    From initial evaluation  Carmen  Nery ramírez      Education provided:   - posture/body mechanices, isometric abdominal exercises, proper bearing down techniques, and Coordination of kegels with functional activities such as cough, laugh, sneeze, lift, etc.   Discussed progression of plan of care with patient; educated pt in activity modification; reviewed HEP with pt. Pt demonstrated and verbalized understanding of all instruction and was provided with a handout of HEP (see Patient Instructions).      Written Home Exercises Provided: Patient instructed to cont prior HEP.  Exercises were reviewed and Loren was able to demonstrate them prior to the end of the session.  Loren demonstrated good  understanding of the education provided.     See EMR under Patient Instructions for exercises provided prior visit.    Assessment   Patient returns to PT without pain, but concerned about doming with exercises. Able to perform BKFO with less doming. Extensive education on proper core engagement with breathing coordination. Initiated lumbopelvic and periscapular stabilization exercises to address weaknesses and hypermobility and added these ex for HEP. Max vc/tc required to decrease rib flaring and lordosis with no money, able to perform correctly when using wall as a tactile cue. Will continue to progress as tolerated.         Loren Is progressing well towards her goals.   Pt prognosis is Excellent.     Pt will continue to benefit from skilled outpatient physical therapy to address the deficits listed in the problem list box on initial evaluation, provide pt/family education and to maximize pt's level of independence in the home and community environment.     Pt's spiritual, cultural and educational needs considered and pt " agreeable to plan of care and goals.     Anticipated barriers to physical therapy: growing baby/abdomen affecting the diastasis    Goals: Short Term Goals: 6 weeks      Pt to increase pelvic floor strength to at least 3/5 needed for pelvic support with ADLs and social activities.  Pt to demonstrate being able to correctly and consistently perform a kegel which is needed for  Pt to be able to perform a 7 second kegel x 10 reps to demonstrate improving strength and endurance.  Pt to demonstrate a decrease in her diastasis recti to 1 finger widths or less to demonstrate improving abdominal wall support and coordination for improved ADL participation.           Long Term Goals: 12 weeks      Pt to be discharged with home plan for carry over after discharge.   Pt to be able to perform a 10 second kegel x 10 reps to demonstrate improving strength and endurance.  Pt will be trained and compliant with postural strategies in sitting and standing to improve alignment and decrease pain and muscle fatigue  Pt to increase strength by 1 grade to improve lumbopelvic stability needed to decrease pain with ADLs.  Pt to demonstrate proper body mechanics with lifting to decrease strain on lumbopelvic and abdominal structures with ADLs.     Plan   Plan of care Certification: 3/30/2023 to 6/30/2023.     Outpatient Physical Therapy 1 time(s) every 2-3 week(s) for 10-12 weeks to include the following interventions: Electrical Stimulation SEMG, Manual Therapy, Moist Heat/ Ice, Neuromuscular Re-ed, Patient Education, Self Care, Therapeutic Activities, Therapeutic Exercise, and Ultrasound.     Patricia Gomez, PTA

## 2023-04-19 ENCOUNTER — CLINICAL SUPPORT (OUTPATIENT)
Dept: REHABILITATION | Facility: OTHER | Age: 38
End: 2023-04-19
Payer: COMMERCIAL

## 2023-04-19 DIAGNOSIS — R27.9 LACK OF COORDINATION: ICD-10-CM

## 2023-04-19 DIAGNOSIS — M62.08 DIASTASIS OF RECTUS ABDOMINIS: Primary | ICD-10-CM

## 2023-04-19 PROCEDURE — 97112 NEUROMUSCULAR REEDUCATION: CPT | Mod: CQ

## 2023-04-19 PROCEDURE — 97530 THERAPEUTIC ACTIVITIES: CPT | Mod: CQ

## 2023-04-19 PROCEDURE — 97110 THERAPEUTIC EXERCISES: CPT | Mod: CQ

## 2023-04-26 ENCOUNTER — PATIENT MESSAGE (OUTPATIENT)
Dept: OBSTETRICS AND GYNECOLOGY | Facility: CLINIC | Age: 38
End: 2023-04-26
Payer: COMMERCIAL

## 2023-04-27 ENCOUNTER — OFFICE VISIT (OUTPATIENT)
Dept: OBSTETRICS AND GYNECOLOGY | Facility: CLINIC | Age: 38
End: 2023-04-27
Payer: COMMERCIAL

## 2023-04-27 DIAGNOSIS — Z3A.19 19 WEEKS GESTATION OF PREGNANCY: ICD-10-CM

## 2023-04-27 DIAGNOSIS — O98.512 COVID-19 AFFECTING PREGNANCY IN SECOND TRIMESTER: ICD-10-CM

## 2023-04-27 DIAGNOSIS — O09.529 ANTEPARTUM MULTIGRAVIDA OF ADVANCED MATERNAL AGE: Primary | ICD-10-CM

## 2023-04-27 DIAGNOSIS — U07.1 COVID-19 AFFECTING PREGNANCY IN SECOND TRIMESTER: ICD-10-CM

## 2023-04-27 PROCEDURE — 0502F SUBSEQUENT PRENATAL CARE: CPT | Mod: 95,,, | Performed by: ADVANCED PRACTICE MIDWIFE

## 2023-04-27 PROCEDURE — 0502F PR SUBSEQUENT PRENATAL CARE: ICD-10-PCS | Mod: 95,,, | Performed by: ADVANCED PRACTICE MIDWIFE

## 2023-04-27 NOTE — PROGRESS NOTES
38 y.o., at 19w2d by Estimated Date of Delivery: 23    Presents for a virtual visit using secure audiovisual connection. The patient location is [place].    Complaints today: none. Currently at home recovering from Covid-19. - Doing well.    ROS  OBSTETRICS:   Contractions No   Bleeding No   Loss of fluid No   Fetal mvmnt present  GASTRO:   Nausea No   Vomiting No      OB History    Para Term  AB Living   3 1 1   1 1   SAB IAB Ectopic Multiple Live Births     1   0 1      # Outcome Date GA Lbr Rayo/2nd Weight Sex Delivery Anes PTL Lv   3 Current            2 Term 21 41w3d  3.56 kg (7 lb 13.6 oz) F CS-LTranv EPI N JAMIE      Complications: Chorioamnionitis   1 IAB 07/12/10 6w0d             Obstetric Comments   Menarche age 12       Dating reviewed  Allergies and problem list reviewed and updated  Medical and surgical history reviewed  Prenatal labs reviewed and updated    PHYSICAL EXAM  LMP 2022     GENERAL: No acute distress  HEENT: Normocephalic, atraumatic  NEURO: Alert and oriented x3  PSYCH: Normal mood and affect  PULMONARY: Non-labored respiration; no tachypnea      ASSESSMENT AND PLAN     Problems (from 03/10/23 to present)       Problem Noted Resolved    History of low transverse  section - considering TOLAC 3/14/2023 by Amarilis James CNM No    Overview Signed 3/14/2023  6:11 PM by Amarilis James CNM     C/S done here for failure to progress, chorio, and subsequent NRFHT's (dilated to complete and pushed - OP)  TOLAC calc 59%  [ ]Approval for          Pregnancy 3/14/2023 by Amarilis James CNM No    Overview Signed 3/14/2023  6:13 PM by Amarilis James CNM     Connected Mom:   Prepregnancy BMI: 19   Pap: 2022 - NILM; HR HPV neg  Dating - per 9w sono  U/S -  Aneuploidy screening - [id number] - neg  [ ]AFP  Blood type: O POS.  GDM screen -   Vaccines -   [x]Flu  [x]Covid-19 x3  [ ]TDAP  Contraception -  Peds -   Circ -   GBS  [ ]Consents          Antepartum multigravida of advanced maternal age 3/14/2023 by Amarilis James CNM No    Overview Signed 3/14/2023  6:14 PM by Amarilis James CNM     [x][id number] - negative  [ ]32w growth sono                   Reviewed anatomy ultrasound - pending, rescheduled  Reviewed exercise/diet recommendations in pregnancy.  Encouraged prenatal education classes - schedule given.  Education regarding  labor warning s/s.    Reviewed warning signs, normal FM, and how/when to call.    Follow-up: 4 weeks, call or present sooner PRN          Visit type: audiovisual          Face to Face time with patient: 20 min  20 minutes of total time spent on the encounter, which includes face to face time and non-face to face time preparing to see the patient (eg, review of tests), Obtaining and/or reviewing separately obtained history, Documenting clinical information in the electronic or other health record, Independently interpreting results (not separately reported) and communicating results to the patient/family/caregiver, or Care coordination (not separately reported).     Each patient to whom he or she provides medical services by telemedicine is:  (1) informed of the relationship between the physician and patient and the respective role of any other health care provider with respect to management of the patient; and (2) notified that he or she may decline to receive medical services by telemedicine and may withdraw from such care at any time.

## 2023-05-01 NOTE — PROGRESS NOTES
"  Pelvic Health Physical Therapy   Treatment Note     Name: Loren Levin [place]  Clinic Number: [id number]    Therapy Diagnosis:   Encounter Diagnoses   Name Primary?    Diastasis of rectus abdominis Yes    Lack of coordination        Physician: Amarilis James CNM    Visit Date: 5/3/2023    Physician Orders: PT Eval and Treat   Medical Diagnosis from Referral: M62.08 (ICD-10-CM) -Diastasis recti   Evaluation Date: 3/30/2023  Authorization Period Expiration: 12/29/2023   Plan of Care Expiration: 6/30/2023  Progress Note Due: 4/30/2023  Visit # / Visits authorized: 3/ 20  FOTO: None     Precautions: ROSE MARIE 9/19/2023, older daughter is 2-[26 months]     Time In: 8:05am  Time Out: 9:00am  Total Appointment Time (timed & untimed codes): 55 minutes  Subjective     Pt reports: Feeling the separation when lifting daughter into car seat.        She was compliant with home exercise program.  Response to previous treatment:   Functional change:     Pain: /10    Location:   Objective     Loren received therapeutic exercises to develop  for 5 minutes including:     Bridge w/GTB x20,3"  Clam w/GTB x20  No money w/RB against wall x15,3"  Mid row seated on TB x20  Open books x10,3"  Cat cow x15  Supine hip flexor stretch b/l 1 min      Loren received the following manual therapy techniques: to develop for 5 minutes including:   Kinesiotaping over Omar for awareness    Loren participated in neuromuscular re-education activities to develop for 25 minutes including:   RUSI for transverse abdominus training and bladder for diaphragmatic breathing  TrA training + coordination w/breathing (use of ball helpful before other exercises)  TrA with and w/o ball + SLR  TrA + marching  BKFO  x10  Bird dog x5,3"      Loren participated in dynamic functional therapeutic activities to improve functional performance for 20  minutes, including:  Education provided on pressure management, proper timing of TrA activation only with bending, " "lifting, transfers, ergonomics, importance of stability and flexibility as she progresses  Lifting training with TrA + hip hinge to ensure good mechanics when lifting daughter into car    Home Exercises Provided and Patient Education Provided   Added 4/19/23  Bridge w/GTB x20,3"  Clam w/GTB x20  No money w/GB against wall x15,3"  Open book  Supine hip flexor stretch    From initial evaluation  Jamaal      Education provided:   - posture/body mechanices, isometric abdominal exercises, proper bearing down techniques, and Coordination of kegels with functional activities such as cough, laugh, sneeze, lift, etc.   Discussed progression of plan of care with patient; educated pt in activity modification; reviewed HEP with pt. Pt demonstrated and verbalized understanding of all instruction and was provided with a handout of HEP (see Patient Instructions).      Written Home Exercises Provided: Patient instructed to cont prior HEP.  Exercises were reviewed and Loren was able to demonstrate them prior to the end of the session.  Loren demonstrated good  understanding of the education provided.     See EMR under Patient Instructions for exercises provided prior visit.    Assessment   Initiated use of RUSI this visit for TrA training. Patient initially had the tendency to push abdominal muscles out, but able to engage without pressure after use of TB push down. Able to complete all exercises with good activation and without doming. Improved ability to perform no money exercise with less resistance, issued RTB for home use to address frequent lifting of daughter and hypermobility.  Will continue to progress as tolerated.         Loren Is progressing well towards her goals.   Pt prognosis is Excellent.     Pt will continue to benefit from skilled outpatient physical therapy to address the deficits listed in the problem list box on initial evaluation, provide pt/family education and to maximize pt's level of " independence in the home and community environment.     Pt's spiritual, cultural and educational needs considered and pt agreeable to plan of care and goals.     Anticipated barriers to physical therapy: growing baby/abdomen affecting the diastasis    Goals: Short Term Goals: 6 weeks      Pt to increase pelvic floor strength to at least 3/5 needed for pelvic support with ADLs and social activities.  Pt to demonstrate being able to correctly and consistently perform a kegel which is needed for  Pt to be able to perform a 7 second kegel x 10 reps to demonstrate improving strength and endurance.  Pt to demonstrate a decrease in her diastasis recti to 1 finger widths or less to demonstrate improving abdominal wall support and coordination for improved ADL participation.           Long Term Goals: 12 weeks      Pt to be discharged with home plan for carry over after discharge.   Pt to be able to perform a 10 second kegel x 10 reps to demonstrate improving strength and endurance.  Pt will be trained and compliant with postural strategies in sitting and standing to improve alignment and decrease pain and muscle fatigue  Pt to increase strength by 1 grade to improve lumbopelvic stability needed to decrease pain with ADLs.  Pt to demonstrate proper body mechanics with lifting to decrease strain on lumbopelvic and abdominal structures with ADLs.     Plan   Plan of care Certification: 3/30/2023 to 6/30/2023.     Outpatient Physical Therapy 1 time(s) every 2-3 week(s) for 10-12 weeks to include the following interventions: Electrical Stimulation SEMG, Manual Therapy, Moist Heat/ Ice, Neuromuscular Re-ed, Patient Education, Self Care, Therapeutic Activities, Therapeutic Exercise, and Ultrasound.     Patricia Gomez, PTA      normal (ped)...

## 2023-05-02 ENCOUNTER — PATIENT MESSAGE (OUTPATIENT)
Dept: OTHER | Facility: OTHER | Age: 38
End: 2023-05-02
Payer: COMMERCIAL

## 2023-05-02 ENCOUNTER — PATIENT MESSAGE (OUTPATIENT)
Dept: MATERNAL FETAL MEDICINE | Facility: CLINIC | Age: 38
End: 2023-05-02
Payer: COMMERCIAL

## 2023-05-03 ENCOUNTER — CLINICAL SUPPORT (OUTPATIENT)
Dept: REHABILITATION | Facility: OTHER | Age: 38
End: 2023-05-03
Payer: COMMERCIAL

## 2023-05-03 ENCOUNTER — LAB VISIT (OUTPATIENT)
Dept: LAB | Facility: OTHER | Age: 38
End: 2023-05-03
Attending: NURSE PRACTITIONER
Payer: COMMERCIAL

## 2023-05-03 ENCOUNTER — PROCEDURE VISIT (OUTPATIENT)
Dept: MATERNAL FETAL MEDICINE | Facility: CLINIC | Age: 38
End: 2023-05-03
Payer: COMMERCIAL

## 2023-05-03 ENCOUNTER — PATIENT MESSAGE (OUTPATIENT)
Dept: OBSTETRICS AND GYNECOLOGY | Facility: CLINIC | Age: 38
End: 2023-05-03
Payer: COMMERCIAL

## 2023-05-03 DIAGNOSIS — O09.521 MULTIGRAVIDA OF ADVANCED MATERNAL AGE IN FIRST TRIMESTER: ICD-10-CM

## 2023-05-03 DIAGNOSIS — Z36.2 ENCOUNTER FOR FOLLOW-UP ULTRASOUND OF FETAL ANATOMY: Primary | ICD-10-CM

## 2023-05-03 DIAGNOSIS — R74.8 ELEVATED LIVER ENZYMES: ICD-10-CM

## 2023-05-03 DIAGNOSIS — M62.08 DIASTASIS OF RECTUS ABDOMINIS: Primary | ICD-10-CM

## 2023-05-03 DIAGNOSIS — R27.9 LACK OF COORDINATION: ICD-10-CM

## 2023-05-03 LAB
ALBUMIN SERPL BCP-MCNC: 3.3 G/DL (ref 3.5–5.2)
ALP SERPL-CCNC: 57 U/L (ref 55–135)
ALT SERPL W/O P-5'-P-CCNC: 20 U/L (ref 10–44)
AST SERPL-CCNC: 52 U/L (ref 10–40)
BILIRUB DIRECT SERPL-MCNC: 0.1 MG/DL (ref 0.1–0.3)
BILIRUB SERPL-MCNC: 0.2 MG/DL (ref 0.1–1)
PROT SERPL-MCNC: 6.9 G/DL (ref 6–8.4)

## 2023-05-03 PROCEDURE — 97112 NEUROMUSCULAR REEDUCATION: CPT | Mod: CQ

## 2023-05-03 PROCEDURE — 76811 US MFM PROCEDURE (VIEWPOINT): ICD-10-PCS | Mod: S$GLB,,, | Performed by: OBSTETRICS & GYNECOLOGY

## 2023-05-03 PROCEDURE — 80076 HEPATIC FUNCTION PANEL: CPT | Performed by: NURSE PRACTITIONER

## 2023-05-03 PROCEDURE — 36415 COLL VENOUS BLD VENIPUNCTURE: CPT | Performed by: NURSE PRACTITIONER

## 2023-05-03 PROCEDURE — 97530 THERAPEUTIC ACTIVITIES: CPT | Mod: CQ

## 2023-05-03 PROCEDURE — 76811 OB US DETAILED SNGL FETUS: CPT | Mod: S$GLB,,, | Performed by: OBSTETRICS & GYNECOLOGY

## 2023-05-05 ENCOUNTER — PATIENT MESSAGE (OUTPATIENT)
Dept: HEPATOLOGY | Facility: CLINIC | Age: 38
End: 2023-05-05
Payer: COMMERCIAL

## 2023-05-08 ENCOUNTER — TELEPHONE (OUTPATIENT)
Dept: OBSTETRICS AND GYNECOLOGY | Facility: CLINIC | Age: 38
End: 2023-05-08
Payer: COMMERCIAL

## 2023-05-09 ENCOUNTER — TELEPHONE (OUTPATIENT)
Dept: OBSTETRICS AND GYNECOLOGY | Facility: CLINIC | Age: 38
End: 2023-05-09
Payer: COMMERCIAL

## 2023-05-09 ENCOUNTER — PATIENT MESSAGE (OUTPATIENT)
Dept: OBSTETRICS AND GYNECOLOGY | Facility: CLINIC | Age: 38
End: 2023-05-09
Payer: COMMERCIAL

## 2023-05-09 PROBLEM — O44.02 PLACENTA PREVIA IN SECOND TRIMESTER: Status: ACTIVE | Noted: 2023-05-09

## 2023-05-09 NOTE — TELEPHONE ENCOUNTER
Spoke to patient about placenta previa on anatomy scan. Gave bleeding precautions, discussed that if she begins bleeding to seek emergency care immediately. Discussed pelvic rest and avoiding strenuous exercise and lifting. Discussed that her previa has a 70% chance of resolving. Discussed travel precautions, and ensuring that she has access to emergency services if she begins to experience bleeding. Patient verbalized understanding. All questions answered.

## 2023-05-10 ENCOUNTER — PATIENT MESSAGE (OUTPATIENT)
Dept: OBSTETRICS AND GYNECOLOGY | Facility: CLINIC | Age: 38
End: 2023-05-10
Payer: COMMERCIAL

## 2023-05-10 ENCOUNTER — PATIENT MESSAGE (OUTPATIENT)
Dept: REHABILITATION | Facility: OTHER | Age: 38
End: 2023-05-10
Payer: COMMERCIAL

## 2023-05-10 PROBLEM — U07.1 COVID-19 AFFECTING PREGNANCY IN SECOND TRIMESTER: Status: ACTIVE | Noted: 2023-05-10

## 2023-05-10 PROBLEM — O98.512 COVID-19 AFFECTING PREGNANCY IN SECOND TRIMESTER: Status: ACTIVE | Noted: 2023-05-10

## 2023-05-10 NOTE — TELEPHONE ENCOUNTER
Called pt - extensive conversation regarding Placenta Previa - reviewed risks, anticipated course of care/changes to monitoring, along with precautions and warning s/s.  Discussed travel in pregnancy, and reviewed precautions. Would recommend against international travel now with diagnosis of Placenta Previa. Pt has work trip scheduled to [place] - reviewed precautions and potential logistics/modifications, along with considerations, should bleeding occur (present to nearest emergency department - preferably at facility with OB/GYN care). She will continue to consider options.

## 2023-05-18 ENCOUNTER — CLINICAL SUPPORT (OUTPATIENT)
Dept: REHABILITATION | Facility: OTHER | Age: 38
End: 2023-05-18
Payer: COMMERCIAL

## 2023-05-18 DIAGNOSIS — R27.9 LACK OF COORDINATION: ICD-10-CM

## 2023-05-18 DIAGNOSIS — M62.08 DIASTASIS OF RECTUS ABDOMINIS: Primary | ICD-10-CM

## 2023-05-18 PROCEDURE — 97530 THERAPEUTIC ACTIVITIES: CPT

## 2023-05-18 PROCEDURE — 97110 THERAPEUTIC EXERCISES: CPT

## 2023-05-18 NOTE — PROGRESS NOTES
"  Pelvic Health Physical Therapy   Treatment Note     Name: Loren Levin [place]  Clinic Number: [id number]    Therapy Diagnosis:   Encounter Diagnoses   Name Primary?    Diastasis of rectus abdominis Yes    Lack of coordination          Physician: Amarilis James CNM    Visit Date: 5/18/2023    Physician Orders: PT Eval and Treat   Medical Diagnosis from Referral: M62.08 (ICD-10-CM) -Diastasis recti   Evaluation Date: 3/30/2023  Authorization Period Expiration: 12/29/2023   Plan of Care Expiration: 6/30/2023  Progress Note Due: 4/30/2023  Visit # / Visits authorized: 3/ 20  FOTO: None     Precautions: ROSE MARIE 9/19/2023, older daughter is 2-[26 months] ** On pelvic rest, nothing internal**     Time In: 10:35  Time Out: 11:30  Total Appointment Time (timed & untimed codes): 55 minutes  Subjective     Pt reports: Denies pain or discomfort at this time. She has been working on the techniques with lifting and picking up her daughter, it has been helping    She was compliant with home exercise program.  Response to previous treatment:   Functional change:     Pain: /10    Location:   Objective     Loren received therapeutic exercises to develop  for 45 minutes including:     TA isometric with ball x10  TA isometric with ball + kegel  TrA +Marching x10  TrA+SLR x10  TrA + SLR and RTB pull down x10    Bridge w/GTB x20,3"  Clam w/GTB x20    Sitting TA isometric with ball 10x2  Standing palloff press    Seated on P Ball, Straight arm pull down with GTB 2x10  Seated on P ball, Lat pull down GTB 2x10    No money w/RB against wall x15,3"  Mid row seated on TB x20  Open books x10,3"  Cat cow x15  Supine hip flexor stretch b/l 1 min      Loren received the following manual therapy techniques: to develop for 5 minutes including:   Kinesiotaping over Omar for awareness    Loren participated in neuromuscular re-education activities to develop for 00 minutes including:   RUSI for transverse abdominus training and bladder for " "diaphragmatic breathing  TrA training + coordination w/breathing (use of ball helpful before other exercises)  TrA with and w/o ball + SLR  TrA + marching  BKFO  x10  Bird dog x5,3"      Loren participated in dynamic functional therapeutic activities to improve functional performance for 10  minutes, including:    Benefits of use of Ktape for activation of muscles  Activating TA when completing transitional movements  How pelvic floor and TrA work together via fascia      Home Exercises Provided and Patient Education Provided   Added 4/19/23  Bridge w/GTB x20,3"  Clam w/GTB x20  No money w/GB against wall x15,3"  Open book  Supine hip flexor stretch    From initial evaluation  Jamaal      Education provided:   - posture/body mechanices, isometric abdominal exercises, proper bearing down techniques, and Coordination of kegels with functional activities such as cough, laugh, sneeze, lift, etc.   Discussed progression of plan of care with patient; educated pt in activity modification; reviewed HEP with pt. Pt demonstrated and verbalized understanding of all instruction and was provided with a handout of HEP (see Patient Instructions).      Written Home Exercises Provided: Patient instructed to cont prior HEP.  Exercises were reviewed and Loren was able to demonstrate them prior to the end of the session.  Loren demonstrated good  understanding of the education provided.     See EMR under Patient Instructions for exercises provided prior visit.    Assessment     Loren presents to therapy without complaints of pain and states the exercises feel like they have been helping and she has used the techniques from last visit when lifting her daughter and thy have been helping as well. She was recently diagnosed with placenta previa and is on pelvic rest (no internal. Discussed thing to avoid such as deep squatting or knees to chest, completing exercises in a supported position and breathing/avoid bearing " down. Resumed TA exercises, added kegel and over head activities as well as TrA activation in sitting and standing. Pt required min/mod cues for execution of exercises with moderate challenge. Plan to resume with strengheing and use biofeedback for pelvic floor endurance.        Loren Is progressing well towards her goals.   Pt prognosis is Excellent.     Pt will continue to benefit from skilled outpatient physical therapy to address the deficits listed in the problem list box on initial evaluation, provide pt/family education and to maximize pt's level of independence in the home and community environment.     Pt's spiritual, cultural and educational needs considered and pt agreeable to plan of care and goals.     Anticipated barriers to physical therapy: growing baby/abdomen affecting the diastasis    Goals: Short Term Goals: 6 weeks      Pt to increase pelvic floor strength to at least 3/5 needed for pelvic support with ADLs and social activities. (Unable to assess cue to pelvic rest)  Pt to demonstrate being able to correctly and consistently perform a kegel which is needed for  Pt to be able to perform a 7 second kegel x 10 reps to demonstrate improving strength and endurance.  Pt to demonstrate a decrease in her diastasis recti to 1 finger widths or less to demonstrate improving abdominal wall support and coordination for improved ADL participation. (Not met)           Long Term Goals: 12 weeks      Pt to be discharged with home plan for carry over after discharge.   Pt to be able to perform a 10 second kegel x 10 reps to demonstrate improving strength and endurance.  Pt will be trained and compliant with postural strategies in sitting and standing to improve alignment and decrease pain and muscle fatigue  Pt to increase strength by 1 grade to improve lumbopelvic stability needed to decrease pain with ADLs.  Pt to demonstrate proper body mechanics with lifting to decrease strain on lumbopelvic and abdominal  structures with ADLs.     Plan   Plan of care Certification: 3/30/2023 to 6/30/2023.     Outpatient Physical Therapy 1 time(s) every 2-3 week(s) for 10-12 weeks to include the following interventions: Electrical Stimulation SEMG, Manual Therapy, Moist Heat/ Ice, Neuromuscular Re-ed, Patient Education, Self Care, Therapeutic Activities, Therapeutic Exercise, and Ultrasound.     Kirsten Wilde, PT

## 2023-05-25 ENCOUNTER — PATIENT MESSAGE (OUTPATIENT)
Dept: OBSTETRICS AND GYNECOLOGY | Facility: CLINIC | Age: 38
End: 2023-05-25
Payer: COMMERCIAL

## 2023-05-30 ENCOUNTER — PATIENT MESSAGE (OUTPATIENT)
Dept: OTHER | Facility: OTHER | Age: 38
End: 2023-05-30
Payer: COMMERCIAL

## 2023-05-30 NOTE — PROGRESS NOTES
"  Pelvic Health Physical Therapy   Treatment Note     Name: Loren Levin [facility] Number: [id number]    Therapy Diagnosis:   Encounter Diagnoses   Name Primary?    Diastasis of rectus abdominis Yes    Lack of coordination            Physician: Amarilis James CNM    Visit Date: 5/31/2023    Physician Orders: PT Eval and Treat   Medical Diagnosis from Referral: M62.08 (ICD-10-CM) -Diastasis recti   Evaluation Date: 3/30/2023  Authorization Period Expiration: 12/29/2023   Plan of Care Expiration: 6/30/2023  Progress Note Due: 4/30/2023  Visit # / Visits authorized: 5/ 20  FOTO: None     Precautions: ROSE MARIE 9/19/2023, older daughter is 2-[26 months] ** On pelvic rest, nothing internal**     Time In: 9:10am  Time Out: 10:10am  Total Appointment Time (timed & untimed codes): 45 minutes  Subjective     Pt reports: Liked the exercises from last session but hasn't been able to practice because of time.       She was compliant with home exercise program.  Response to previous treatment:   Functional change:     Pain: /10    Location:   Objective     Loren received therapeutic exercises to develop  for 30 minutes including:     TA isometric with ball x10  TA isometric with ball + kegel  TrA +Marching x10  TrA+SLR x10  TrA + SLR and RTB pull down x10    Bridge w/GTB + hip abd x20,3"  Clam w/GTB x20    Sitting TA isometric with ball 10x2  Standing palloff press w/GTB 2x10  +Side steps w/GTB x3x10 steps  +STS onto airex w/GTB around knees 2x10      Seated on P Ball, Straight arm pull down with GTB 2x10  Seated on P ball, Lat pull down GTB 2x10    No money w/RB 2x10,3"  Mid row seated on TB x20  Open books x10,3"  Cat cow x15  Supine hip flexor stretch b/l 1 min      Loren received the following manual therapy techniques: to develop for 0 minutes including:   Kinesiotaping over Omar for awareness    Loren participated in neuromuscular re-education activities to develop for 15 minutes including:   SEMG EVALUATION:   PF " "Electrode placement: external perineal  Pt. Position: side lying left side     SEMG Finding:    Treatment for strength and endurance 5" work, 10" rest x20: 10-15 Uv 1-3 sec hold and slow relaxation              RUSI for transverse abdominus training and bladder for diaphragmatic breathing  TrA training + coordination w/breathing (use of ball helpful before other exercises)  TrA with and w/o ball + SLR  TrA + marching  BKFO  x10  Bird dog x5,3"      Loren participated in dynamic functional therapeutic activities to improve functional performance for 0  minutes, including:    Benefits of use of Ktape for activation of muscles  Activating TA when completing transitional movements  How pelvic floor and TrA work together via fascia      Home Exercises Provided and Patient Education Provided   Added 4/19/23  Bridge w/GTB x20,3"  Clam w/GTB x20  No money w/GB against wall x15,3"  Open book  Supine hip flexor stretch    From initial evaluation  Jamaal      Education provided:   - posture/body mechanices, isometric abdominal exercises, proper bearing down techniques, and Coordination of kegels with functional activities such as cough, laugh, sneeze, lift, etc.   Discussed progression of plan of care with patient; educated pt in activity modification; reviewed HEP with pt. Pt demonstrated and verbalized understanding of all instruction and was provided with a handout of HEP (see Patient Instructions).      Written Home Exercises Provided: Patient instructed to cont prior HEP.  Exercises were reviewed and Loren was able to demonstrate them prior to the end of the session.  Loren demonstrated good  understanding of the education provided.     See EMR under Patient Instructions for exercises provided prior visit.    Assessment     Loren demonstrated consistent strength, but would benefit from further PFM endurance. Appeared patient was co-lori abdominal muscles while performing PFM contraction but " requested to not have TrA palpated while performing reps so findings not entirely accurate. Educated on direct relationship between core/glute musculature and PFM with biofeedback. Continuation of strengthening and stabilization exercises with appropriate challenge.       Loren Is progressing well towards her goals.   Pt prognosis is Excellent.     Pt will continue to benefit from skilled outpatient physical therapy to address the deficits listed in the problem list box on initial evaluation, provide pt/family education and to maximize pt's level of independence in the home and community environment.     Pt's spiritual, cultural and educational needs considered and pt agreeable to plan of care and goals.     Anticipated barriers to physical therapy: growing baby/abdomen affecting the diastasis    Goals: Short Term Goals: 6 weeks      Pt to increase pelvic floor strength to at least 3/5 needed for pelvic support with ADLs and social activities. (Unable to assess cue to pelvic rest)  Pt to demonstrate being able to correctly and consistently perform a kegel which is needed for  Pt to be able to perform a 7 second kegel x 10 reps to demonstrate improving strength and endurance.  Pt to demonstrate a decrease in her diastasis recti to 1 finger widths or less to demonstrate improving abdominal wall support and coordination for improved ADL participation. (Not met)           Long Term Goals: 12 weeks      Pt to be discharged with home plan for carry over after discharge.   Pt to be able to perform a 10 second kegel x 10 reps to demonstrate improving strength and endurance.  Pt will be trained and compliant with postural strategies in sitting and standing to improve alignment and decrease pain and muscle fatigue  Pt to increase strength by 1 grade to improve lumbopelvic stability needed to decrease pain with ADLs.  Pt to demonstrate proper body mechanics with lifting to decrease strain on lumbopelvic and abdominal  structures with ADLs.     Plan   Plan of care Certification: 3/30/2023 to 6/30/2023.     Outpatient Physical Therapy 1 time(s) every 2-3 week(s) for 10-12 weeks to include the following interventions: Electrical Stimulation SEMG, Manual Therapy, Moist Heat/ Ice, Neuromuscular Re-ed, Patient Education, Self Care, Therapeutic Activities, Therapeutic Exercise, and Ultrasound.     Patricia Gomez, PTA

## 2023-05-31 ENCOUNTER — CLINICAL SUPPORT (OUTPATIENT)
Dept: REHABILITATION | Facility: OTHER | Age: 38
End: 2023-05-31
Attending: ADVANCED PRACTICE MIDWIFE
Payer: COMMERCIAL

## 2023-05-31 DIAGNOSIS — R27.9 LACK OF COORDINATION: ICD-10-CM

## 2023-05-31 DIAGNOSIS — M62.08 DIASTASIS OF RECTUS ABDOMINIS: Primary | ICD-10-CM

## 2023-05-31 PROCEDURE — 97110 THERAPEUTIC EXERCISES: CPT | Mod: CQ

## 2023-05-31 PROCEDURE — 97530 THERAPEUTIC ACTIVITIES: CPT | Mod: CQ

## 2023-05-31 PROCEDURE — 97112 NEUROMUSCULAR REEDUCATION: CPT | Mod: CQ

## 2023-06-01 ENCOUNTER — PROCEDURE VISIT (OUTPATIENT)
Dept: MATERNAL FETAL MEDICINE | Facility: CLINIC | Age: 38
End: 2023-06-01
Payer: COMMERCIAL

## 2023-06-01 ENCOUNTER — PATIENT MESSAGE (OUTPATIENT)
Dept: REHABILITATION | Facility: OTHER | Age: 38
End: 2023-06-01
Payer: COMMERCIAL

## 2023-06-01 ENCOUNTER — ROUTINE PRENATAL (OUTPATIENT)
Dept: OBSTETRICS AND GYNECOLOGY | Facility: CLINIC | Age: 38
End: 2023-06-01
Payer: COMMERCIAL

## 2023-06-01 VITALS
WEIGHT: 133.19 LBS | SYSTOLIC BLOOD PRESSURE: 110 MMHG | BODY MASS INDEX: 22.86 KG/M2 | DIASTOLIC BLOOD PRESSURE: 60 MMHG

## 2023-06-01 DIAGNOSIS — Z3A.28 28 WEEKS GESTATION OF PREGNANCY: Primary | ICD-10-CM

## 2023-06-01 DIAGNOSIS — O09.529 ANTEPARTUM MULTIGRAVIDA OF ADVANCED MATERNAL AGE: ICD-10-CM

## 2023-06-01 DIAGNOSIS — Z36.89 ENCOUNTER FOR ULTRASOUND TO ASSESS FETAL GROWTH: Primary | ICD-10-CM

## 2023-06-01 DIAGNOSIS — Z98.891 HISTORY OF LOW TRANSVERSE CESAREAN SECTION: ICD-10-CM

## 2023-06-01 DIAGNOSIS — Z36.2 ENCOUNTER FOR FOLLOW-UP ULTRASOUND OF FETAL ANATOMY: ICD-10-CM

## 2023-06-01 DIAGNOSIS — O44.02 PLACENTA PREVIA IN SECOND TRIMESTER: ICD-10-CM

## 2023-06-01 PROCEDURE — 76816 OB US FOLLOW-UP PER FETUS: CPT | Mod: S$GLB,,, | Performed by: OBSTETRICS & GYNECOLOGY

## 2023-06-01 PROCEDURE — 99999 PR PBB SHADOW E&M-EST. PATIENT-LVL II: ICD-10-PCS | Mod: PBBFAC,,,

## 2023-06-01 PROCEDURE — 0502F SUBSEQUENT PRENATAL CARE: CPT | Mod: CPTII,S$GLB,,

## 2023-06-01 PROCEDURE — 99999 PR PBB SHADOW E&M-EST. PATIENT-LVL II: CPT | Mod: PBBFAC,,,

## 2023-06-01 PROCEDURE — 0502F PR SUBSEQUENT PRENATAL CARE: ICD-10-PCS | Mod: CPTII,S$GLB,,

## 2023-06-01 PROCEDURE — 76816 US MFM PROCEDURE (VIEWPOINT): ICD-10-PCS | Mod: S$GLB,,, | Performed by: OBSTETRICS & GYNECOLOGY

## 2023-06-01 NOTE — PROGRESS NOTES
Chief Complaint   Patient presents with    Routine Prenatal Visit       38 y.o. female  at 24w2d, by Estimated Date of Delivery: 23    Complaints today: None. Doing well today. Had follow up US today -- awaiting results.     Reviewed TW lbs    ROS  OBSTETRICS:   Contractions No   Bleeding No   Loss of fluid No   Fetal mvmnt Yes  GASTRO:   Nausea No   Vomiting No      OB History    Para Term  AB Living   3 1 1   1 1   SAB IAB Ectopic Multiple Live Births     1   0 1      # Outcome Date GA Lbr Rayo/2nd Weight Sex Delivery Anes PTL Lv   3 Current            2 Term 21 41w3d  3.56 kg (7 lb 13.6 oz) F CS-LTranv EPI N JAMIE      Complications: Chorioamnionitis   1 IAB 07/12/10 6w0d             Obstetric Comments   Menarche age 12       Dating reviewed  Allergies and problem list reviewed and updated  Medical and surgical history reviewed  Prenatal labs reviewed and updated    PHYSICAL EXAM  /60   Wt 60.4 kg (133 lb 2.5 oz)   LMP 2022   BMI 22.86 kg/m²     GENERAL: No acute distress  HEENT: Normocephalic, atraumatic  NEURO: Alert and oriented x3  PSYCH: Normal mood and affect  PULMONARY: Non-labored respiration; no tachypnea  ABD: Soft, gravid, nontender      ASSESSMENT AND PLAN     Problems (from 03/10/23 to present)     Problem Noted Resolved    COVID-19 affecting pregnancy in second trimester 5/10/2023 by Amarilis James CNM No    Overview Addendum 5/10/2023 10:26 AM by Amarilis James CNM     2023 at 19w EGA         Placenta previa in second trimester 2023 by Maryellen Guerra CNM No    Overview Signed 2023  6:18 AM by Maryellen Guerra CNM     Seen on anatomy          History of low transverse  section - considering TOLAC 3/14/2023 by Amarilis James CNM No    Overview Signed 3/14/2023  6:11 PM by Amarilis James CNM     C/S done here for failure to progress, chorio, and subsequent NRFHT's (dilated to complete and pushed - OP)  TOLAC calc 59%  [  ]Approval for          Pregnancy 3/14/2023 by Amarilis James CNM No    Overview Signed 3/14/2023  6:13 PM by Amarilis James CNM     Connected Mom:   Prepregnancy BMI: 19   Pap: 2022 - NILM; HR HPV neg  Dating - per 9w sono  U/S -  Aneuploidy screening - [id number] - neg  [ ]AFP  Blood type: O POS.  GDM screen -   Vaccines -   [x]Flu  [x]Covid-19 x3  [ ]TDAP  Contraception -  Peds -   Circ -   GBS  [ ]Consents         Antepartum multigravida of advanced maternal age 3/14/2023 by Amarilis James CNM No    Overview Signed 3/14/2023  6:14 PM by Amarilis James CNM     [x][id number] - negative  [ ]32w growth sono                 Reviewed upcoming 28wk labs, anticipatory guidance provided, (O POS) and orders placed  Education regarding warning signs of PreEclampsia, reviewed normal FM,  labor precautions, and how/when to call.    Follow-up: 4 weeks, call or present sooner PRN    Pt VU and agrees with POC    Addendum  -- US report reviewed, placenta still previa. Will reevaluate at 32 week US.    Ching Juares CNM

## 2023-06-02 ENCOUNTER — PATIENT MESSAGE (OUTPATIENT)
Dept: OBSTETRICS AND GYNECOLOGY | Facility: CLINIC | Age: 38
End: 2023-06-02
Payer: COMMERCIAL

## 2023-06-05 ENCOUNTER — PATIENT MESSAGE (OUTPATIENT)
Dept: OBSTETRICS AND GYNECOLOGY | Facility: CLINIC | Age: 38
End: 2023-06-05
Payer: COMMERCIAL

## 2023-06-07 ENCOUNTER — TELEPHONE (OUTPATIENT)
Dept: OBSTETRICS AND GYNECOLOGY | Facility: CLINIC | Age: 38
End: 2023-06-07
Payer: COMMERCIAL

## 2023-06-07 NOTE — TELEPHONE ENCOUNTER
Spoke with patient regarding information about travel and placenta previa. Reviewed bleeding precautions and is aware that bleeding with a previa presents a medical emergency, so if her travel was necessary, she needs to have a plan for access to close medical care. Is aware to maintain pelvic rest. Will keep US appt for 32wk scan to see if previa is continuing to move.

## 2023-06-13 ENCOUNTER — PATIENT MESSAGE (OUTPATIENT)
Dept: OTHER | Facility: OTHER | Age: 38
End: 2023-06-13
Payer: COMMERCIAL

## 2023-06-15 ENCOUNTER — CLINICAL SUPPORT (OUTPATIENT)
Dept: REHABILITATION | Facility: OTHER | Age: 38
End: 2023-06-15
Payer: COMMERCIAL

## 2023-06-15 DIAGNOSIS — M62.89 PELVIC FLOOR DYSFUNCTION: Primary | ICD-10-CM

## 2023-06-15 DIAGNOSIS — M62.81 MUSCLE WEAKNESS: ICD-10-CM

## 2023-06-15 DIAGNOSIS — R27.8 OTHER LACK OF COORDINATION: ICD-10-CM

## 2023-06-15 PROCEDURE — 97110 THERAPEUTIC EXERCISES: CPT

## 2023-06-15 PROCEDURE — 97530 THERAPEUTIC ACTIVITIES: CPT

## 2023-06-15 NOTE — PROGRESS NOTES
"  Pelvic Health Physical Therapy   Treatment Note     Name: Loren Levin [place]  Clinic Number: [id number]    Therapy Diagnosis:   Encounter Diagnoses   Name Primary?    Pelvic floor dysfunction Yes    Other lack of coordination     Muscle weakness        Physician: Amarilis James CNM    Visit Date: 6/15/2023    Physician Orders: PT Eval and Treat   Medical Diagnosis from Referral: M62.08 (ICD-10-CM) -Diastasis recti   Evaluation Date: 3/30/2023  Authorization Period Expiration: 12/29/2023   Plan of Care Expiration: 6/30/2023  Progress Note Due: 4/30/2023  Visit # / Visits authorized: 5/ 20  FOTO: None     Precautions: ROSE MARIE 9/19/2023, older daughter is 2-[26 months] ** On pelvic rest, nothing internal**     Time In: 10:35  Time Out: 11:30  Total Appointment Time (timed & untimed codes): 55 minutes  Subjective     Pt reports: this week has been hard, she has been having pain on the R side and feeling tired. She has been swimming x2/week and the exercises. Denies pain at this time. She is feeling her lack of core more now that the baby is getting bigger but she has more awareness        She was compliant with home exercise program.  Response to previous treatment:   Functional change:     Pain: /10    Location:   Objective     Loren received therapeutic exercises to develop  for 40 minutes including:     MET  for L posterior innominate 5"x5    Bridge with ball x10  Bridge with mob belt x20  Clam w/GTB x20  +Reverse clam ytb 2x10    Sitting TA isometric with ball 10x2  Standing palloff press w/GTB x10  +Standing palloff press w/GTB x10 On foam  +Side steps w/GTB x3x10 steps  +STS onto airex w/GTB around knees 2x10    +Wall plank mountain climbers 3x30"  +Dead bug holds 20"x2  +Dead bug legs only 5x3    Seated on P Ball, Straight arm pull down with GTB 2x10  Seated on P ball, Lat pull down GTB 2x10    Open books x10,3"  Cat cow x15    Not performed 6/15/2023 :  Bridge w/GTB + hip abd x20,3"  No money w/RB " "2x10,3"  Mid row seated on TB x20  Supine hip flexor stretch b/l 1 min    TA isometric with ball x10  TA isometric with ball + kegel  TrA +Marching x10  TrA+SLR x10  TrA + SLR and RTB pull down x10      Loren received the following manual therapy techniques: to develop for 5 minutes including:   Kinesiotaping over Omar for awareness   4 strips: inferior horizontal for support, vertical at linea alba, 2 diagonal for approximation of abdominal muscles    Loren participated in neuromuscular re-education activities to develop for 00 minutes including:     SEMG EVALUATION:   PF Electrode placement: external perineal  Pt. Position: side lying left side     SEMG Finding:    Treatment for strength and endurance 5" work, 10" rest x20: 10-15 Uv 1-3 sec hold and slow relaxation      RUSI for transverse abdominus training and bladder for diaphragmatic breathing  TrA training + coordination w/breathing (use of ball helpful before other exercises)  TrA with and w/o ball + SLR  TrA + marching  BKFO  x10  Bird dog x5,3"      Loren participated in dynamic functional therapeutic activities to improve functional performance for 10  minutes, including:    SIJ mobility and muscle balance, how to preform MET  HEP build/review    Home Exercises Provided and Patient Education Provided   Added 6/15/2023  METs for L posterior innominate  Standing plank  Standing side plank  Standing mountain climbers      Education provided:   - posture/body mechanices, isometric abdominal exercises, proper bearing down techniques, and Coordination of kegels with functional activities such as cough, laugh, sneeze, lift, etc.   Discussed progression of plan of care with patient; educated pt in activity modification; reviewed HEP with pt. Pt demonstrated and verbalized understanding of all instruction and was provided with a handout of HEP (see Patient Instructions).      Written Home Exercises Provided: Patient instructed to cont prior HEP.  Exercises " were reviewed and Loren was able to demonstrate them prior to the end of the session.  Loren demonstrated good  understanding of the education provided.     See EMR under Patient Instructions for exercises provided prior visit.    Assessment     Loren presents to PT with reports of some R side low back (SIJ region) pain that happens intermittently. Pt had slight posterior rotated L innominate, completed METs with reports of feeling better after and even in standing. Resumed strengthen exercises to focus on transverse abdominal and pelvic girdle strengthening. She was able to complete exercises with some challenge. K taping to the abdominal region for support as described and exercises given for home.      Loren Is progressing well towards her goals.   Pt prognosis is Excellent.     Pt will continue to benefit from skilled outpatient physical therapy to address the deficits listed in the problem list box on initial evaluation, provide pt/family education and to maximize pt's level of independence in the home and community environment.     Pt's spiritual, cultural and educational needs considered and pt agreeable to plan of care and goals.     Anticipated barriers to physical therapy: growing baby/abdomen affecting the diastasis    Goals: Short Term Goals: 6 weeks      Pt to increase pelvic floor strength to at least 3/5 needed for pelvic support with ADLs and social activities. (Unable to assess cue to pelvic rest)  Pt to demonstrate being able to correctly and consistently perform a kegel which is needed for  Pt to be able to perform a 7 second kegel x 10 reps to demonstrate improving strength and endurance.  Pt to demonstrate a decrease in her diastasis recti to 1 finger widths or less to demonstrate improving abdominal wall support and coordination for improved ADL participation. (Not met)           Long Term Goals: 12 weeks      Pt to be discharged with home plan for carry over after discharge.   Pt to be  able to perform a 10 second kegel x 10 reps to demonstrate improving strength and endurance.  Pt will be trained and compliant with postural strategies in sitting and standing to improve alignment and decrease pain and muscle fatigue  Pt to increase strength by 1 grade to improve lumbopelvic stability needed to decrease pain with ADLs.  Pt to demonstrate proper body mechanics with lifting to decrease strain on lumbopelvic and abdominal structures with ADLs.     Plan   Plan of care Certification: 3/30/2023 to 6/30/2023.     Outpatient Physical Therapy 1 time(s) every 2-3 week(s) for 10-12 weeks to include the following interventions: Electrical Stimulation SEMG, Manual Therapy, Moist Heat/ Ice, Neuromuscular Re-ed, Patient Education, Self Care, Therapeutic Activities, Therapeutic Exercise, and Ultrasound.     Kirsten Wilde, PT

## 2023-06-17 ENCOUNTER — PATIENT MESSAGE (OUTPATIENT)
Dept: OBSTETRICS AND GYNECOLOGY | Facility: CLINIC | Age: 38
End: 2023-06-17
Payer: COMMERCIAL

## 2023-06-27 ENCOUNTER — PATIENT MESSAGE (OUTPATIENT)
Dept: OTHER | Facility: OTHER | Age: 38
End: 2023-06-27
Payer: COMMERCIAL

## 2023-06-28 ENCOUNTER — PATIENT MESSAGE (OUTPATIENT)
Dept: OBSTETRICS AND GYNECOLOGY | Facility: CLINIC | Age: 38
End: 2023-06-28
Payer: COMMERCIAL

## 2023-06-29 ENCOUNTER — LAB VISIT (OUTPATIENT)
Dept: LAB | Facility: OTHER | Age: 38
End: 2023-06-29
Payer: COMMERCIAL

## 2023-06-29 ENCOUNTER — PATIENT MESSAGE (OUTPATIENT)
Dept: OBSTETRICS AND GYNECOLOGY | Facility: CLINIC | Age: 38
End: 2023-06-29

## 2023-06-29 ENCOUNTER — CLINICAL SUPPORT (OUTPATIENT)
Dept: REHABILITATION | Facility: OTHER | Age: 38
End: 2023-06-29
Payer: COMMERCIAL

## 2023-06-29 ENCOUNTER — ROUTINE PRENATAL (OUTPATIENT)
Dept: OBSTETRICS AND GYNECOLOGY | Facility: CLINIC | Age: 38
End: 2023-06-29
Payer: COMMERCIAL

## 2023-06-29 VITALS
WEIGHT: 139.69 LBS | BODY MASS INDEX: 23.97 KG/M2 | SYSTOLIC BLOOD PRESSURE: 110 MMHG | DIASTOLIC BLOOD PRESSURE: 60 MMHG

## 2023-06-29 DIAGNOSIS — R73.09 ELEVATED GLUCOSE TOLERANCE TEST: Primary | ICD-10-CM

## 2023-06-29 DIAGNOSIS — Z3A.28 28 WEEKS GESTATION OF PREGNANCY: ICD-10-CM

## 2023-06-29 DIAGNOSIS — R27.8 OTHER LACK OF COORDINATION: ICD-10-CM

## 2023-06-29 DIAGNOSIS — Z98.891 HISTORY OF LOW TRANSVERSE CESAREAN SECTION: Primary | ICD-10-CM

## 2023-06-29 DIAGNOSIS — M62.81 MUSCLE WEAKNESS: ICD-10-CM

## 2023-06-29 DIAGNOSIS — M62.89 PELVIC FLOOR DYSFUNCTION: Primary | ICD-10-CM

## 2023-06-29 LAB
BASOPHILS # BLD AUTO: 0.02 K/UL (ref 0–0.2)
BASOPHILS NFR BLD: 0.2 % (ref 0–1.9)
DIFFERENTIAL METHOD: ABNORMAL
EOSINOPHIL # BLD AUTO: 0.1 K/UL (ref 0–0.5)
EOSINOPHIL NFR BLD: 1.1 % (ref 0–8)
ERYTHROCYTE [DISTWIDTH] IN BLOOD BY AUTOMATED COUNT: 12.7 % (ref 11.5–14.5)
GLUCOSE SERPL-MCNC: 161 MG/DL (ref 70–140)
HCT VFR BLD AUTO: 35.3 % (ref 37–48.5)
HGB BLD-MCNC: 12.3 G/DL (ref 12–16)
IMM GRANULOCYTES # BLD AUTO: 0.04 K/UL (ref 0–0.04)
IMM GRANULOCYTES NFR BLD AUTO: 0.4 % (ref 0–0.5)
LYMPHOCYTES # BLD AUTO: 1.7 K/UL (ref 1–4.8)
LYMPHOCYTES NFR BLD: 17.8 % (ref 18–48)
MCH RBC QN AUTO: 32.7 PG (ref 27–31)
MCHC RBC AUTO-ENTMCNC: 34.8 G/DL (ref 32–36)
MCV RBC AUTO: 94 FL (ref 82–98)
MONOCYTES # BLD AUTO: 0.6 K/UL (ref 0.3–1)
MONOCYTES NFR BLD: 6.1 % (ref 4–15)
NEUTROPHILS # BLD AUTO: 7 K/UL (ref 1.8–7.7)
NEUTROPHILS NFR BLD: 74.4 % (ref 38–73)
NRBC BLD-RTO: 0 /100 WBC
PLATELET # BLD AUTO: 157 K/UL (ref 150–450)
PMV BLD AUTO: 11 FL (ref 9.2–12.9)
RBC # BLD AUTO: 3.76 M/UL (ref 4–5.4)
WBC # BLD AUTO: 9.38 K/UL (ref 3.9–12.7)

## 2023-06-29 PROCEDURE — 0502F SUBSEQUENT PRENATAL CARE: CPT | Mod: CPTII,S$GLB,, | Performed by: ADVANCED PRACTICE MIDWIFE

## 2023-06-29 PROCEDURE — 97110 THERAPEUTIC EXERCISES: CPT

## 2023-06-29 PROCEDURE — 82950 GLUCOSE TEST: CPT

## 2023-06-29 PROCEDURE — 85025 COMPLETE CBC W/AUTO DIFF WBC: CPT

## 2023-06-29 PROCEDURE — 99999 PR PBB SHADOW E&M-EST. PATIENT-LVL II: CPT | Mod: PBBFAC,,, | Performed by: ADVANCED PRACTICE MIDWIFE

## 2023-06-29 PROCEDURE — 99999 PR PBB SHADOW E&M-EST. PATIENT-LVL II: ICD-10-PCS | Mod: PBBFAC,,, | Performed by: ADVANCED PRACTICE MIDWIFE

## 2023-06-29 PROCEDURE — 36415 COLL VENOUS BLD VENIPUNCTURE: CPT

## 2023-06-29 PROCEDURE — 0502F PR SUBSEQUENT PRENATAL CARE: ICD-10-PCS | Mod: CPTII,S$GLB,, | Performed by: ADVANCED PRACTICE MIDWIFE

## 2023-06-29 NOTE — PROGRESS NOTES
"  Pelvic Health Physical Therapy   Treatment Note     Name: Loren Levin [place]  Clinic Number: [id number]    Therapy Diagnosis:   Encounter Diagnoses   Name Primary?    Pelvic floor dysfunction Yes    Other lack of coordination     Muscle weakness        Physician: Amarilis James CNM    Visit Date: 6/29/2023    Physician Orders: PT Eval and Treat   Medical Diagnosis from Referral: M62.08 (ICD-10-CM) -Diastasis recti   Evaluation Date: 3/30/2023  Authorization Period Expiration: 12/29/2023   Plan of Care Expiration: 6/30/2023  Progress Note Due: 4/30/2023  Visit # / Visits authorized: 5/ 20  FOTO: None     Precautions: ROSE MARIE 9/19/2023, older daughter is 2-[26 months] ** On pelvic rest, nothing internal**     Time In: 10:35  Time Out: 11:30  Total Appointment Time (timed & untimed codes): 55 minutes  Subjective     Pt reports: The METs/Push pull is very helpful and her  does it every night.     this week has been hard, she has been having pain on the R side and feeling tired. She has been swimming x2/week and the exercises. Denies pain at this time. She is feeling her lack of core more now that the baby is getting bigger but she has more awareness        She was compliant with home exercise program.  Response to previous treatment:   Functional change:     Pain: /10    Location:   Objective     Loren received therapeutic exercises to develop  for 40 minutes including:       Bridge with mob belt x20  Clam w/GTB x20  Reverse clam ytb 2x10    Standing palloff press w/GTB x10 On foam  Side steps w/GTB x3x8 steps  +Monster walks F/B 3x10 steps  STS onto airex w/GTB around knees 2x10    Wall plank mountain climbers 3x30"  Supine TA isometric with ball 10x2  Hook Lying hip add isomeric x10  Dead bug holds 20"x3  Dead bug legs only 8x2  METs for anterior R/ posterior L innominate 5"x5    Seated on P Ball, Straight arm pull down with GTB 2x10  Seated on P ball, Lat pull down GTB 2x10    Open books x10,3"  Cat cow " "x15  Arianna pose    +Seated on ball pelvic tilts, lateral tills, CW,CCW 30" ea    Not performed 6/29/2023 :  Bridge w/GTB + hip abd x20,3"  No money w/RB 2x10,3"  Mid row seated on TB x20  Supine hip flexor stretch b/l 1 min    TA isometric with ball x10  TA isometric with ball + kegel  TrA +Marching x10  TrA+SLR x10  TrA + SLR and RTB pull down x10      Loren received the following manual therapy techniques: to develop for 5 minutes including:   Kinesiotaping over Omar for awareness   4 strips: inferior horizontal for support, vertical at linea alba, 2 diagonal for approximation of abdominal muscles    Loren participated in neuromuscular re-education activities to develop for 00 minutes including:     SEMG EVALUATION:   PF Electrode placement: external perineal  Pt. Position: side lying left side     SEMG Finding:    Treatment for strength and endurance 5" work, 10" rest x20: 10-15 Uv 1-3 sec hold and slow relaxation      RUSI for transverse abdominus training and bladder for diaphragmatic breathing  TrA training + coordination w/breathing (use of ball helpful before other exercises)  TrA with and w/o ball + SLR  TrA + marching  BKFO  x10  Bird dog x5,3"      Loren participated in dynamic functional therapeutic activities to improve functional performance for 10  minutes, including:    SIJ mobility and muscle balance, how to preform MET  HEP build/review    Home Exercises Provided and Patient Education Provided   Added 6/15/2023  METs for L posterior innominate  Standing plank  Standing side plank  Standing mountain climbers      Education provided:   - posture/body mechanices, isometric abdominal exercises, proper bearing down techniques, and Coordination of kegels with functional activities such as cough, laugh, sneeze, lift, etc.   Discussed progression of plan of care with patient; educated pt in activity modification; reviewed HEP with pt. Pt demonstrated and verbalized understanding of all instruction " and was provided with a handout of HEP (see Patient Instructions).      Written Home Exercises Provided: Patient instructed to cont prior HEP.  Exercises were reviewed and Loren was able to demonstrate them prior to the end of the session.  Loren demonstrated good  understanding of the education provided.     See EMR under Patient Instructions for exercises provided prior visit.    Assessment     Loren presents to PT with reports of feeling better with METs taught last visit and is having her  do it every night. Continued with stability exercises in supine and standing. Added mobility exercises while sitting on ball, pelvic mobility and pelvic tilts. She had some discomfort while laying on her back to do the dead bug exercise but got somewhat better after hip add isometrics. Plan for discharge a few weeks prior to baby. Plan to add more functional mobility exercises and pregnancy/delivery prep.      Loren Is progressing well towards her goals.   Pt prognosis is Excellent.     Pt will continue to benefit from skilled outpatient physical therapy to address the deficits listed in the problem list box on initial evaluation, provide pt/family education and to maximize pt's level of independence in the home and community environment.     Pt's spiritual, cultural and educational needs considered and pt agreeable to plan of care and goals.     Anticipated barriers to physical therapy: growing baby/abdomen affecting the diastasis    Goals: Short Term Goals: 6 weeks      Pt to increase pelvic floor strength to at least 3/5 needed for pelvic support with ADLs and social activities. (Unable to assess due to pelvic rest)  Pt to demonstrate being able to correctly and consistently perform a kegel which is needed  to increase pelvic floor muscle coordination and strength needed for continence. (Unable to assess due to pelvic rest)  Pt to be able to perform a 7 second kegel x 10 reps to demonstrate improving strength  and endurance. (Unable to assess due to pelvic rest)  Pt to demonstrate a decrease in her diastasis recti to 1 finger widths or less to demonstrate improving abdominal wall support and coordination for improved ADL participation. (Unable to assess due to growing abdomen)  Updated: Pt able to manage pelvic girdle pain with conservative treatment, (stretching and self mobilizations)       Long Term Goals: 12 weeks      Pt to be discharged with home plan for carry over after discharge.   Pt to be able to perform a 10 second kegel x 10 reps to demonstrate improving strength and endurance. (Unable to assess due to pelvic rest)  Pt will be trained and compliant with postural strategies in sitting and standing to improve alignment and decrease pain and muscle fatigue (progressing)  Pt to increase strength by 1 grade to improve lumbopelvic stability needed to decrease pain with ADLs. (progressing)  Pt to demonstrate proper body mechanics with lifting to decrease strain on lumbopelvic and abdominal structures with ADLs.  (progressing)    Plan   Plan of care Certification: 6/29/2023 to 8/30/2023.     Outpatient Physical Therapy 1 time(s) every 2-3 week(s) for 10-12 weeks to include the following interventions: Electrical Stimulation SEMG, Manual Therapy, Moist Heat/ Ice, Neuromuscular Re-ed, Patient Education, Self Care, Therapeutic Activities, Therapeutic Exercise, and Ultrasound.     Kirsten Wilde, PT

## 2023-06-29 NOTE — PROGRESS NOTES
38 y.o. female  at 28w2d by Estimated Date of Delivery: 23    Complaints today: none  Reviewed TWlbs    ROS  OBSTETRICS:   Contractions No   Bleeding No   Loss of fluid No   Fetal mvmnt Present  GASTRO:   Nausea No   Vomiting No      OB History    Para Term  AB Living   3 1 1   1 1   SAB IAB Ectopic Multiple Live Births     1   0 1      # Outcome Date GA Lbr Rayo/2nd Weight Sex Delivery Anes PTL Lv   3 Current            2 Term 21 41w3d  3.56 kg (7 lb 13.6 oz) F CS-LTranv EPI N JAMIE      Complications: Chorioamnionitis   1 IAB 07/12/10 6w0d             Obstetric Comments   Menarche age 12       Dating reviewed  Allergies and problem list reviewed and updated  Medical and surgical history reviewed  Prenatal labs reviewed and updated    PHYSICAL EXAM  /60   Wt 63.3 kg (139 lb 10.6 oz)   LMP 2022   BMI 23.97 kg/m²     GENERAL: No acute distress  HEENT: Normocephalic, atraumatic  NEURO: Alert and oriented x3  PSYCH: Normal mood and affect  PULMONARY: Non-labored respiration; no tachypnea  ABD: Soft, gravid, nontender.    ASSESSMENT AND PLAN     Problems (from 03/10/23 to present)     Problem Noted Resolved    COVID-19 affecting pregnancy in second trimester 5/10/2023 by Amarilis James CNM No    Overview Addendum 5/10/2023 10:26 AM by Amarilis James CNM     2023 at 19w EGA         Placenta previa in second trimester 2023 by Maryellen Guerra CNM No    Overview Signed 2023  6:18 AM by Maryellen Guerra CNM     Seen on anatomy          History of low transverse  section - considering TOLAC 3/14/2023 by Amarilis James CNM No    Overview Signed 3/14/2023  6:11 PM by Amarilis James CNM     C/S done here for failure to progress, chorio, and subsequent NRFHT's (dilated to complete and pushed - OP)  TOLAC calc 59%  [ ]Approval for          Pregnancy 3/14/2023 by Amarilis James CNM No    Overview Signed 3/14/2023  6:13 PM by Amarilis James CNM      Connected Mom:   Prepregnancy BMI: 19   Pap: 2022 - NILM; HR HPV neg  Dating - per 9w sono  U/S -  Aneuploidy screening - [id number] - neg  [ ]AFP  Blood type: O POS.  GDM screen -   Vaccines -   [x]Flu  [x]Covid-19 x3  [ ]TDAP  Contraception -  Peds -   Circ -   GBS  [ ]Consents         Antepartum multigravida of advanced maternal age 3/14/2023 by Amarilis James CNM No    Overview Signed 3/14/2023  6:14 PM by Amarilis James CNM     [x][id number] - negative  [ ]32w growth sono               Completing 28wk labs today.   Blood type: (O POS).  Education regarding CDC recommendations for Tdap in pregnancy. Patient declines today.    Reviewed warning signs, normal FKCs,  labor precautions and how/when to call.    Follow-up: 2 weeks, call or present sooner PRN.

## 2023-06-29 NOTE — PLAN OF CARE
Pelvic Health Physical Therapy   Treatment Note     Name: Loren Levin [place]  Clinic Number: [id number]    Therapy Diagnosis:   Encounter Diagnoses   Name Primary?    Pelvic floor dysfunction Yes    Other lack of coordination     Muscle weakness        Physician: Amarilis James CNM    Visit Date: 6/29/2023    Physician Orders: PT Eval and Treat   Medical Diagnosis from Referral: M62.08 (ICD-10-CM) -Diastasis recti   Evaluation Date: 3/30/2023  Authorization Period Expiration: 12/29/2023   Plan of Care Expiration: 6/30/2023  Progress Note Due: 4/30/2023  Visit # / Visits authorized: 5/ 20  FOTO: None     Precautions: ROSE MARIE 9/19/2023, older daughter is 2-[26 months] ** On pelvic rest, nothing internal**     Time In: 10:35  Time Out: 11:30  Total Appointment Time (timed & untimed codes): 55 minutes  Subjective     Pt reports: The METs/Push pull is very helpful and her  does it every night.     this week has been hard, she has been having pain on the R side and feeling tired. She has been swimming x2/week and the exercises. Denies pain at this time. She is feeling her lack of core more now that the baby is getting bigger but she has more awareness        She was compliant with home exercise program.  Response to previous treatment:   Functional change:     Pain: /10    Location:     Assessment     Loren presents to PT with reports of feeling better with METs taught last visit and is having her  do it every night. Continued with stability exercises in supine and standing. Added mobility exercises while sitting on ball, pelvic mobility and pelvic tilts. She had some discomfort while laying on her back to do the dead bug exercise but got somewhat better after hip add isometrics. Plan for discharge a few weeks prior to baby. Plan to add more functional mobility exercises and pregnancy/delivery prep.      Loren Is progressing well towards her goals.   Pt prognosis is Excellent.     Pt will continue to  benefit from skilled outpatient physical therapy to address the deficits listed in the problem list box on initial evaluation, provide pt/family education and to maximize pt's level of independence in the home and community environment.     Pt's spiritual, cultural and educational needs considered and pt agreeable to plan of care and goals.     Anticipated barriers to physical therapy: growing baby/abdomen affecting the diastasis    Goals: Short Term Goals: 6 weeks      Pt to increase pelvic floor strength to at least 3/5 needed for pelvic support with ADLs and social activities. (Unable to assess due to pelvic rest)  Pt to demonstrate being able to correctly and consistently perform a kegel which is needed  to increase pelvic floor muscle coordination and strength needed for continence. (Unable to assess due to pelvic rest)  Pt to be able to perform a 7 second kegel x 10 reps to demonstrate improving strength and endurance. (Unable to assess due to pelvic rest)  Pt to demonstrate a decrease in her diastasis recti to 1 finger widths or less to demonstrate improving abdominal wall support and coordination for improved ADL participation. (Unable to assess due to growing abdomen)  Updated: Pt able to manage pelvic girdle pain with conservative treatment, (stretching and self mobilizations)       Long Term Goals: 12 weeks      Pt to be discharged with home plan for carry over after discharge.   Pt to be able to perform a 10 second kegel x 10 reps to demonstrate improving strength and endurance. (Unable to assess due to pelvic rest)  Pt will be trained and compliant with postural strategies in sitting and standing to improve alignment and decrease pain and muscle fatigue (progressing)  Pt to increase strength by 1 grade to improve lumbopelvic stability needed to decrease pain with ADLs. (progressing)  Pt to demonstrate proper body mechanics with lifting to decrease strain on lumbopelvic and abdominal structures with  ADLs.  (progressing)    Plan   Plan of care Certification: 6/29/2023 to 8/30/2023.     Outpatient Physical Therapy 1 time(s) every 2-3 week(s) for 10-12 weeks to include the following interventions: Electrical Stimulation SEMG, Manual Therapy, Moist Heat/ Ice, Neuromuscular Re-ed, Patient Education, Self Care, Therapeutic Activities, Therapeutic Exercise, and Ultrasound.     Kirsten Wilde, PT

## 2023-06-30 ENCOUNTER — PATIENT MESSAGE (OUTPATIENT)
Dept: OBSTETRICS AND GYNECOLOGY | Facility: CLINIC | Age: 38
End: 2023-06-30
Payer: COMMERCIAL

## 2023-06-30 DIAGNOSIS — Z98.891 HISTORY OF LOW TRANSVERSE CESAREAN SECTION: Primary | ICD-10-CM

## 2023-07-05 ENCOUNTER — PATIENT MESSAGE (OUTPATIENT)
Dept: OBSTETRICS AND GYNECOLOGY | Facility: CLINIC | Age: 38
End: 2023-07-05
Payer: COMMERCIAL

## 2023-07-05 ENCOUNTER — LAB VISIT (OUTPATIENT)
Dept: LAB | Facility: OTHER | Age: 38
End: 2023-07-05
Attending: OBSTETRICS & GYNECOLOGY
Payer: COMMERCIAL

## 2023-07-05 DIAGNOSIS — R73.09 ELEVATED GLUCOSE TOLERANCE TEST: ICD-10-CM

## 2023-07-05 LAB
GLUCOSE SERPL-MCNC: 126 MG/DL
GLUCOSE SERPL-MCNC: 138 MG/DL
GLUCOSE SERPL-MCNC: 152 MG/DL
GLUCOSE SERPL-MCNC: 83 MG/DL (ref 70–110)

## 2023-07-05 PROCEDURE — 36415 COLL VENOUS BLD VENIPUNCTURE: CPT

## 2023-07-05 PROCEDURE — 82951 GLUCOSE TOLERANCE TEST (GTT): CPT

## 2023-07-05 PROCEDURE — 82952 GTT-ADDED SAMPLES: CPT

## 2023-07-07 ENCOUNTER — PATIENT MESSAGE (OUTPATIENT)
Dept: FAMILY MEDICINE | Facility: CLINIC | Age: 38
End: 2023-07-07
Payer: COMMERCIAL

## 2023-07-09 ENCOUNTER — PATIENT MESSAGE (OUTPATIENT)
Dept: OBSTETRICS AND GYNECOLOGY | Facility: CLINIC | Age: 38
End: 2023-07-09
Payer: COMMERCIAL

## 2023-07-10 ENCOUNTER — PATIENT MESSAGE (OUTPATIENT)
Dept: OBSTETRICS AND GYNECOLOGY | Facility: CLINIC | Age: 38
End: 2023-07-10
Payer: COMMERCIAL

## 2023-07-10 RX ORDER — ESCITALOPRAM OXALATE 5 MG/1
5 TABLET ORAL DAILY
Qty: 30 TABLET | Refills: 2 | Status: SHIPPED | OUTPATIENT
Start: 2023-07-10 | End: 2023-08-02

## 2023-07-10 NOTE — TELEPHONE ENCOUNTER
No care due was identified.  [facility] Embedded Care Due Messages. Reference number: [id number].   7/10/2023 2:18:10 PM CDT

## 2023-07-11 ENCOUNTER — PATIENT MESSAGE (OUTPATIENT)
Dept: OTHER | Facility: OTHER | Age: 38
End: 2023-07-11
Payer: COMMERCIAL

## 2023-07-11 RX ORDER — ESCITALOPRAM OXALATE 5 MG/1
TABLET ORAL
Qty: 30 TABLET | Refills: 2 | OUTPATIENT
Start: 2023-07-11

## 2023-07-11 NOTE — TELEPHONE ENCOUNTER
Refill Decision Note   Loren Thorpecell  is requesting a refill authorization.  Brief Assessment and Rationale for Refill:  Quick Discontinue     Medication Therapy Plan:       Medication Reconciliation Completed: No   Comments:     No Care Gaps recommended.     Note composed:9:29 AM 07/11/2023

## 2023-07-13 ENCOUNTER — PATIENT MESSAGE (OUTPATIENT)
Dept: REHABILITATION | Facility: OTHER | Age: 38
End: 2023-07-13
Payer: COMMERCIAL

## 2023-07-17 ENCOUNTER — CLINICAL SUPPORT (OUTPATIENT)
Dept: REHABILITATION | Facility: OTHER | Age: 38
End: 2023-07-17
Attending: ADVANCED PRACTICE MIDWIFE
Payer: COMMERCIAL

## 2023-07-17 ENCOUNTER — ROUTINE PRENATAL (OUTPATIENT)
Dept: OBSTETRICS AND GYNECOLOGY | Facility: CLINIC | Age: 38
End: 2023-07-17
Payer: COMMERCIAL

## 2023-07-17 VITALS
WEIGHT: 142.63 LBS | SYSTOLIC BLOOD PRESSURE: 108 MMHG | DIASTOLIC BLOOD PRESSURE: 62 MMHG | BODY MASS INDEX: 24.48 KG/M2

## 2023-07-17 DIAGNOSIS — R27.9 LACK OF COORDINATION: ICD-10-CM

## 2023-07-17 DIAGNOSIS — Z23 NEED FOR DIPHTHERIA-TETANUS-PERTUSSIS (TDAP) VACCINE: Primary | ICD-10-CM

## 2023-07-17 DIAGNOSIS — Z23 NEED FOR SECOND BOOSTER DOSE OF COVID-19 VACCINE: ICD-10-CM

## 2023-07-17 DIAGNOSIS — M62.08 DIASTASIS OF RECTUS ABDOMINIS: Primary | ICD-10-CM

## 2023-07-17 PROCEDURE — 99999 PR PBB SHADOW E&M-EST. PATIENT-LVL II: ICD-10-PCS | Mod: PBBFAC,,,

## 2023-07-17 PROCEDURE — 0502F SUBSEQUENT PRENATAL CARE: CPT | Mod: CPTII,S$GLB,,

## 2023-07-17 PROCEDURE — 97110 THERAPEUTIC EXERCISES: CPT | Mod: CQ

## 2023-07-17 PROCEDURE — 0502F PR SUBSEQUENT PRENATAL CARE: ICD-10-PCS | Mod: CPTII,S$GLB,,

## 2023-07-17 PROCEDURE — 99999 PR PBB SHADOW E&M-EST. PATIENT-LVL II: CPT | Mod: PBBFAC,,,

## 2023-07-17 NOTE — PROGRESS NOTES
"  Pelvic Health Physical Therapy   Treatment Note     Name: Loren Levin [place]  Clinic Number: [id number]    Therapy Diagnosis:   Encounter Diagnoses   Name Primary?    Diastasis of rectus abdominis Yes    Lack of coordination          Physician: Amarilis James CNM    Visit Date: 2023    Physician Orders: PT Eval and Treat   Medical Diagnosis from Referral: M62.08 (ICD-10-CM) -Diastasis recti   Evaluation Date: 3/30/2023  Authorization Period Expiration: 2023   Plan of Care Expiration: 2023  Progress Note Due: 2023  Visit # / Visits authorized:   FOTO: None     Precautions: ROSE MARIE 2023, older daughter is 2-[26 months] ** On pelvic rest, nothing internal**     Time In: 11:05  Time Out: 11:55  Total Appointment Time (timed & untimed codes): 50 minutes (patient requested to leave early)  Subjective     Pt reports: Had strep last week increasing pain in abdomen, excited to get back to strengthen since she hasn't felt up to it. Has scan next Tuesday to check placement of placenta to determine if she will get .       She was compliant with home exercise program.  Response to previous treatment:   Functional change:     Pain: /10    Location:   Objective     Loren received therapeutic exercises to develop  for 40 minutes including:       Bridge with GTB around knees lat pull down w/RTB x20  Clam w/GTB x20  Reverse clam ytb 2x10    Standing palloff press w/GTB 2x10 On foam  Side steps w/GTB x3x8 steps  Monster walks F/B 3x10 steps  STS onto airex w/GTB around knees 2x10    Wall plank mountain climbers 3x30"  Supine TA isometric with ball 10x2  Hook Lying hip add isomeric x10  Dead bug holds 20"x3  Dead bug legs only 8x2  METs for anterior R/ posterior L innominate 5"x5    Seated on P Ball, Straight arm pull down with GTB 2x10  Seated on P ball, Lat pull down GTB 2x10    Open books x10,3"  Cat cow x15  Arianna pose    Seated on ball pelvic tilts, lateral tills, CW,CCW 30" ea    Not " "performed 7/17/2023 :  Bridge w/GTB + hip abd x20,3"  No money w/RB 2x10,3"  Mid row seated on TB x20  Supine hip flexor stretch b/l 1 min    TA isometric with ball x10  TA isometric with ball + kegel  TrA +Marching x10  TrA+SLR x10  TrA + SLR and RTB pull down x10      Loren received the following manual therapy techniques: to develop for 5 minutes including:   Kinesiotaping over Omar for awareness   4 strips: inferior horizontal for support, vertical at linea alba, 2 diagonal for approximation of abdominal muscles    Loren participated in neuromuscular re-education activities to develop for 00 minutes including:     SEMG EVALUATION:   PF Electrode placement: external perineal  Pt. Position: side lying left side     SEMG Finding:    Treatment for strength and endurance 5" work, 10" rest x20: 10-15 Uv 1-3 sec hold and slow relaxation      RUSI for transverse abdominus training and bladder for diaphragmatic breathing  TrA training + coordination w/breathing (use of ball helpful before other exercises)  TrA with and w/o ball + SLR  TrA + marching  BKFO  x10  Bird dog x5,3"      Loren participated in dynamic functional therapeutic activities to improve functional performance for 10  minutes, including:    SIJ mobility and muscle balance, how to preform MET  HEP build/review    Home Exercises Provided and Patient Education Provided   Added 6/15/2023  METs for L posterior innominate  Standing plank  Standing side plank  Standing mountain climbers      Education provided:   - posture/body mechanices, isometric abdominal exercises, proper bearing down techniques, and Coordination of kegels with functional activities such as cough, laugh, sneeze, lift, etc.   Discussed progression of plan of care with patient; educated pt in activity modification; reviewed HEP with pt. Pt demonstrated and verbalized understanding of all instruction and was provided with a handout of HEP (see Patient Instructions).      Written Home " Exercises Provided: Patient instructed to cont prior HEP.  Exercises were reviewed and Loren was able to demonstrate them prior to the end of the session.  Loren demonstrated good  understanding of the education provided.     See EMR under Patient Instructions for exercises provided prior visit.    Assessment   Loren presents to PT reporting she is feeling sore from being sick and coughing. Hasn't had the strength to perform HEP, but has no complaints of pain. Continuation of strengthening ex performed without progressions due to patient still recovering. Patient requests taping over abdomen to increase stability. Will continue to progress per MD's suggestion following next visit with placenta previa.       Loren Is progressing well towards her goals.   Pt prognosis is Excellent.     Pt will continue to benefit from skilled outpatient physical therapy to address the deficits listed in the problem list box on initial evaluation, provide pt/family education and to maximize pt's level of independence in the home and community environment.     Pt's spiritual, cultural and educational needs considered and pt agreeable to plan of care and goals.     Anticipated barriers to physical therapy: growing baby/abdomen affecting the diastasis    Goals: Short Term Goals: 6 weeks      Pt to increase pelvic floor strength to at least 3/5 needed for pelvic support with ADLs and social activities. (Unable to assess due to pelvic rest)  Pt to demonstrate being able to correctly and consistently perform a kegel which is needed  to increase pelvic floor muscle coordination and strength needed for continence. (Unable to assess due to pelvic rest)  Pt to be able to perform a 7 second kegel x 10 reps to demonstrate improving strength and endurance. (Unable to assess due to pelvic rest)  Pt to demonstrate a decrease in her diastasis recti to 1 finger widths or less to demonstrate improving abdominal wall support and coordination for  improved ADL participation. (Unable to assess due to growing abdomen)  Updated: Pt able to manage pelvic girdle pain with conservative treatment, (stretching and self mobilizations)       Long Term Goals: 12 weeks      Pt to be discharged with home plan for carry over after discharge.   Pt to be able to perform a 10 second kegel x 10 reps to demonstrate improving strength and endurance. (Unable to assess due to pelvic rest)  Pt will be trained and compliant with postural strategies in sitting and standing to improve alignment and decrease pain and muscle fatigue (progressing)  Pt to increase strength by 1 grade to improve lumbopelvic stability needed to decrease pain with ADLs. (progressing)  Pt to demonstrate proper body mechanics with lifting to decrease strain on lumbopelvic and abdominal structures with ADLs.  (progressing)    Plan   Plan of care Certification: 6/29/2023 to 8/30/2023.     Outpatient Physical Therapy 1 time(s) every 2-3 week(s) for 10-12 weeks to include the following interventions: Electrical Stimulation SEMG, Manual Therapy, Moist Heat/ Ice, Neuromuscular Re-ed, Patient Education, Self Care, Therapeutic Activities, Therapeutic Exercise, and Ultrasound.     Patricia Gomez, PTA

## 2023-07-17 NOTE — PROGRESS NOTES
Pregnancy dating, medications, labs, ultrasound reports, prenatal testing, and problem list; prior records and results; and available outside records were reviewed and updated in EMR.  In addition, past medical, surgical, social, family, and obstetric history were also reviewed and updated in EMR. Pertinent findings were noted below.    Reason for Visit   Routine Prenatal Visit    38 y.o.,  at 30w6d    Complaints today: recovered from strep and GI virus, last symptoms were last week.  Doing well without concerns.    TW lbs   Contractions: No   Bleeding: No   Loss of fluid: No   Nausea: No   Vomiting: No   Headache: No       Fetal Movement: Yes     PHYSICAL EXAM  GENERAL: No acute distress  HEENT: Normocephalic, atraumatic  NEURO: Alert and oriented x3  PSYCH: Normal mood and affect  PULMONARY: Non-labored respiration; no tachypnea  ABD: Soft, gravid, nontender.      ASSESSMENT AND PLAN     Problems (from 03/10/23 to present)     Problem Noted Resolved    COVID-19 affecting pregnancy in second trimester 5/10/2023 by Amarilis James CNM No    Overview Addendum 5/10/2023 10:26 AM by Amarilis James CNM     2023 at 19w EGA         Placenta previa in second trimester 2023 by Maryellen Guerra CNM No    Overview Signed 2023  6:18 AM by Maryellen Guerra CNM     Seen on anatomy          History of low transverse  section - considering TOLAC 3/14/2023 by Amarilis Jaems CNM No    Overview Signed 3/14/2023  6:11 PM by Amarilis James CNM     C/S done here for failure to progress, chorio, and subsequent NRFHT's (dilated to complete and pushed - OP)  TOLAC calc 59%  [ ]Approval for          Pregnancy 3/14/2023 by Amarilis James CNM No    Overview Signed 3/14/2023  6:13 PM by Amarilis James CNM     Connected Mom:   Prepregnancy BMI: 19   Pap: 2022 - NILM; HR HPV neg  Dating - per 9w sono  U/S -  Aneuploidy screening - [id number] - neg  [ ]AFP  Blood type: O POS.  GDM screen -    Vaccines -   [x]Flu  [x]Covid-19 x3  [ ]TDAP  Contraception -  Peds -   Circ -   GBS  [ ]Consents         Antepartum multigravida of advanced maternal age 3/14/2023 by Amarilis James CNM No    Overview Signed 3/14/2023  6:14 PM by Amarilis James CNM     [x][id number] - negative  [ ]32w growth sono               32 week US scheduled  Reviewed 28wk labs - will get thirds in labor  Desires TDAP - appt with imm clinic made  Is not taking Childbirth Education classes.  Education regarding options for postpartum contraception, patient is considering vasectomy for partner.  Patient has not received bivalent booster -- counseled on timing related to last diagnosis of covid. Order placed.    Reviewed warning s/s for PreE, PTL, FMC, and discussed how/when to call.    Birth Center Risk Assessment: 1-management on labor and Delivery     0- CNM management in ABC  1- CNM management on L&D  2- Consultation with OB to develop  plan of care  3- Collaborative CNM/OB management with delivery on L&D  4- Permanent referral of care to MD      Follow-up: 2 weeks, call or present sooner JUN Juares CNM

## 2023-07-24 ENCOUNTER — PATIENT MESSAGE (OUTPATIENT)
Dept: MATERNAL FETAL MEDICINE | Facility: CLINIC | Age: 38
End: 2023-07-24
Payer: COMMERCIAL

## 2023-07-25 ENCOUNTER — PATIENT MESSAGE (OUTPATIENT)
Dept: OTHER | Facility: OTHER | Age: 38
End: 2023-07-25
Payer: COMMERCIAL

## 2023-07-25 ENCOUNTER — PROCEDURE VISIT (OUTPATIENT)
Dept: MATERNAL FETAL MEDICINE | Facility: CLINIC | Age: 38
End: 2023-07-25
Payer: COMMERCIAL

## 2023-07-25 DIAGNOSIS — Z36.89 ENCOUNTER FOR ULTRASOUND TO ASSESS FETAL GROWTH: ICD-10-CM

## 2023-07-25 PROCEDURE — 76817 US MFM PROCEDURE (VIEWPOINT): ICD-10-PCS | Mod: S$GLB,,, | Performed by: OBSTETRICS & GYNECOLOGY

## 2023-07-25 PROCEDURE — 76817 TRANSVAGINAL US OBSTETRIC: CPT | Mod: S$GLB,,, | Performed by: OBSTETRICS & GYNECOLOGY

## 2023-07-25 PROCEDURE — 76816 US MFM PROCEDURE (VIEWPOINT): ICD-10-PCS | Mod: S$GLB,,, | Performed by: OBSTETRICS & GYNECOLOGY

## 2023-07-25 PROCEDURE — 76816 OB US FOLLOW-UP PER FETUS: CPT | Mod: S$GLB,,, | Performed by: OBSTETRICS & GYNECOLOGY

## 2023-08-01 NOTE — TELEPHONE ENCOUNTER
Refill Routing Note   Medication(s) are not appropriate for processing by [facility] for the following reason(s):      POSITIVE PREGNANCY STATUS CHECK    [facility] action(s):  Defer Care Due:  None identified          Pharmacist review requested: Yes     Appointments  past 12m or future 3m with PCP    Date Provider   Last Visit   8/17/2022 Esther Brunson MD   Next Visit   Visit date not found Esther Brunson MD   ED visits in past 90 days: 0        Note composed:12:36 PM 08/01/2023

## 2023-08-01 NOTE — TELEPHONE ENCOUNTER
No care due was identified.  [facility] Embedded Care Due Messages. Reference number: [id number].   8/01/2023 12:30:58 PM CDT

## 2023-08-02 RX ORDER — ESCITALOPRAM OXALATE 5 MG/1
5 TABLET ORAL
Qty: 90 TABLET | Refills: 0 | Status: SHIPPED | OUTPATIENT
Start: 2023-08-02 | End: 2023-11-11

## 2023-08-08 ENCOUNTER — CLINICAL SUPPORT (OUTPATIENT)
Dept: OBSTETRICS AND GYNECOLOGY | Facility: CLINIC | Age: 38
End: 2023-08-08
Payer: COMMERCIAL

## 2023-08-08 ENCOUNTER — ROUTINE PRENATAL (OUTPATIENT)
Dept: OBSTETRICS AND GYNECOLOGY | Facility: CLINIC | Age: 38
End: 2023-08-08
Payer: COMMERCIAL

## 2023-08-08 VITALS
SYSTOLIC BLOOD PRESSURE: 104 MMHG | WEIGHT: 145.38 LBS | BODY MASS INDEX: 24.96 KG/M2 | DIASTOLIC BLOOD PRESSURE: 78 MMHG

## 2023-08-08 DIAGNOSIS — Z23 NEED FOR TDAP VACCINATION: Primary | ICD-10-CM

## 2023-08-08 DIAGNOSIS — Z98.891 HISTORY OF LOW TRANSVERSE CESAREAN SECTION: ICD-10-CM

## 2023-08-08 DIAGNOSIS — O09.529 ANTEPARTUM MULTIGRAVIDA OF ADVANCED MATERNAL AGE: ICD-10-CM

## 2023-08-08 DIAGNOSIS — Z3A.34 34 WEEKS GESTATION OF PREGNANCY: Primary | ICD-10-CM

## 2023-08-08 PROCEDURE — 99999 PR PBB SHADOW E&M-EST. PATIENT-LVL III: ICD-10-PCS | Mod: PBBFAC,,,

## 2023-08-08 PROCEDURE — 99999 PR PBB SHADOW E&M-EST. PATIENT-LVL III: CPT | Mod: PBBFAC,,,

## 2023-08-08 PROCEDURE — 99999 PR PBB SHADOW E&M-EST. PATIENT-LVL II: CPT | Mod: PBBFAC,,,

## 2023-08-08 PROCEDURE — 90715 TDAP VACCINE 7 YRS/> IM: CPT | Mod: S$GLB,,,

## 2023-08-08 PROCEDURE — 90471 TDAP VACCINE GREATER THAN OR EQUAL TO 7YO IM: ICD-10-PCS | Mod: S$GLB,,,

## 2023-08-08 PROCEDURE — 90471 IMMUNIZATION ADMIN: CPT | Mod: S$GLB,,,

## 2023-08-08 PROCEDURE — 90715 TDAP VACCINE GREATER THAN OR EQUAL TO 7YO IM: ICD-10-PCS | Mod: S$GLB,,,

## 2023-08-08 PROCEDURE — 0502F SUBSEQUENT PRENATAL CARE: CPT | Mod: CPTII,S$GLB,,

## 2023-08-08 PROCEDURE — 99999 PR PBB SHADOW E&M-EST. PATIENT-LVL II: ICD-10-PCS | Mod: PBBFAC,,,

## 2023-08-08 PROCEDURE — 0502F PR SUBSEQUENT PRENATAL CARE: ICD-10-PCS | Mod: CPTII,S$GLB,,

## 2023-08-08 NOTE — PROGRESS NOTES
38 y.o.,  at 34w0d, presents for routine OB appt.  Denies LOF, VB, or UCs. Reports good FM.  Doing well today, without concerns. Having some hola islva. Placenta previa resolved at 32 week scan. Desires SVE due to hola silva.    TW lbs     PHYSICAL EXAM  GENERAL: No acute distress  HEENT: Normocephalic, atraumatic  NEURO: Alert and oriented x3  PULMONARY: Non-labored respiration; no tachypnea  ABD: Soft, gravid, nontender.    ASSESSMENT AND PLAN  34 weeks gestation of pregnancy    History of low transverse  section - considering TOLAC    Antepartum multigravida of advanced maternal age       Reviewed upcoming 36wk labs.    Reviewed patient does not have a history of genital HSV.     Reviewed ABC risk assessment with the patient:  Birth Center Risk Assessment: 1-management on labor and Delivery  0- CNM management in ABC  1- CNM management on L&D  2- Consultation with OB to develop  plan of care  3- Collaborative CNM/OB management with delivery on L&D  4- Permanent referral of care to MD  Patient understands is currently not a candidate for the ABC. Reviewed potential risks which could arise, that could change this status.    Reviewed warning signs, normal FM,  labor precautions, and how/when to call.  Confirmed pt has after-hours number.    Follow-up: 2 weeks, call or present sooner JUN Juares, NOE, APRN

## 2023-08-10 ENCOUNTER — CLINICAL SUPPORT (OUTPATIENT)
Dept: REHABILITATION | Facility: OTHER | Age: 38
End: 2023-08-10
Payer: COMMERCIAL

## 2023-08-10 DIAGNOSIS — M62.08 DIASTASIS OF RECTUS ABDOMINIS: Primary | ICD-10-CM

## 2023-08-10 DIAGNOSIS — R27.9 LACK OF COORDINATION: ICD-10-CM

## 2023-08-10 PROCEDURE — 97110 THERAPEUTIC EXERCISES: CPT

## 2023-08-10 PROCEDURE — 97530 THERAPEUTIC ACTIVITIES: CPT

## 2023-08-10 NOTE — PROGRESS NOTES
"  Pelvic Health Physical Therapy   Treatment Note     Name: Loren Levin [place]  Clinic Number: [id number]    Therapy Diagnosis:   Encounter Diagnoses   Name Primary?    Diastasis of rectus abdominis Yes    Lack of coordination          Physician: Amarilis James CNM    Visit Date: 8/10/2023    Physician Orders: PT Eval and Treat   Medical Diagnosis from Referral: M62.08 (ICD-10-CM) -Diastasis recti   Evaluation Date: 3/30/2023  Authorization Period Expiration: 2023   Plan of Care Expiration: 2023  Progress Note Due: 2023  Visit # / Visits authorized:   FOTO: None     Precautions: ROSE MARIE 2023, older daughter is 2-[26 months] ** On pelvic rest, nothing internal**     Time In: 10:35  Time Out: 11:30  Total Appointment Time (timed & untimed codes): 50 minutes (patient requested to leave early)  Subjective     Pt reports: She had a visit and her Placenta previa is resolved and plans to have a  , they did a cervical check and is is about 1cm dilated. The L side low back is a little wonky but the METs helps. She has been getting [facility] horse and the compression socks helped, she is ordering magnesium.      She was compliant with home exercise program.  Response to previous treatment:   Functional change:     Pain: /10    Location:   Objective     Loren received therapeutic exercises to develop  for 30 minutes including:       Bridge with GTB around knees lat pull down w/RTB x20    Side steps w/GTB x3x8 steps  Monster walks F/B 3x10 steps  STS onto airex w/GTB around knees 2x10    +Standing torso rotation 3.3#  +Standing elbow to knee 3.3#  +Standing ball press TA  +Seated ball press TA  +Standing hip abd isometric  +Standing chop 3.3#    Wall plank mountain climbers 3x30"    Seated on P Ball, Straight arm pull down with GTB 3x10  Seated on P ball, Lat pull down GTB 3x10    Open books x10,3"  Cat cow x15  Arianna pose    Seated on ball pelvic tilts, lateral tills, CW,CCW 30" ea    Pelvic " "floor assessment:    Loren received the following manual therapy techniques: to develop for 00 minutes including:   Kinesiotaping over Omar for awareness   4 strips: inferior horizontal for support, vertical at linea alba, 2 diagonal for approximation of abdominal muscles    Loren participated in neuromuscular re-education activities to develop for 00 minutes including:     SEMG EVALUATION:   PF Electrode placement: external perineal  Pt. Position: side lying left side     SEMG Finding:    Treatment for strength and endurance 5" work, 10" rest x20: 10-15 Uv 1-3 sec hold and slow relaxation      RUSI for transverse abdominus training and bladder for diaphragmatic breathing  TrA training + coordination w/breathing (use of ball helpful before other exercises)  TrA with and w/o ball + SLR  TrA + marching  BKFO  x10  Bird dog x5,3"      Loren participated in dynamic functional therapeutic activities to improve functional performance for 30  minutes, including:    Baby prep:  Benefits of breathing  Perineal stretching  Exercise do's and don't  Exercise modification  Pelvic floor relaxation instead of kegels    Home Exercises Provided and Patient Education Provided   8/10/2023  See Patient instructions    Education provided:   - posture/body mechanices, isometric abdominal exercises, proper bearing down techniques, and Coordination of kegels with functional activities such as cough, laugh, sneeze, lift, etc.   Discussed progression of plan of care with patient; educated pt in activity modification; reviewed HEP with pt. Pt demonstrated and verbalized understanding of all instruction and was provided with a handout of HEP (see Patient Instructions).      Written Home Exercises Provided: Patient instructed to cont prior HEP.  Exercises were reviewed and Loren was able to demonstrate them prior to the end of the session.  Loren demonstrated good  understanding of the education provided.     See EMR under Patient " Instructions for exercises provided prior visit.    Assessment   Loren presents to PT and wants to work on labor and delivery prep. Completed pelvic floor assessment with pt report of feeling tightness and tenderness citing its possibility because of pelvic rest. She is able to get a good relaxation however has some difficulty bearing down in the supine position and feels it may be better when in the more upright birthing position, pt instructed to work on feeling the pelvic floor drop with breathing exercises and stretches. Discussed modifying exercises because of growing belly. Worked on oblique and abdominal exercises as well as hip stabilization exercises. Pt given handout on preparing for birth.      Loren Is progressing well towards her goals.   Pt prognosis is Excellent.     Pt will continue to benefit from skilled outpatient physical therapy to address the deficits listed in the problem list box on initial evaluation, provide pt/family education and to maximize pt's level of independence in the home and community environment.     Pt's spiritual, cultural and educational needs considered and pt agreeable to plan of care and goals.     Anticipated barriers to physical therapy: growing baby/abdomen affecting the diastasis    Goals: Short Term Goals: 6 weeks      Pt to increase pelvic floor strength to at least 3/5 needed for pelvic support with ADLs and social activities. (Unable to assess due to pelvic rest)  Pt to demonstrate being able to correctly and consistently perform a kegel which is needed  to increase pelvic floor muscle coordination and strength needed for continence. (Unable to assess due to pelvic rest)  Pt to be able to perform a 7 second kegel x 10 reps to demonstrate improving strength and endurance. (Unable to assess due to pelvic rest)  Pt to demonstrate a decrease in her diastasis recti to 1 finger widths or less to demonstrate improving abdominal wall support and coordination for  improved ADL participation. (Unable to assess due to growing abdomen)  Updated: Pt able to manage pelvic girdle pain with conservative treatment, (stretching and self mobilizations)       Long Term Goals: 12 weeks      Pt to be discharged with home plan for carry over after discharge.   Pt to be able to perform a 10 second kegel x 10 reps to demonstrate improving strength and endurance. (Unable to assess due to pelvic rest)  Pt will be trained and compliant with postural strategies in sitting and standing to improve alignment and decrease pain and muscle fatigue (progressing)  Pt to increase strength by 1 grade to improve lumbopelvic stability needed to decrease pain with ADLs. (progressing)  Pt to demonstrate proper body mechanics with lifting to decrease strain on lumbopelvic and abdominal structures with ADLs.  (progressing)    Plan   Plan of care Certification: 6/29/2023 to 8/30/2023.     Outpatient Physical Therapy 1 time(s) every 2-3 week(s) for 10-12 weeks to include the following interventions: Electrical Stimulation SEMG, Manual Therapy, Moist Heat/ Ice, Neuromuscular Re-ed, Patient Education, Self Care, Therapeutic Activities, Therapeutic Exercise, and Ultrasound.     Kirsten Wilde, PT

## 2023-08-10 NOTE — PATIENT INSTRUCTIONS
Childbirth and Your Pelvic Floor      BREATHING    Diaphragmatic or belly breathing is an important technique to utilize during labor because it helps settle down or relax the autonomic nervous system. This can assist with pain relief between contractions and also help to relax the pelvic floor for a more comfortable delivery. This is accomplished by slow rhythmic breathing concentrated in the diaphragm muscle rather than the upper chest. As contractions become more intense, take a long, slow, deep breath in through your nose and down into your belly, imagining your pelvis opening up like an umbrella as you inhale. Pause at the top of the breath, and then exhale slowly and with control. Repeat this process for 10-15 breaths or until you feel your body begin to relax.    PUSHING    The two main types of pushing are open-glottis and closed-glottis. Open-glottis pushing means that you exhale through pursed lips (as if you are blowing out birthday candles) as you are actively pushing. Closed-glottis pushing is when you close your mouth and bear down (also known as Valsalva) as you push. Lots of research has been done about the efficacy of both kinds of pushing and the results are surprising... the best pushing technique is simply the one that you choose. Studies have shown that the type of pushing utilized has no impact on effectiveness and speed of delivery, likelihood of tearing, postpartum hemorrhage, or impact on the pelvic floor. Open-glottis pushing is often more comfortable and can often be less fatiguing, but it can also be challenging if you receive an epidural and have difficulty feeling your pushes. The best way to figure out what type of pushing works for you is to practice! Try both techniques while on the toilet and see what feels most comfortable for your body.     MOVEMENT    One thing that is often surprising to first-time moms is that you CAN move after having an epidural. You will not be getting out  of the bed, but you still have the option of moving in and out of different positions while in bed with the assistance of your nurses. Gentle movement can help to speed up the labor process and make contractions more comfortable. Additionally, during labor your sacrum and tailbone need to move posteriorly to allow room for the baby. Staying in a position that puts pressure on this part of the body (such as the traditional lithotomy position or laying flat on your back) can restrict this movement and prevent opening of the pelvic outlet. One recommendation to promote labor process (and make things more comfortable for you) is to change positions every 20-30 minutes.     PROPS    If your plan is to give birth in the hospital or at [facility], you may have access to props like peanut balls or yoga balls that can help make the labor and delivery process more comfortable. It's important to be aware that availability of these items depends on how many other moms are admitted at the same time as you. If you know that you want to use certain props during the process, you may want to purchase your own and bring them with you.     EPIDURAL OR NO EPIDURAL?     Strong research from 2019 has shown that epidurals do not have an association with perineal tearing. Keep in mind that epidurals can, however, make it more challenging for you to feel what's happening in your pelvic floor when you are pushing.     HOSPITAL POLICIES    Questions about visitor policies? Concerned about COVID testing? The best thing to do is contact labor and delivery at around 36 or 37 weeks to clarify any questions that you might have as your due date gets closer. With the COVID virus still being part of our day to day life, the L&D policies change on a frequent basis so it's best to wait until closer to your due date to ask about hospital policies.     ... NOW WHAT?    After all the hard work is said and done, you may need a  "little extra TLC during your recovery period. These items can help make postpartum healing more comfortable for you.   Perineal ice packs - These small ice packs are specially made to be absorbant and fit into your underwear. They can help ease pain and swelling after childbirth and are often used during the healing process if you experience tearing.   Priscilla bottle - Lochia is normal vaginal discharge (made up of a combination of sloughed off uterine lining and blood) that can persist for days to weeks after delivery. A priscilla bottle is essentially a small squirt bottle that allows you to gently and effectively clean your perineal area. This can help prevent infection and speed up the recovery process. Additionally, a priscilla bottle allows you to gently rinse after urinating or having a bowel movement and then pat the area dry in order to avoid having to wipe sensitive tissues.   Witch hazel pads - You can make your own "padsicles" at home by pouring alcohol-free witch hazel onto sanitary pads and placing them in the freezer. This can help provide relief from pain, swelling, and itching.   Compression underwear - Compression garments can help provide pelvic support and decrease swelling in the postpartum period. High-waisted compression underwear are especially helpful for supporting your abdominal muscles as you heal.     TRANSITIONS AFTER BABY    Whether you had a  or a vaginal delivery, it's important to be gentle on your body during the postpartum process. You may be sore or feel weaker than normal. The best way to move in and out of bed after baby and in the weeks following is to use the log roll technique. Sitting straight up from a lying down position can put a lot of tension on your abdominal and pelvic muscles. Avoid this by following these steps to transition in bed:  Bend knees.  While keeping your knees together, roll your entire body onto one side. Avoid any twisting motion in your upper body.  Prop " yourself up on your elbow, and at the same time drop your legs off the side of the bed and push with your arm to sit up.    RESUMING PHYSICAL THERAPY  You will have a scheduled check-up with your obstetrician at 6 weeks postpartum. Your doctor will perform a vaginal examination and clear you to resume normal activities like intercourse and exercise. It's important to let your doctor know if you are having any symptoms such as urinary leakage, constipation or loose stool, abdominal separation, and pain with sex or physical activity.     If you are having symptoms, you can contact physical therapy to resume your sessions at any point after your 6 week postpartum visit. Some individuals may want to come back to therapy just for a postpartum re-assessment so that they can feel confident in going back to their normal routine. Others may want to continue working towards the goals that they had while they were pregnant. Listen to what feels right for your body, and for when you feel ready to jump back in to a physical therapy routine. Getting good sleep, nutrition, and plenty of bonding time with your new baby is the priority during this time.

## 2023-08-15 ENCOUNTER — PATIENT MESSAGE (OUTPATIENT)
Dept: OTHER | Facility: OTHER | Age: 38
End: 2023-08-15
Payer: COMMERCIAL

## 2023-08-23 ENCOUNTER — ROUTINE PRENATAL (OUTPATIENT)
Dept: OBSTETRICS AND GYNECOLOGY | Facility: CLINIC | Age: 38
End: 2023-08-23
Payer: COMMERCIAL

## 2023-08-23 ENCOUNTER — HOSPITAL ENCOUNTER (OUTPATIENT)
Dept: PERINATAL CARE | Facility: OTHER | Age: 38
Discharge: HOME | End: 2023-08-23
Attending: ADVANCED PRACTICE MIDWIFE
Payer: COMMERCIAL

## 2023-08-23 VITALS
DIASTOLIC BLOOD PRESSURE: 60 MMHG | SYSTOLIC BLOOD PRESSURE: 110 MMHG | WEIGHT: 150.44 LBS | BODY MASS INDEX: 25.83 KG/M2

## 2023-08-23 DIAGNOSIS — O36.8390 FETAL TACHYCARDIA AFFECTING MANAGEMENT OF MOTHER: Primary | ICD-10-CM

## 2023-08-23 DIAGNOSIS — Z34.90 ENCOUNTER FOR INDUCTION OF LABOR: Primary | ICD-10-CM

## 2023-08-23 DIAGNOSIS — Z36.85 ANTENATAL SCREENING FOR STREPTOCOCCUS B: ICD-10-CM

## 2023-08-23 DIAGNOSIS — Z3A.36 36 WEEKS GESTATION OF PREGNANCY: ICD-10-CM

## 2023-08-23 DIAGNOSIS — O36.8390 FETAL TACHYCARDIA AFFECTING MANAGEMENT OF MOTHER: ICD-10-CM

## 2023-08-23 PROCEDURE — 0502F SUBSEQUENT PRENATAL CARE: CPT | Mod: CPTII,S$GLB,, | Performed by: ADVANCED PRACTICE MIDWIFE

## 2023-08-23 PROCEDURE — 87081 CULTURE SCREEN ONLY: CPT | Performed by: ADVANCED PRACTICE MIDWIFE

## 2023-08-23 PROCEDURE — 59025 FETAL NON-STRESS TEST: CPT | Mod: 26,,, | Performed by: OBSTETRICS & GYNECOLOGY

## 2023-08-23 PROCEDURE — 0502F PR SUBSEQUENT PRENATAL CARE: ICD-10-PCS | Mod: CPTII,S$GLB,, | Performed by: ADVANCED PRACTICE MIDWIFE

## 2023-08-23 PROCEDURE — 76815 OB US LIMITED FETUS(S): CPT

## 2023-08-23 PROCEDURE — 59025 PRENATAL TESTING - NST/AFI: ICD-10-PCS | Mod: 26,,, | Performed by: OBSTETRICS & GYNECOLOGY

## 2023-08-23 PROCEDURE — 76815 OB US LIMITED FETUS(S): CPT | Mod: 26,,, | Performed by: OBSTETRICS & GYNECOLOGY

## 2023-08-23 PROCEDURE — 99999 PR PBB SHADOW E&M-EST. PATIENT-LVL II: CPT | Mod: PBBFAC,,, | Performed by: ADVANCED PRACTICE MIDWIFE

## 2023-08-23 PROCEDURE — 59025 FETAL NON-STRESS TEST: CPT

## 2023-08-23 PROCEDURE — 76815 PRENATAL TESTING - NST/AFI: ICD-10-PCS | Mod: 26,,, | Performed by: OBSTETRICS & GYNECOLOGY

## 2023-08-23 PROCEDURE — 99999 PR PBB SHADOW E&M-EST. PATIENT-LVL II: ICD-10-PCS | Mod: PBBFAC,,, | Performed by: ADVANCED PRACTICE MIDWIFE

## 2023-08-23 NOTE — PROGRESS NOTES
38 y.o.,  at 36w1d here today for routine ob appt.     Complaints today: none; reports spouse is heading to a wedding in [place].  Doing well without concerns. Denies LOF, VB, and cramping or regular contractions. Denies dysuria and any other s/s UTI. Denies HA, visual disturbances, and upper abdominal pain. Reports good fetal movement.    Verified no history of genital HSV.  TW lbs     PHYSICAL EXAM  GENERAL: No acute distress  HEENT: Normocephalic, atraumatic  NEURO: Alert and oriented x3  PULMONARY: Non-labored respiration; no tachypnea  ABD: Soft, gravid, nontender. Vtx per Leopold's.    ASSESSMENT AND PLAN  There are no diagnoses linked to this encounter.   Discussed again R/B to TOLAC vs. RCS. Discussed risks and benefits to scheduled induction. Patient desires to schedule induction on/around 40 weeks.    GBS collected today. Reviewed Allergies: Patient has no known allergies.   Reviewed [place] department of Health recommendation for repeat HIV/RPR today; patient accepts and will have drawn today   To prenatal testing for fetal tachycardia now    Reviewed warning signs, normal FM,  labor precautions, and how/when to call.      Follow-up: 1 week, call or present sooner GUADALUPEN    Linda Duval CNM/JACQUES

## 2023-08-24 ENCOUNTER — PATIENT MESSAGE (OUTPATIENT)
Dept: OBSTETRICS AND GYNECOLOGY | Facility: CLINIC | Age: 38
End: 2023-08-24
Payer: COMMERCIAL

## 2023-08-24 ENCOUNTER — CLINICAL SUPPORT (OUTPATIENT)
Dept: REHABILITATION | Facility: OTHER | Age: 38
End: 2023-08-24
Payer: COMMERCIAL

## 2023-08-24 ENCOUNTER — DOCUMENTATION ONLY (OUTPATIENT)
Dept: OBSTETRICS AND GYNECOLOGY | Facility: CLINIC | Age: 38
End: 2023-08-24
Payer: COMMERCIAL

## 2023-08-24 DIAGNOSIS — M62.89 PELVIC FLOOR DYSFUNCTION: Primary | ICD-10-CM

## 2023-08-24 DIAGNOSIS — M62.08 DIASTASIS OF RECTUS ABDOMINIS: Primary | ICD-10-CM

## 2023-08-24 DIAGNOSIS — R27.9 LACK OF COORDINATION: ICD-10-CM

## 2023-08-24 PROCEDURE — 97112 NEUROMUSCULAR REEDUCATION: CPT

## 2023-08-24 PROCEDURE — 97110 THERAPEUTIC EXERCISES: CPT

## 2023-08-24 PROCEDURE — 97140 MANUAL THERAPY 1/> REGIONS: CPT

## 2023-08-24 NOTE — PROGRESS NOTES
"  Pelvic Health Physical Therapy   Treatment and Discharge Note     Name: Loren Levin [facility] Number: [id number]    Therapy Diagnosis:   Encounter Diagnoses   Name Primary?    Diastasis of rectus abdominis Yes    Lack of coordination          Physician: Amarilis James CNM    Visit Date: 2023    Physician Orders: PT Eval and Treat   Medical Diagnosis from Referral: M62.08 (ICD-10-CM) -Diastasis recti   Evaluation Date: 3/30/2023  Authorization Period Expiration: 2023   Plan of Care Expiration: 2023  Progress Note Due: 2023  Visit # / Visits authorized:   FOTO: None     Precautions: ROSE MARIE 2023, older daughter is 2-[26 months] ** On pelvic rest, nothing internal**     Time In: 10:40  Time Out: 11:33  Total Appointment Time (timed & untimed codes): 53 minutes   Subjective     Pt reports: She started a new job. She has been swimming that feels good, she has been doing a lot a of cat cow and the cat cow help with getting the baby positioned for birth. She had been working on the standing core exercises that we worked on last visit. She did have a change to look through the birthing material    She had a visit and her Placenta previa is resolved and plans to have a  , they did a cervical check and is is about 1cm dilated. The L side low back is a little wonky but the METs helps. She has been getting charley horse and the compression socks helped, she is ordering magnesium.      She was compliant with home exercise program.  Response to previous treatment:   Functional change:     Pain: /10    Location:   Objective     Loren received therapeutic exercises to develop  for 25 minutes including:         Seated on P Ball, Straight arm pull down with GTB 3x10  Seated on P ball, Lat pull down GTB 3x10    Open books x10,3"  +Cobra pose x15  Cat cow x15  Seated Arianna pose with P ball    Seated on ball pelvic tilts, lateral tills, CW,CCW 30" franky      Loren received the following " manual therapy techniques: to develop for 15 minutes including:     STM to low back and Gluts for down training and to decrease tension   Prone with pregnancy pillow  Kinesiotaping over Omar for awareness              5 strips: 2 inferior horizontal for support, vertical at linea alba, diagonal for approximation of abdominal muscles    Loren participated in neuromuscular re-education activities to develop for 15 minutes including:     Seated on P ball for pelvic floor, mobility, and abdominal awareness:   Pelvig rolling CCW/CW 2 min   Pelvic anterior/posterior tilt 2 min    Pelvic lateral tilt 2 min    LE extension x10   UE flexion & LE extension x20    Loren participated in dynamic functional therapeutic activities to improve functional performance for 00 minutes, including:    Baby prep:  Benefits of breathing  Perineal stretching  Exercise do's and don't  Exercise modification  Pelvic floor relaxation instead of kegels    Home Exercises Provided and Patient Education Provided   8/24/2023  See Patient instructions    Education provided:   - posture/body mechanices, isometric abdominal exercises, proper bearing down techniques, and Coordination of kegels with functional activities such as cough, laugh, sneeze, lift, etc.   Discussed progression of plan of care with patient; educated pt in activity modification; reviewed HEP with pt. Pt demonstrated and verbalized understanding of all instruction and was provided with a handout of HEP (see Patient Instructions).      Written Home Exercises Provided: Patient instructed to cont prior HEP.  Exercises were reviewed and Loren was able to demonstrate them prior to the end of the session.  Loren demonstrated good  understanding of the education provided.     See EMR under Patient Instructions for exercises provided prior visit.    Assessment   Loren presents to PT with general fatigue and reports no no pain but she has looked though childbirth and pregnancy  material with reports of no additional questions. She is continuing with the stretches at home and working on the modified abdominal exercises, also working on getting the baby moving so she is head down by the time she is ready to deliver. Continued with stretches and strengthening this visit for stabilization. Completed manual work to the low back and gluts with pt report of tightness in the area. Pt encouraged to continue with he stretches and modification and focus on relaxation when she can.       Loren Is progressing well towards her goals.   Pt prognosis is Excellent.     Pt will continue to benefit from skilled outpatient physical therapy to address the deficits listed in the problem list box on initial evaluation, provide pt/family education and to maximize pt's level of independence in the home and community environment.     Pt's spiritual, cultural and educational needs considered and pt agreeable to plan of care and goals.     Anticipated barriers to physical therapy: growing baby/abdomen affecting the diastasis    Goals: Short Term Goals: 6 weeks      Pt to increase pelvic floor strength to at least 3/5 needed for pelvic support with ADLs and social activities. (Met 8/10/2023)  Pt to demonstrate being able to correctly and consistently perform a kegel which is needed  to increase pelvic floor muscle coordination and strength needed for continence. (Met 8/10/2023)  Pt to be able to perform a 7 second kegel x 10 reps to demonstrate improving strength and endurance. (Partially met)  Pt to demonstrate a decrease in her diastasis recti to 1 finger widths or less to demonstrate improving abdominal wall support and coordination for improved ADL participation. (Unable to assess due to growing abdomen)  Updated: Pt able to manage pelvic girdle pain with conservative treatment, (stretching and self mobilizations) MET 8/24/2023       Long Term Goals: 12 weeks      Pt to be discharged with home plan for carry  over after discharge. (Met 8/24/2023)  Pt to be able to perform a 10 second kegel x 10 reps to demonstrate improving strength and endurance. (Partially met)  Pt will be trained and compliant with postural strategies in sitting and standing to improve alignment and decrease pain and muscle fatigue MET 8/24/2023  Pt to increase strength by 1 grade to improve lumbopelvic stability needed to decrease pain with ADLs. (Partially met)  Pt to demonstrate proper body mechanics with lifting to decrease strain on lumbopelvic and abdominal structures with ADLs.  MET 8/24/2023    Plan     DC to HEP and birthing of child. Plans to come back to PT after cleared by MD Kirsten Wilde, PT

## 2023-08-24 NOTE — PROGRESS NOTES
CNMs reviewed patient medical and obstetrical history with Dr. Harris.     Patient's history is significant for c/s for nFHT while pushing at 10 cm      Dr. Harris recommendations for patient:  approved for

## 2023-08-25 LAB — BACTERIA SPEC AEROBE CULT: NORMAL

## 2023-08-26 ENCOUNTER — PATIENT MESSAGE (OUTPATIENT)
Dept: OBSTETRICS AND GYNECOLOGY | Facility: CLINIC | Age: 38
End: 2023-08-26
Payer: COMMERCIAL

## 2023-08-30 ENCOUNTER — ROUTINE PRENATAL (OUTPATIENT)
Dept: OBSTETRICS AND GYNECOLOGY | Facility: CLINIC | Age: 38
End: 2023-08-30
Payer: COMMERCIAL

## 2023-08-30 VITALS
DIASTOLIC BLOOD PRESSURE: 68 MMHG | BODY MASS INDEX: 26.45 KG/M2 | WEIGHT: 154.13 LBS | SYSTOLIC BLOOD PRESSURE: 110 MMHG

## 2023-08-30 DIAGNOSIS — Z3A.37 37 WEEKS GESTATION OF PREGNANCY: Primary | ICD-10-CM

## 2023-08-30 PROCEDURE — 99999 PR PBB SHADOW E&M-EST. PATIENT-LVL III: ICD-10-PCS | Mod: PBBFAC,,,

## 2023-08-30 PROCEDURE — 0502F SUBSEQUENT PRENATAL CARE: CPT | Mod: CPTII,S$GLB,,

## 2023-08-30 PROCEDURE — 99999 PR PBB SHADOW E&M-EST. PATIENT-LVL III: CPT | Mod: PBBFAC,,,

## 2023-08-30 PROCEDURE — 0502F PR SUBSEQUENT PRENATAL CARE: ICD-10-PCS | Mod: CPTII,S$GLB,,

## 2023-08-30 NOTE — PROGRESS NOTES
38 y.o.,  at 37w1d presents today for routine OB appt.  Denies regular UCs, LOF or VB. Reports good FM. Doing well without concerns.  TW lbs     PHYSICAL EXAM  GENERAL: No acute distress  HEENT: Normocephalic, atraumatic  NEURO: Alert and oriented x3  PULMONARY: Non-labored respiration; no tachypnea  ABD: Soft, gravid, nontender.    ASSESSMENT AND PLAN  There are no diagnoses linked to this encounter.     Delivery consents signed   Reviewed GBS neg and need for intrapartum abx   Reviewed repeat 3rd trimester HIV/RPR - will get today   Education regarding labor precautions.   Reviewed warning signs, normal FM, and how/when to call.      Follow-up: 1 week, call or present sooner PRN    Ching Juares CNM/JACQUES

## 2023-08-31 ENCOUNTER — PATIENT MESSAGE (OUTPATIENT)
Dept: OBSTETRICS AND GYNECOLOGY | Facility: CLINIC | Age: 38
End: 2023-08-31
Payer: COMMERCIAL

## 2023-09-01 ENCOUNTER — PATIENT MESSAGE (OUTPATIENT)
Dept: OBSTETRICS AND GYNECOLOGY | Facility: CLINIC | Age: 38
End: 2023-09-01
Payer: COMMERCIAL

## 2023-09-06 ENCOUNTER — ROUTINE PRENATAL (OUTPATIENT)
Dept: OBSTETRICS AND GYNECOLOGY | Facility: CLINIC | Age: 38
End: 2023-09-06
Payer: COMMERCIAL

## 2023-09-06 VITALS
BODY MASS INDEX: 26.75 KG/M2 | SYSTOLIC BLOOD PRESSURE: 112 MMHG | DIASTOLIC BLOOD PRESSURE: 68 MMHG | WEIGHT: 155.88 LBS

## 2023-09-06 DIAGNOSIS — O09.529 ANTEPARTUM MULTIGRAVIDA OF ADVANCED MATERNAL AGE: ICD-10-CM

## 2023-09-06 DIAGNOSIS — Z3A.36 36 WEEKS GESTATION OF PREGNANCY: ICD-10-CM

## 2023-09-06 DIAGNOSIS — Z98.891 HISTORY OF LOW TRANSVERSE CESAREAN SECTION: Primary | ICD-10-CM

## 2023-09-06 PROCEDURE — 0502F SUBSEQUENT PRENATAL CARE: CPT | Mod: CPTII,S$GLB,,

## 2023-09-06 PROCEDURE — 0502F PR SUBSEQUENT PRENATAL CARE: ICD-10-PCS | Mod: CPTII,S$GLB,,

## 2023-09-06 PROCEDURE — 99999 PR PBB SHADOW E&M-EST. PATIENT-LVL II: CPT | Mod: PBBFAC,,,

## 2023-09-06 PROCEDURE — 99999 PR PBB SHADOW E&M-EST. PATIENT-LVL II: ICD-10-PCS | Mod: PBBFAC,,,

## 2023-09-06 NOTE — PROGRESS NOTES
38 y.o.,  at 38w1d presents today for routine OB appt.  Denies HA, visual disturbances, or upper abdominal pain/GI complaints. No regular UCs, denies LOF or VB. Reports good FM  Doing well without concerns.  Requests SVE today.     PHYSICAL EXAM  GENERAL: No acute distress  HEENT: Normocephalic, atraumatic  NEURO: Alert and oriented x3  PULMONARY: Non-labored respiration; no tachypnea  ABD: Soft, gravid, nontender.    ASSESSMENT AND PLAN  History of low transverse  section - considering TOLAC    36 weeks gestation of pregnancy    Antepartum multigravida of advanced maternal age          Delivery consents already signed   Discussed plans for support people during labor - patient plans to have  and .   Reviewed warning signs, normal FM, labor precautions, and how/when to call.      Follow-up: 1 week, call or present sooner JUN Guerra CNM, APRN

## 2023-09-11 RX ORDER — ASCORBIC ACID, CHOLECALCIFEROL, .ALPHA.-TOCOPHEROL ACETATE, DL-, PYRIDOXINE, FOLIC ACID, CYANOCOBALAMIN, CALCIUM, FERROUS FUMARATE, MAGNESIUM, DOCONEXENT 85; 200; 10; 25; 1; 12; 140; 27; 45; 300 [IU]/1; [IU]/1; [IU]/1; [IU]/1; MG/1; UG/1; MG/1; MG/1; MG/1; MG/1
1 CAPSULE, GELATIN COATED ORAL
Qty: 30 CAPSULE | Refills: 35 | Status: SHIPPED | OUTPATIENT
Start: 2023-09-11 | End: 2024-09-10

## 2023-09-12 ENCOUNTER — PATIENT MESSAGE (OUTPATIENT)
Dept: OBSTETRICS AND GYNECOLOGY | Facility: CLINIC | Age: 38
End: 2023-09-12
Payer: COMMERCIAL

## 2023-09-13 ENCOUNTER — ROUTINE PRENATAL (OUTPATIENT)
Dept: OBSTETRICS AND GYNECOLOGY | Facility: CLINIC | Age: 38
End: 2023-09-13
Payer: COMMERCIAL

## 2023-09-13 VITALS
SYSTOLIC BLOOD PRESSURE: 112 MMHG | BODY MASS INDEX: 26.91 KG/M2 | DIASTOLIC BLOOD PRESSURE: 70 MMHG | WEIGHT: 156.75 LBS

## 2023-09-13 DIAGNOSIS — O44.02 PLACENTA PREVIA IN SECOND TRIMESTER: ICD-10-CM

## 2023-09-13 DIAGNOSIS — O09.529 ANTEPARTUM MULTIGRAVIDA OF ADVANCED MATERNAL AGE: ICD-10-CM

## 2023-09-13 DIAGNOSIS — Z98.891 HISTORY OF LOW TRANSVERSE CESAREAN SECTION: ICD-10-CM

## 2023-09-13 DIAGNOSIS — Z3A.38 38 WEEKS GESTATION OF PREGNANCY: Primary | ICD-10-CM

## 2023-09-13 PROCEDURE — 0502F SUBSEQUENT PRENATAL CARE: CPT | Mod: CPTII,S$GLB,, | Performed by: ADVANCED PRACTICE MIDWIFE

## 2023-09-13 PROCEDURE — 99999 PR PBB SHADOW E&M-EST. PATIENT-LVL II: ICD-10-PCS | Mod: PBBFAC,,, | Performed by: ADVANCED PRACTICE MIDWIFE

## 2023-09-13 PROCEDURE — 0502F PR SUBSEQUENT PRENATAL CARE: ICD-10-PCS | Mod: CPTII,S$GLB,, | Performed by: ADVANCED PRACTICE MIDWIFE

## 2023-09-13 PROCEDURE — 99999 PR PBB SHADOW E&M-EST. PATIENT-LVL II: CPT | Mod: PBBFAC,,, | Performed by: ADVANCED PRACTICE MIDWIFE

## 2023-09-13 NOTE — PROGRESS NOTES
38 y.o.,  at 39w1d presents today for routine OB appt.  Denies HA, visual disturbances, or upper abdominal pain/GI complaints. No regular UCs, denies LOF or VB. Reports good FM  Doing well with report of mild sore throat this morning, no fever. Took allergy medicine and will monitor - will notify us if worsening.     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    PHYSICAL EXAM  GENERAL: No acute distress  HEENT: Normocephalic, atraumatic  NEURO: Alert and oriented x3  PULMONARY: Non-labored respiration; no tachypnea  ABD: Soft, gravid, nontender.    ASSESSMENT AND PLAN  38 weeks gestation of pregnancy    Placenta previa in second trimester - RESOLVED    Antepartum multigravida of advanced maternal age    History of low transverse  section - considering TOLAC       Has IOL scheduled  at 5pm   Delivery consents have been signed last week, but error in scanning and not entire consent uploaded. Resigned and discussed again today. All questions answered.   Discussed plans for support people during labor - patient plans to have Toi Davis.   Reviewed warning signs, normal FM, labor precautions, and how/when to call.      Follow-up: 1 week, call or present sooner NOE Oconnor SNM Megan C Mackey, CNM, GINGER Lira

## 2023-09-18 ENCOUNTER — PATIENT MESSAGE (OUTPATIENT)
Dept: OBSTETRICS AND GYNECOLOGY | Facility: CLINIC | Age: 38
End: 2023-09-18
Payer: COMMERCIAL

## 2023-09-18 ENCOUNTER — ANESTHESIA EVENT (OUTPATIENT)
Dept: OBSTETRICS AND GYNECOLOGY | Facility: OTHER | Age: 38
End: 2023-09-18
Payer: COMMERCIAL

## 2023-09-18 ENCOUNTER — HOSPITAL ENCOUNTER (INPATIENT)
Facility: OTHER | Age: 38
LOS: 2 days | Discharge: HOME | End: 2023-09-20
Attending: OBSTETRICS & GYNECOLOGY | Admitting: OBSTETRICS & GYNECOLOGY
Payer: COMMERCIAL

## 2023-09-18 ENCOUNTER — ANESTHESIA (OUTPATIENT)
Dept: OBSTETRICS AND GYNECOLOGY | Facility: OTHER | Age: 38
End: 2023-09-18
Payer: COMMERCIAL

## 2023-09-18 ENCOUNTER — TELEPHONE (OUTPATIENT)
Dept: OBSTETRICS AND GYNECOLOGY | Facility: HOSPITAL | Age: 38
End: 2023-09-18
Payer: COMMERCIAL

## 2023-09-18 DIAGNOSIS — O42.92 FULL-TERM PREMATURE RUPTURE OF MEMBRANES, UNSPECIFIED DURATION TO ONSET OF LABOR: ICD-10-CM

## 2023-09-18 DIAGNOSIS — O42.90 PROM (PREMATURE RUPTURE OF MEMBRANES): ICD-10-CM

## 2023-09-18 DIAGNOSIS — O42.92 FULL-TERM PREMATURE RUPTURE OF MEMBRANES: ICD-10-CM

## 2023-09-18 DIAGNOSIS — O34.219 VBAC, DELIVERED: Primary | ICD-10-CM

## 2023-09-18 PROBLEM — O13.3 GESTATIONAL HYPERTENSION, THIRD TRIMESTER: Status: ACTIVE | Noted: 2023-09-18

## 2023-09-18 PROBLEM — O16.3 ELEVATED BLOOD PRESSURE AFFECTING PREGNANCY IN THIRD TRIMESTER, ANTEPARTUM: Status: ACTIVE | Noted: 2023-09-18

## 2023-09-18 LAB
ABO + RH BLD: NORMAL
ALBUMIN SERPL BCP-MCNC: 2.7 G/DL (ref 3.5–5.2)
ALP SERPL-CCNC: 163 U/L (ref 55–135)
ALT SERPL W/O P-5'-P-CCNC: 17 U/L (ref 10–44)
ANION GAP SERPL CALC-SCNC: 8 MMOL/L (ref 8–16)
AST SERPL-CCNC: 46 U/L (ref 10–40)
BASOPHILS # BLD AUTO: 0.03 K/UL (ref 0–0.2)
BASOPHILS NFR BLD: 0.4 % (ref 0–1.9)
BILIRUB SERPL-MCNC: 0.4 MG/DL (ref 0.1–1)
BLD GP AB SCN CELLS X3 SERPL QL: NORMAL
BUN SERPL-MCNC: 20 MG/DL (ref 6–20)
CALCIUM SERPL-MCNC: 10.2 MG/DL (ref 8.7–10.5)
CHLORIDE SERPL-SCNC: 109 MMOL/L (ref 95–110)
CO2 SERPL-SCNC: 19 MMOL/L (ref 23–29)
CREAT SERPL-MCNC: 0.7 MG/DL (ref 0.5–1.4)
CREAT UR-MCNC: 52.2 MG/DL (ref 15–325)
DIFFERENTIAL METHOD: ABNORMAL
EOSINOPHIL # BLD AUTO: 0.1 K/UL (ref 0–0.5)
EOSINOPHIL NFR BLD: 0.8 % (ref 0–8)
ERYTHROCYTE [DISTWIDTH] IN BLOOD BY AUTOMATED COUNT: 13 % (ref 11.5–14.5)
EST. GFR  (NO RACE VARIABLE): >60 ML/MIN/1.73 M^2
GLUCOSE SERPL-MCNC: 90 MG/DL (ref 70–110)
HCT VFR BLD AUTO: 36.8 % (ref 37–48.5)
HGB BLD-MCNC: 12.5 G/DL (ref 12–16)
HIV 1+2 AB+HIV1 P24 AG SERPL QL IA: NEGATIVE
IMM GRANULOCYTES # BLD AUTO: 0.02 K/UL (ref 0–0.04)
IMM GRANULOCYTES NFR BLD AUTO: 0.3 % (ref 0–0.5)
LYMPHOCYTES # BLD AUTO: 1.4 K/UL (ref 1–4.8)
LYMPHOCYTES NFR BLD: 19.2 % (ref 18–48)
MCH RBC QN AUTO: 31.1 PG (ref 27–31)
MCHC RBC AUTO-ENTMCNC: 34 G/DL (ref 32–36)
MCV RBC AUTO: 92 FL (ref 82–98)
MONOCYTES # BLD AUTO: 0.5 K/UL (ref 0.3–1)
MONOCYTES NFR BLD: 7.1 % (ref 4–15)
NEUTROPHILS # BLD AUTO: 5.4 K/UL (ref 1.8–7.7)
NEUTROPHILS NFR BLD: 72.2 % (ref 38–73)
NRBC BLD-RTO: 0 /100 WBC
PLATELET # BLD AUTO: 126 K/UL (ref 150–450)
PMV BLD AUTO: 13.6 FL (ref 9.2–12.9)
POTASSIUM SERPL-SCNC: 4.4 MMOL/L (ref 3.5–5.1)
PROT SERPL-MCNC: 6.2 G/DL (ref 6–8.4)
PROT UR-MCNC: 30 MG/DL (ref 0–15)
PROT/CREAT UR: 0.57 MG/G{CREAT} (ref 0–0.2)
RBC # BLD AUTO: 4.02 M/UL (ref 4–5.4)
RPR SER QL: NORMAL
SODIUM SERPL-SCNC: 136 MMOL/L (ref 136–145)
SPECIMEN OUTDATE: NORMAL
WBC # BLD AUTO: 7.46 K/UL (ref 3.9–12.7)

## 2023-09-18 PROCEDURE — 99284 PR EMERGENCY DEPT VISIT,LEVEL IV: ICD-10-PCS | Mod: 25,,, | Performed by: OBSTETRICS & GYNECOLOGY

## 2023-09-18 PROCEDURE — 11000001 HC ACUTE MED/SURG PRIVATE ROOM

## 2023-09-18 PROCEDURE — 99285 EMERGENCY DEPT VISIT HI MDM: CPT | Mod: 25

## 2023-09-18 PROCEDURE — 59025 FETAL NON-STRESS TEST: CPT | Mod: 26,,, | Performed by: OBSTETRICS & GYNECOLOGY

## 2023-09-18 PROCEDURE — 99284 EMERGENCY DEPT VISIT MOD MDM: CPT | Mod: 25,,, | Performed by: OBSTETRICS & GYNECOLOGY

## 2023-09-18 PROCEDURE — 27200710 HC EPIDURAL INFUSION PUMP SET: Performed by: ANESTHESIOLOGY

## 2023-09-18 PROCEDURE — 85025 COMPLETE CBC W/AUTO DIFF WBC: CPT | Performed by: OBSTETRICS & GYNECOLOGY

## 2023-09-18 PROCEDURE — 86850 RBC ANTIBODY SCREEN: CPT | Performed by: OBSTETRICS & GYNECOLOGY

## 2023-09-18 PROCEDURE — 86592 SYPHILIS TEST NON-TREP QUAL: CPT | Performed by: OBSTETRICS & GYNECOLOGY

## 2023-09-18 PROCEDURE — 87389 HIV-1 AG W/HIV-1&-2 AB AG IA: CPT | Performed by: OBSTETRICS & GYNECOLOGY

## 2023-09-18 PROCEDURE — C1751 CATH, INF, PER/CENT/MIDLINE: HCPCS | Performed by: ANESTHESIOLOGY

## 2023-09-18 PROCEDURE — 62326 NJX INTERLAMINAR LMBR/SAC: CPT

## 2023-09-18 PROCEDURE — 59025 PR FETAL 2N-STRESS TEST: ICD-10-PCS | Mod: 26,,, | Performed by: OBSTETRICS & GYNECOLOGY

## 2023-09-18 PROCEDURE — 63600175 PHARM REV CODE 636 W HCPCS

## 2023-09-18 PROCEDURE — 59610 PRA ROUT OB CARE,VAG DELIV,PREV C-SEC: ICD-10-PCS | Mod: AA,P2,GC, | Performed by: ANESTHESIOLOGY

## 2023-09-18 PROCEDURE — 25000003 PHARM REV CODE 250

## 2023-09-18 PROCEDURE — 80053 COMPREHEN METABOLIC PANEL: CPT | Performed by: OBSTETRICS & GYNECOLOGY

## 2023-09-18 PROCEDURE — 84156 ASSAY OF PROTEIN URINE: CPT

## 2023-09-18 PROCEDURE — 25000003 PHARM REV CODE 250: Performed by: ANESTHESIOLOGY

## 2023-09-18 RX ORDER — CARBOPROST TROMETHAMINE 250 UG/ML
250 INJECTION, SOLUTION INTRAMUSCULAR
Status: DISCONTINUED | OUTPATIENT
Start: 2023-09-18 | End: 2023-09-19

## 2023-09-18 RX ORDER — METHYLERGONOVINE MALEATE 0.2 MG/ML
200 INJECTION INTRAVENOUS
Status: DISCONTINUED | OUTPATIENT
Start: 2023-09-18 | End: 2023-09-19

## 2023-09-18 RX ORDER — FENTANYL/BUPIVACAINE/NS/PF 2MCG/ML-.1
PLASTIC BAG, INJECTION (ML) INJECTION
Status: COMPLETED
Start: 2023-09-18 | End: 2023-09-18

## 2023-09-18 RX ORDER — SODIUM CHLORIDE 9 MG/ML
INJECTION, SOLUTION INTRAVENOUS
Status: DISCONTINUED | OUTPATIENT
Start: 2023-09-18 | End: 2023-09-19

## 2023-09-18 RX ORDER — ONDANSETRON 8 MG/1
8 TABLET, ORALLY DISINTEGRATING ORAL EVERY 8 HOURS PRN
Status: DISCONTINUED | OUTPATIENT
Start: 2023-09-18 | End: 2023-09-19

## 2023-09-18 RX ORDER — TRANEXAMIC ACID 10 MG/ML
1000 INJECTION, SOLUTION INTRAVENOUS ONCE AS NEEDED
Status: DISCONTINUED | OUTPATIENT
Start: 2023-09-18 | End: 2023-09-19

## 2023-09-18 RX ORDER — MISOPROSTOL 200 UG/1
800 TABLET ORAL ONCE AS NEEDED
Status: DISCONTINUED | OUTPATIENT
Start: 2023-09-18 | End: 2023-09-19

## 2023-09-18 RX ORDER — CALCIUM CARBONATE 200(500)MG
500 TABLET,CHEWABLE ORAL 3 TIMES DAILY PRN
Status: DISCONTINUED | OUTPATIENT
Start: 2023-09-18 | End: 2023-09-19

## 2023-09-18 RX ORDER — OXYTOCIN/RINGER'S LACTATE 30/500 ML
0-30 PLASTIC BAG, INJECTION (ML) INTRAVENOUS CONTINUOUS
Status: DISCONTINUED | OUTPATIENT
Start: 2023-09-18 | End: 2023-09-19

## 2023-09-18 RX ORDER — MISOPROSTOL 200 UG/1
TABLET ORAL
Status: DISCONTINUED
Start: 2023-09-18 | End: 2023-09-19 | Stop reason: WASHOUT

## 2023-09-18 RX ORDER — SIMETHICONE 80 MG
1 TABLET,CHEWABLE ORAL 4 TIMES DAILY PRN
Status: DISCONTINUED | OUTPATIENT
Start: 2023-09-18 | End: 2023-09-19

## 2023-09-18 RX ORDER — PROCHLORPERAZINE EDISYLATE 5 MG/ML
5 INJECTION INTRAMUSCULAR; INTRAVENOUS EVERY 6 HOURS PRN
Status: DISCONTINUED | OUTPATIENT
Start: 2023-09-18 | End: 2023-09-19

## 2023-09-18 RX ORDER — LIDOCAINE HYDROCHLORIDE AND EPINEPHRINE 15; 5 MG/ML; UG/ML
INJECTION, SOLUTION EPIDURAL
Status: DISCONTINUED | OUTPATIENT
Start: 2023-09-18 | End: 2023-09-19

## 2023-09-18 RX ORDER — OXYTOCIN/RINGER'S LACTATE 30/500 ML
334 PLASTIC BAG, INJECTION (ML) INTRAVENOUS ONCE
Status: DISCONTINUED | OUTPATIENT
Start: 2023-09-18 | End: 2023-09-19

## 2023-09-18 RX ORDER — FENTANYL/BUPIVACAINE/NS/PF 2MCG/ML-.1
PLASTIC BAG, INJECTION (ML) INJECTION CONTINUOUS
Status: CANCELLED | OUTPATIENT
Start: 2023-09-18

## 2023-09-18 RX ORDER — OXYTOCIN 10 [USP'U]/ML
INJECTION, SOLUTION INTRAMUSCULAR; INTRAVENOUS
Status: DISCONTINUED
Start: 2023-09-18 | End: 2023-09-19 | Stop reason: WASHOUT

## 2023-09-18 RX ORDER — OXYTOCIN/RINGER'S LACTATE 30/500 ML
95 PLASTIC BAG, INJECTION (ML) INTRAVENOUS ONCE AS NEEDED
Status: DISCONTINUED | OUTPATIENT
Start: 2023-09-18 | End: 2023-09-19

## 2023-09-18 RX ORDER — OXYTOCIN 10 [USP'U]/ML
10 INJECTION, SOLUTION INTRAMUSCULAR; INTRAVENOUS ONCE AS NEEDED
Status: DISCONTINUED | OUTPATIENT
Start: 2023-09-18 | End: 2023-09-19

## 2023-09-18 RX ORDER — FAMOTIDINE 10 MG/ML
20 INJECTION INTRAVENOUS ONCE
Status: CANCELLED | OUTPATIENT
Start: 2023-09-18 | End: 2023-09-18

## 2023-09-18 RX ORDER — OXYTOCIN/RINGER'S LACTATE 30/500 ML
334 PLASTIC BAG, INJECTION (ML) INTRAVENOUS ONCE AS NEEDED
Status: DISCONTINUED | OUTPATIENT
Start: 2023-09-18 | End: 2023-09-19

## 2023-09-18 RX ORDER — TERBUTALINE SULFATE 1 MG/ML
INJECTION SUBCUTANEOUS
Status: DISPENSED
Start: 2023-09-18 | End: 2023-09-19

## 2023-09-18 RX ORDER — METHYLERGONOVINE MALEATE 0.2 MG/ML
INJECTION INTRAVENOUS
Status: DISCONTINUED
Start: 2023-09-18 | End: 2023-09-19 | Stop reason: WASHOUT

## 2023-09-18 RX ORDER — LIDOCAINE HYDROCHLORIDE 10 MG/ML
10 INJECTION INFILTRATION; PERINEURAL ONCE AS NEEDED
Status: DISCONTINUED | OUTPATIENT
Start: 2023-09-18 | End: 2023-09-19

## 2023-09-18 RX ORDER — DIPHENOXYLATE HYDROCHLORIDE AND ATROPINE SULFATE 2.5; .025 MG/1; MG/1
2 TABLET ORAL EVERY 6 HOURS PRN
Status: DISCONTINUED | OUTPATIENT
Start: 2023-09-18 | End: 2023-09-19

## 2023-09-18 RX ORDER — FENTANYL/BUPIVACAINE/NS/PF 2MCG/ML-.1
PLASTIC BAG, INJECTION (ML) INJECTION
Status: DISCONTINUED | OUTPATIENT
Start: 2023-09-18 | End: 2023-09-19

## 2023-09-18 RX ORDER — SODIUM CHLORIDE, SODIUM LACTATE, POTASSIUM CHLORIDE, CALCIUM CHLORIDE 600; 310; 30; 20 MG/100ML; MG/100ML; MG/100ML; MG/100ML
INJECTION, SOLUTION INTRAVENOUS CONTINUOUS
Status: DISCONTINUED | OUTPATIENT
Start: 2023-09-18 | End: 2023-09-19

## 2023-09-18 RX ORDER — SODIUM CITRATE AND CITRIC ACID MONOHYDRATE 334; 500 MG/5ML; MG/5ML
30 SOLUTION ORAL ONCE
Status: CANCELLED | OUTPATIENT
Start: 2023-09-18 | End: 2023-09-18

## 2023-09-18 RX ORDER — BUPIVACAINE HYDROCHLORIDE 2.5 MG/ML
INJECTION, SOLUTION EPIDURAL; INFILTRATION; INTRACAUDAL
Status: DISCONTINUED | OUTPATIENT
Start: 2023-09-18 | End: 2023-09-18

## 2023-09-18 RX ORDER — OXYTOCIN/RINGER'S LACTATE 30/500 ML
95 PLASTIC BAG, INJECTION (ML) INTRAVENOUS ONCE
Status: DISCONTINUED | OUTPATIENT
Start: 2023-09-18 | End: 2023-09-19

## 2023-09-18 RX ADMIN — SODIUM CHLORIDE, POTASSIUM CHLORIDE, SODIUM LACTATE AND CALCIUM CHLORIDE 1000 ML: 600; 310; 30; 20 INJECTION, SOLUTION INTRAVENOUS at 04:09

## 2023-09-18 RX ADMIN — Medication 2.2 ML: at 05:09

## 2023-09-18 RX ADMIN — SODIUM CHLORIDE, POTASSIUM CHLORIDE, SODIUM LACTATE AND CALCIUM CHLORIDE: 600; 310; 30; 20 INJECTION, SOLUTION INTRAVENOUS at 11:09

## 2023-09-18 RX ADMIN — Medication 8 ML/HR: at 05:09

## 2023-09-18 RX ADMIN — Medication 2 MILLI-UNITS/MIN: at 10:09

## 2023-09-18 RX ADMIN — LIDOCAINE HYDROCHLORIDE,EPINEPHRINE BITARTRATE 3 ML: 15; .005 INJECTION, SOLUTION EPIDURAL; INFILTRATION; INTRACAUDAL; PERINEURAL at 05:09

## 2023-09-18 RX ADMIN — ONDANSETRON 8 MG: 8 TABLET, ORALLY DISINTEGRATING ORAL at 04:09

## 2023-09-18 NOTE — PLAN OF CARE
23 1300   OB SCREEN   Assessment Type Discharge Planning Brief Assessment   Source of Information health record   Received Prenatal Care Yes   Any indications/suspicions for Substance use/disorder  (Per H&P, pt has a hx of THC use. No toxicology screening. Should pt or  have tox screens, please notify social work.)   Is this a teen pregnancy No   Is the baby in NICU No   Indication for adoption/Safe Haven No   Indication for DME/post-acute needs No   HIV (+) No   Any congenital  disorders No   Fetal demise/ death No     Patient has been screened for Social Work discharge planning needs. Based on documentation in medical record, no discharge planning needs are anticipated at this time. Should any discharge planning needs arise, please consult .

## 2023-09-18 NOTE — HOSPITAL COURSE
3/70/-2 @ 1100, ROM confirmed by + ferning and + nitrazine on SSE  Admit to L&D  Discussed patient admit with Dr. Dean and Dr. Alfonso  Lengthy discussion of PROM at term given previous . Loren opts for expectant management for 4 hours and would like to avoid Pitocin. Discussed current recommendation for augmentation given PROM and history of prolonged ROM/chorioamnionitis. Discussed we do not recommend prolonging labor given previous  section and likely need for eventual augmentation. She agrees to plan to expectantly manage PROM and recheck cervical dilation in 4 hours. She understands if she desires augmentation prior to this, low-dose pitocin will be ordered.  Epidural per anesthesia at patient request  CEFM, peripheral IV  Pre-E labs drawn due to mild range BP in [facility]    23 0830 Pt doing well, breastfeeding with baby at bedside. Reviewed PCR and low platelets with severe range BPT x 2 meets criteria for pre-e; pt is asymptomatic, denies HA, chest pain, vision changes. MD resident to discuss mag protocol with patient and follow

## 2023-09-18 NOTE — PROGRESS NOTES
"LABOR NOTE    S:  Pt standing and breathing through contractions. Family at bedside and supportive.     O: /82   Pulse 66   Temp 98.3 °F (36.8 °C) (Oral)   Resp 18   Ht 5' 4" (1.626 m)   Wt 71.1 kg (156 lb 12 oz)   LMP 2022   SpO2 99%   Breastfeeding Yes   BMI 26.91 kg/m²     GENERAL: Calm and appropriate affect  NEURO: Alert, oriented, normal speech  ABDOMEN: Nontender, Fundus palpates soft between UC's.  FHT: Baseline 145, moderate variability, positive accels, no decels. Cat 1, reassuring.  CTX: q 2-4 minutes  SVE: 3/70/-2, AROM forebag      ASSESSMENT:   38 y.o.  IUP at 39w6d, FHT reassuring/ Cat 1    Patient Active Problem List   Diagnosis    Postpartum anxiety    Pelvic floor dysfunction    Other lack of coordination    Muscle weakness    Elevated liver enzymes    History of low transverse  section - considering TOLAC    Pregnancy    Antepartum multigravida of advanced maternal age    Diastasis of rectus abdominis    Lack of coordination    Placenta previa in second trimester - RESOLVED    COVID-19 affecting pregnancy in second trimester    Full-term premature rupture of membranes    Elevated blood pressure affecting pregnancy in third trimester, antepartum     PLAN:  Continue close Maternal/Fetal monitoring  AROM forebag with patient consent. She continues to desire expectant management. Discussed recommendation for augmentation; she will reconsider after next exam if unchanged.  Recheck 2 hours or PRN  Epidural per anesthesia at pt's request        "

## 2023-09-18 NOTE — ASSESSMENT & PLAN NOTE
CEFM  Clear liquid diet  Desires epidural during labor (unsure of when)  Admission  calculator 55.7%  Dr. Alfonso updated on patient status

## 2023-09-18 NOTE — SUBJECTIVE & OBJECTIVE
Obstetric HPI:  Patient reports no perceived contractions, active fetal movement, No vaginal bleeding , Yes loss of fluid     This pregnancy has been complicated by LTCS, elevated LFT, resolved placenta previa, and history of chorioamnionitis    OB History    Para Term  AB Living   3 1 1 0 1 1   SAB IAB Ectopic Multiple Live Births   0 1 0 0 1      # Outcome Date GA Lbr Rayo/2nd Weight Sex Delivery Anes PTL Lv   3 Current            2 Term 21 41w3d  3.56 kg (7 lb 13.6 oz) F CS-LTranv EPI N JAMIE      Complications: Chorioamnionitis      Name: RAIMUNDO LI      Apgar1: 4  Apgar5: 7   1 IAB 07/12/10 6w0d             Obstetric Comments   Menarche age 12     Past Medical History:   Diagnosis Date    Migraines 2020    Only during the beginning of pregnancy    PROM (premature rupture of membranes) 2021     Past Surgical History:   Procedure Laterality Date     SECTION N/A 2021    Procedure:  SECTION;  Surgeon: Jacquelyn Jesus MD;  Location: [facility] L[facility];  Service: OB/GYN;  Laterality: N/A;     SECTION      WISDOM TOOTH EXTRACTION      No complications       PTA Medications   Medication Sig    EScitalopram oxalate (LEXAPRO) 5 MG Tab TAKE 1 TABLET BY MOUTH EVERY DAY    LORazepam (ATIVAN) 0.5 MG tablet Take 0.5 mg by mouth daily as needed.    PNV-DHA 27 mg iron-1 mg -300 mg Cap TAKE 1 CAPSULE BY MOUTH ONCE DAILY AT 6AM.       Review of patient's allergies indicates:  No Known Allergies     Family History       Problem Relation (Age of Onset)    Emphysema Maternal Grandmother, Paternal Grandfather    Fibromyalgia Mother    Hypertension Mother    No Known Problems Father, Sister, Maternal Grandfather, Paternal Grandmother, Sister          Tobacco Use    Smoking status: Never    Smokeless tobacco: Never   Substance and Sexual Activity    Alcohol use: Not Currently     Comment: intermittent few times month, 1-5 servings at a time    Drug use: Yes     Types: Marijuana      Comment: past, none during pregnancy    Sexual activity: Yes     Partners: Male     Birth control/protection: None     Comment: Ryan     Review of Systems   Constitutional:  Negative for diaphoresis and fever.   Gastrointestinal:  Negative for abdominal pain.   Genitourinary:  Positive for vaginal discharge.        Leakage of fluid from vagina      Objective:     Vital Signs (Most Recent):  Temp: 98.3 °F (36.8 °C) (09/18/23 1054)  Pulse: 83 (09/18/23 1109)  Resp: 18 (09/18/23 1109)  BP: 131/89 (09/18/23 1109)  SpO2: 99 % (09/18/23 1109) Vital Signs (24h Range):  Temp:  [98.3 °F (36.8 °C)] 98.3 °F (36.8 °C)  Pulse:  [73-85] 83  Resp:  [18] 18  SpO2:  [97 %-99 %] 99 %  BP: (130-140)/(89-95) 131/89     Weight: 71.1 kg (156 lb 12 oz)  Body mass index is 26.91 kg/m².    FHT: 140 Cat 1 (reassuring)  TOCO:  Q 2-3 minutes     Physical Exam:   Constitutional: She is oriented to person, place, and time. She appears well-developed and well-nourished.    HENT:   Head: Normocephalic and atraumatic.    Eyes: Pupils are equal, round, and reactive to light. EOM are normal.     Cardiovascular:  Normal rate.             Pulmonary/Chest: Effort normal.        Abdominal: Soft.     Genitourinary:    Vagina normal.      Genitourinary Comments: + nitrazine, + ferning on SSE             Musculoskeletal: Normal range of motion.       Neurological: She is alert and oriented to person, place, and time.    Skin: Skin is warm and dry.    Psychiatric: She has a normal mood and affect. Her behavior is normal. Judgment and thought content normal.        Cervix:  Dilation:  3  Effacement:  70  Station: -2   Presentation: Vertex by BSUS     Significant Labs:  Lab Results   Component Value Date    GROUPTRH O POS 02/14/2023    HEPBSAG Non-reactive 02/14/2023    STREPBCULT No Group B Streptococcus isolated 08/23/2023       I have personallly reviewed all pertinent lab results from the last 24 hours.  Recent Lab Results       None

## 2023-09-18 NOTE — PROGRESS NOTES
"LABOR NOTE    To bedside for FHR prolonged decel    S:  Pt resting comfortably with epidural, no complaints.  Family at bedside and supportive.     O: /74   Pulse 64   Temp 97.8 °F (36.6 °C) (Oral)   Resp 18   Ht 5' 4" (1.626 m)   Wt 71.1 kg (156 lb 12 oz)   LMP 2022   SpO2 99%   Breastfeeding Yes   BMI 26.91 kg/m²     GENERAL: Calm and appropriate affect  NEURO: Alert, oriented, normal speech  ABDOMEN: Nontender, Fundus palpates soft between UC's.  FHT: Baseline 140, moderate variability, positive accels, 2 minute prolonged decel at 1844 down to 90 BPM, returned to baseline x1 minute then followed by 5 minute prolonged decel down to 90 BPM resolved by repositioning. Cat 2, reassuring. Moderate variability with accelerations noted following repositioning.  CTX: q 2-4 minutes  SVE: /-2      ASSESSMENT:   38 y.o.  IUP at 39w6d, FHT reassuring/ Cat 2    Patient Active Problem List   Diagnosis    Postpartum anxiety    Pelvic floor dysfunction    Other lack of coordination    Muscle weakness    Elevated liver enzymes    History of low transverse  section - considering TOLAC    Pregnancy    Antepartum multigravida of advanced maternal age    Diastasis of rectus abdominis    Lack of coordination    Placenta previa in second trimester - RESOLVED    COVID-19 affecting pregnancy in second trimester    Full-term premature rupture of membranes    Elevated blood pressure affecting pregnancy in third trimester, antepartum    Gestational hypertension, third trimester         PLAN:  Continue close Maternal/Fetal monitoring  Suspect rapid cervical change as cause for prolonged decel; no palpable umbilical cord on vaginal exam  Pitocin previously ordered but not yet started; delay starting pitocin until cat 1 tracing x30 minutes  IV fluid bolus x500cc  Terbutaline at bedside PRN  Recheck 2 hours or PRN  Care reported to VISHNU Guerra CNM    "

## 2023-09-18 NOTE — ASSESSMENT & PLAN NOTE
PROM @ 0430, clear fluid  CEFM  +nitrazine, + ferning on SSE  3/70/-2 @1100  Expectant management until recheck SVE at 1500

## 2023-09-18 NOTE — ANESTHESIA PROCEDURE NOTES
CSE    Patient location during procedure: OB  Start time: 9/18/2023 5:00 PM  Timeout: 9/18/2023 4:58 PM  End time: 9/18/2023 5:12 PM    Reason for block: labor analgesia requested by patient and obstetrician    Staffing  Authorizing Provider: Elzbieta Russo MD  Performing Provider: Dat Loredo DO    Staffing  Performed by: Dat Loredo DO  Authorized by: Elzbieta Russo MD    Preanesthetic Checklist  Completed: patient identified, IV checked, site marked, risks and benefits discussed, surgical consent, monitors and equipment checked, pre-op evaluation and timeout performed  CSE  Patient position: sitting  Prep: ChloraPrep  Patient monitoring: heart rate, continuous pulse ox and frequent blood pressure checks  Approach: midline  Spinal Needle  Needle type: pencil-tip   Needle gauge: 25 G  Needle length: 5 in  Epidural Needle  Injection technique: TRAN air  Needle type: Tuohy   Needle gauge: 17 G  Needle length: 3.5 in  Needle insertion depth: 5.5 cm  Location: L3-4  Needle localization: anatomical landmarks   Catheter  Catheter type: [employer]  Catheter size: 19 G  Catheter at skin depth: 10 cm  Additional Documentation: incremental injection, negative aspiration for CSF, negative aspiration for heme and no paresthesia on injection

## 2023-09-18 NOTE — PLAN OF CARE
Problem:  Fall Injury Risk  Goal: Absence of Fall, Infant Drop and Related Injury  Outcome: Ongoing, Progressing     Problem: Adult Inpatient Plan of Care  Goal: Plan of Care Review  Outcome: Ongoing, Progressing  Goal: Patient-Specific Goal (Individualized)  Outcome: Ongoing, Progressing  Goal: Absence of Hospital-Acquired Illness or Injury  Outcome: Ongoing, Progressing  Goal: Optimal Comfort and Wellbeing  Outcome: Ongoing, Progressing  Goal: Readiness for Transition of Care  Outcome: Ongoing, Progressing     Problem: Infection  Goal: Absence of Infection Signs and Symptoms  Outcome: Ongoing, Progressing     Problem: Bleeding (Labor)  Goal: Hemostasis  Outcome: Ongoing, Progressing     Problem: Change in Fetal Wellbeing (Labor)  Goal: Stable Fetal Wellbeing  Outcome: Ongoing, Progressing     Problem: Delayed Labor Progression (Labor)  Goal: Effective Progression to Delivery  Outcome: Ongoing, Progressing     Problem: Infection (Labor)  Goal: Absence of Infection Signs and Symptoms  Outcome: Ongoing, Progressing     Problem: Labor Pain (Labor)  Goal: Acceptable Pain Control  Outcome: Ongoing, Progressing     Problem: Uterine Tachysystole (Labor)  Goal: Normal Uterine Contraction Pattern  Outcome: Ongoing, Progressing     Problem: Breastfeeding  Goal: Effective Breastfeeding  Outcome: Ongoing, Progressing

## 2023-09-18 NOTE — TELEPHONE ENCOUNTER
Pt called with c/o leakage of fluid. Discussed recommendation to come in to [facility] due to history of . Pt verbalized understanding.

## 2023-09-18 NOTE — PROGRESS NOTES
"LABOR NOTE    S:  Pt breathing through contractions with epidural.  Family at bedside and supportive.     O: BP (!) 140/68   Pulse (!) 56   Temp 97.4 °F (36.3 °C) (Oral)   Resp 18   Ht 5' 4" (1.626 m)   Wt 71.1 kg (156 lb 12 oz)   LMP 2022   SpO2 99%   Breastfeeding Yes   BMI 26.91 kg/m²     GENERAL: Calm and appropriate affect  NEURO: Alert, oriented, normal speech  ABDOMEN: Nontender, Fundus palpates soft between UC's.  FHT: Baseline 130, moderate variability, positive accels, no decels. Cat 1, reassuring.  CTX: q 2-4 minutes  SVE: /-2    ASSESSMENT:   38 y.o.  IUP at 39w6d, FHT reassuring/ Cat 1    Patient Active Problem List   Diagnosis    Postpartum anxiety    Pelvic floor dysfunction    Other lack of coordination    Muscle weakness    Elevated liver enzymes    History of low transverse  section - considering TOLAC    Pregnancy    Antepartum multigravida of advanced maternal age    Diastasis of rectus abdominis    Lack of coordination    Placenta previa in second trimester - RESOLVED    COVID-19 affecting pregnancy in second trimester    Full-term premature rupture of membranes    Elevated blood pressure affecting pregnancy in third trimester, antepartum    Gestational hypertension, third trimester     PLAN:  Continue close Maternal/Fetal monitoring  Pitocin recommended again due to PROM. Pt desires to continue thinking about it. Ordered to MAR  Recheck 2-4 hours or PRN    "

## 2023-09-18 NOTE — ANESTHESIA PREPROCEDURE EVALUATION
[facility]  Anesthesia Pre-Operative Evaluation         Patient Name: Loren Dickerson  YOB: 1985  MRN: [id number]    2023      Loren Dickerson is a 38 y.o. female  @ 39w6d who presents for IOL (midwife). This IUP complicated by LTCS x1 (arrest of descent, chorioamnionitis), postpartum anxiety, AMA, and resolved placenta previa. Denies cardiopulmonary sxs, coagulopathies, or spinal pathologies. Denies previous problems with neuraxial anesthesia.     OB History    Para Term  AB Living   3 1 1   1 1   SAB IAB Ectopic Multiple Live Births     1   0 1      # Outcome Date GA Lbr Rayo/2nd Weight Sex Delivery Anes PTL Lv   3 Current            2 Term 21 41w3d  3.56 kg (7 lb 13.6 oz) F CS-LTranv EPI N JAMIE      Complications: Chorioamnionitis   1 IAB 07/12/10 6w0d             Obstetric Comments   Menarche age 12       Review of patient's allergies indicates:  No Known Allergies    Wt Readings from Last 1 Encounters:   23 1109 71.1 kg (156 lb 12 oz)       BP Readings from Last 3 Encounters:   23 131/89   23 112/70   23 112/68       Patient Active Problem List   Diagnosis    Postpartum anxiety    Pelvic floor dysfunction    Other lack of coordination    Muscle weakness    Elevated liver enzymes    History of low transverse  section - considering TOLAC    Pregnancy    Antepartum multigravida of advanced maternal age    Diastasis of rectus abdominis    Lack of coordination    Placenta previa in second trimester - RESOLVED    COVID-19 affecting pregnancy in second trimester    Full-term premature rupture of membranes    Elevated blood pressure affecting pregnancy in third trimester, antepartum       Past Surgical History:   Procedure Laterality Date     SECTION N/A 2021    Procedure:  SECTION;  Surgeon: Jacquelyn Jesus MD;  [facility];  Service: OB/GYN;  Laterality: N/A;      SECTION      WISDOM TOOTH EXTRACTION      No complications       Social History     Socioeconomic History    Marital status:      Spouse name: Ryan    Number of children: 0    Years of education: 19    Highest education level: Master's degree (e.g., MA, MS, [place], MEd, MSW, BEATRICE)   Occupational History    Occupation: yoga,      Employer: [employer]   Tobacco Use    Smoking status: Never    Smokeless tobacco: Never   Substance and Sexual Activity    Alcohol use: Not Currently     Comment: intermittent few times month, 1-5 servings at a time    Drug use: Yes     Types: Marijuana     Comment: past, none during pregnancy    Sexual activity: Yes     Partners: Male     Birth control/protection: None     Comment: Ryan   Social History Narrative    Just moved , still moving. Renovating.    Ryan- nurse @ [employer],         No cats, some gardening (will wear gloves).     Social Determinants of Health     Financial Resource Strain: Low Risk  (2020)    Overall Financial Resource Strain (CARDIA)     Difficulty of Paying Living Expenses: Not hard at all   Food Insecurity: No Food Insecurity (2020)    Hunger Vital Sign     Worried About Running Out of Food in the Last Year: Never true     Ran Out of Food in the Last Year: Never true   Transportation Needs: No Transportation Needs (2020)    PRAPARE - Transportation     Lack of Transportation (Medical): No     Lack of Transportation (Non-Medical): No   Physical Activity: Sufficiently Active (2020)    Exercise Vital Sign     Days of Exercise per Week: 7 days     Minutes of Exercise per Session: 40 min   Stress: Stress Concern Present (2020)    [employer] - Occupational Stress Questionnaire     Feeling of Stress : To some extent   Social Connections: Unknown (2020)    Social Connection and Isolation Panel [NHANES]     Frequency of Communication with  "Friends and Family: Patient refused     Frequency of Social Gatherings with Friends and Family: Patient refused     Attends Taoism Services: Patient refused     Active Member of Clubs or Organizations: Patient refused     Attends Club or Organization Meetings: Patient refused     Marital Status: Patient refused         Chemistry        Component Value Date/Time     02/14/2023 1226    K 3.9 02/14/2023 1226     02/14/2023 1226    CO2 22 (L) 02/14/2023 1226    BUN 15 02/14/2023 1226    CREATININE 0.6 02/14/2023 1226    GLU 74 02/14/2023 1226        Component Value Date/Time    CALCIUM 9.4 02/14/2023 1226    ALKPHOS 57 05/03/2023 1600    AST 52 (H) 05/03/2023 1600    ALT 20 05/03/2023 1600    BILITOT 0.2 05/03/2023 1600    ESTGFRAFRICA >60 08/18/2020 1112    EGFRNONAA >60 08/18/2020 1112            Lab Results   Component Value Date    WBC 7.46 09/18/2023    HGB 12.5 09/18/2023    HCT 36.8 (L) 09/18/2023    MCV 92 09/18/2023     (L) 09/18/2023       No results for input(s): "PT", "INR", "PROTIME", "APTT" in the last 72 hours.        Pre-op Assessment    I have reviewed the Patient Summary Reports.     I have reviewed the Nursing Notes.    I have reviewed the Medications.     Review of Systems  Anesthesia Hx:  No problems with previous Anesthesia  Denies Family Hx of Anesthesia complications.   Denies Personal Hx of Anesthesia complications.   Hematology/Oncology:  Hematology Normal   Oncology Normal     EENT/Dental:EENT/Dental Normal   Cardiovascular:  Cardiovascular Normal     Pulmonary:  Pulmonary Normal    Renal/:  Renal/ Normal     Hepatic/GI:  Hepatic/GI Normal    Musculoskeletal:  Musculoskeletal Normal    Neurological:  Neurology Normal    Endocrine:  Endocrine Normal    Dermatological:  Skin Normal    Psych:  Psychiatric Normal           Physical Exam  General: Well nourished, Cooperative, Alert and Oriented    Airway:  Mallampati: II   Mouth Opening: Normal  TM Distance: " Normal  Tongue: Normal  Neck ROM: Normal ROM    Dental:  Intact        Anesthesia Plan  Type of Anesthesia, risks & benefits discussed:    Anesthesia Type: Gen ETT, Gen Natural Airway, Epidural  Intra-op Monitoring Plan: Standard ASA Monitors  Post Op Pain Control Plan: multimodal analgesia  Informed Consent: Informed consent signed with the Patient and all parties understand the risks and agree with anesthesia plan.  All questions answered.   ASA Score: 2  Day of Surgery Review of History & Physical: H&P Update referred to the surgeon/provider.    Ready For Surgery From Anesthesia Perspective.     .

## 2023-09-18 NOTE — PROGRESS NOTES
"LABOR NOTE    S:  Pt breathing through contractions. She states they are closer together and more intense.  Family at bedside and supportive.     O: /82   Pulse 66   Temp 98.3 °F (36.8 °C) (Oral)   Resp 18   Ht 5' 4" (1.626 m)   Wt 71.1 kg (156 lb 12 oz)   LMP 2022   SpO2 99%   Breastfeeding Yes   BMI 26.91 kg/m²     GENERAL: Calm and appropriate affect  NEURO: Alert, oriented, normal speech  ABDOMEN: Nontender, Fundus palpates soft between UC's.  FHT: Baseline 155, moderate variability, positive accels, no decels. Cat 1, reassuring.  CTX: q 2-5 minutes  SVE: 4.5/70/-1, bloody show noted    ASSESSMENT:   38 y.o.  IUP at 39w6d, FHT reassuring/ Cat 1    Patient Active Problem List   Diagnosis    Postpartum anxiety    Pelvic floor dysfunction    Other lack of coordination    Muscle weakness    Elevated liver enzymes    History of low transverse  section - considering TOLAC    Pregnancy    Antepartum multigravida of advanced maternal age    Diastasis of rectus abdominis    Lack of coordination    Placenta previa in second trimester - RESOLVED    COVID-19 affecting pregnancy in second trimester    Full-term premature rupture of membranes    Elevated blood pressure affecting pregnancy in third trimester, antepartum         PLAN:  Continue close Maternal/Fetal monitoring  Pitocin recommended to patient given PROM. Pt desires hydrotherapy. Discussed recommendation to wait until active labor; she appears to be approaching active labor. She is deciding between hydrotherapy and an epidural. She declines pitocin currently but is considering if she uses hydrotherapy or gets an epidural.  Recheck 4 hours or PRN  Epidural per anesthesia at pt's request        " Hidradenocarcinoma Histology Text: On histologic exam, there are nodular, epithelioid, basaloid tumor cells with irregular infiltration of the surrounding dermis and foci of duct formation.

## 2023-09-18 NOTE — ANESTHESIA PROCEDURE NOTES
Epidural    Patient location during procedure: OB   Reason for block: primary anesthetic   Reason for block: labor analgesia requested by patient and obstetrician  Diagnosis: IUP   Start time: 9/18/2023 5:00 PM  Timeout: 9/18/2023 4:58 PM  End time: 9/18/2023 5:12 PM  Surgery related to: Vaginal Delivery    Staffing  Performing Provider: Dat Loredo DO  Authorizing Provider: Elzbieta Russo MD    Staffing  Performed by: Dat Loredo DO  Authorized by: Elzbieta Russo MD        Preanesthetic Checklist  Completed: patient identified, IV checked, site marked, risks and benefits discussed, surgical consent, monitors and equipment checked, pre-op evaluation, timeout performed, anesthesia consent given, hand hygiene performed and patient being monitored  Preparation  Patient position: sitting  Prep: ChloraPrep  Patient monitoring: Pulse Ox  Reason for block: primary anesthetic   Epidural  Skin Anesthetic: lidocaine 1%  Skin Wheal: 3 mL  Administration type: continuous  Approach: midline  Interspace: L3-4    Injection technique: TRAN saline  Needle and Epidural Catheter  Needle type: Tuohy   Needle gauge: 17  Needle length: 3.5 inches  Needle insertion depth: 5.5 cm  Catheter type: [employer]  Catheter size: 19 G  Catheter at skin depth: 10 cm    Test dose: 3 mL of lidocaine 1.5% with Epi 1-to-200,000  Additional Documentation: incremental injection, negative aspiration for heme and CSF, no paresthesia on injection, no signs/symptoms of IV or SA injection, no significant pain on injection and no significant complaints from patient  Needle localization: anatomical landmarks  Medications:  Volume per aspiration: 5 mL  Time between aspirations: 5 minutes   Assessment  Ease of block: easy  Patient's tolerance of the procedure: comfortable throughout block and no complaints

## 2023-09-18 NOTE — HPI
Loren Dickerson is a 38 y.o. female  at 39w6d with Estimated Date of Delivery: 23 who presents to [facility] accompanied by Ryan.      Reports leakage of clear fluid since 0430 this morning. mild contractions, active fetal movement, No vaginal bleeding , Yes loss of fluid.      Last ate pancakes at 1000, last drank water at bedside.      This pregnancy has been complicated by LTCS (arrest of descent, chorioamnionitis), postpartum anxiety, AMA, and resolved placenta previa.        Presentation:   Cephalic by BSUS  Estimated Fetal Weight: 7 lb 7 oz     Birth Center Risk Assessment: 1-management on labor and Delivery (TOLAC, PROM)     CNM management in ABC  CNM management on L&D  Consultation with OB to develop  plan of care  Collaborative CNM/OB management with delivery on L&D              4-   Permanent referral of care to MD

## 2023-09-18 NOTE — ED PROVIDER NOTES
Encounter Date: 2023       History     Chief Complaint   Patient presents with    Contractions    Rupture of Membranes     Loren Dickerson is a 38 y.o. female  at 39w6d with Estimated Date of Delivery: 23 who presents to [facility] accompanied by Ryan.     Reports leakage of clear fluid since 0430 this morning. mild contractions, active fetal movement, No vaginal bleeding , Yes loss of fluid.     Last ate pancakes at 1000, last drank water at bedside.     This pregnancy has been complicated by LTCS (arrest of descent, chorioamnionitis), postpartum anxiety, AMA, and resolved placenta previa.       Presentation: Cephalic by BSUS  Estimated Fetal Weight: 7 lb 7 oz    Birth Center Risk Assessment: 1-management on labor and Delivery (TOLAC, PROM)    CNM management in ABC  CNM management on L&D  Consultation with OB to develop  plan of care  Collaborative CNM/OB management with delivery on L&D   4-   Permanent referral of care to MD          Review of patient's allergies indicates:  No Known Allergies  Past Medical History:   Diagnosis Date    Migraines     Only during the beginning of pregnancy    PROM (premature rupture of membranes) 2021     Past Surgical History:   Procedure Laterality Date     SECTION N/A 2021    Procedure:  SECTION;  Surgeon: Jacquelyn Jesus MD;  Location: [facility] L&D;  Service: OB/GYN;  Laterality: N/A;     SECTION      WISDOM TOOTH EXTRACTION      No complications     Family History   Problem Relation Age of Onset    No Known Problems Father     Fibromyalgia Mother     Hypertension Mother     No Known Problems Sister     Emphysema Maternal Grandmother     No Known Problems Maternal Grandfather     No Known Problems Paternal Grandmother     Emphysema Paternal Grandfather     No Known Problems Sister         both children were   32wks, 35wks    Breast cancer Neg Hx     Colon cancer Neg Hx     Ovarian cancer Neg Hx      Social  History     Tobacco Use    Smoking status: Never    Smokeless tobacco: Never   Substance Use Topics    Alcohol use: Not Currently     Comment: intermittent few times month, 1-5 servings at a time    Drug use: Yes     Types: Marijuana     Comment: past, none during pregnancy     Review of Systems   Constitutional:  Negative for chills and fever.   Gastrointestinal:  Negative for abdominal pain, nausea and vomiting.   Genitourinary:  Positive for vaginal discharge. Negative for vaginal bleeding.        Leakage of clear fluid per vagina       Physical Exam     Initial Vitals   BP Pulse Resp Temp SpO2   09/18/23 1038 09/18/23 1037 -- 09/18/23 1054 09/18/23 1038   (!) 140/95 75  98.3 °F (36.8 °C) 99 %      MAP       --                Physical Exam    Vitals reviewed.  Constitutional: She appears well-developed and well-nourished.   HENT:   Head: Normocephalic and atraumatic.   Eyes: EOM are normal. Pupils are equal, round, and reactive to light.   Neck:   Normal range of motion.  Cardiovascular:  Normal rate.           Abdominal: Abdomen is soft.   Mild contractions   Genitourinary:    Vagina normal.      Genitourinary Comments: Ferning +, nitrazine +     Musculoskeletal:         General: Normal range of motion.      Cervical back: Normal range of motion.     Neurological: She is alert and oriented to person, place, and time.   Skin: Skin is warm and dry.   Psychiatric: She has a normal mood and affect. Her behavior is normal. Judgment and thought content normal.         ED Course   Fetal non-stress test    Date/Time: 9/18/2023 10:40 AM    Performed by: Viktoria Dean MD  Authorized by: Calderon Alfonso MD    Nonstress Test:     Variability:  6-25 BPM    Decelerations:  None    Accelerations:  15 bpm    Contractions:  Irregular  Biophysical Profile:     Nonstress Test Interpretation: reactive      Overall Impression:  Reassuring    Labs Reviewed          Imaging Results    None          Medications   lactated  ringers bolus 1,000 mL (has no administration in time range)   lactated ringers bolus 500 mL (has no administration in time range)   lactated ringers infusion (has no administration in time range)   0.9%  NaCl infusion (has no administration in time range)   oxytocin 30 units in 500 mL lactated ringers infusion (non-titrating) (has no administration in time range)   oxytocin 30 units in 500 mL lactated ringers infusion (non-titrating) (has no administration in time range)   ondansetron disintegrating tablet 8 mg (has no administration in time range)   prochlorperazine injection Soln 5 mg (has no administration in time range)   calcium carbonate 200 mg calcium (500 mg) chewable tablet 500 mg (has no administration in time range)   simethicone chewable tablet 80 mg (has no administration in time range)   LIDOcaine HCL 10 mg/ml (1%) injection 10 mL (has no administration in time range)   oxytocin 30 units in 500 mL lactated ringers infusion (non-titrating) (has no administration in time range)   oxytocin 30 units in 500 mL lactated ringers infusion (non-titrating) (has no administration in time range)   oxytocin injection 10 Units (has no administration in time range)   miSOPROStoL tablet 800 mcg (has no administration in time range)   miSOPROStoL tablet 800 mcg (has no administration in time range)   methylergonovine injection 200 mcg (has no administration in time range)   carboprost injection 250 mcg (has no administration in time range)   tranexamic acid in NaCl,iso-os IVPB 1,000 mg (has no administration in time range)   diphenoxylate-atropine 2.5-0.025 mg per tablet 2 tablet (has no administration in time range)     Medical Decision Making  Ferning +, nitrazine + on SSE  3/70/-2 @ 1100  Plan of care discussed with Dr. Dean.  Expectant management of PROM, recheck in 4 hours  Insert PIV  Mild range BP in [facility], pre-eclampsia labs ordered  , blood consents signed in clinic    Amount and/or Complexity of Data  Reviewed  Labs: ordered.    Risk  OTC drugs.  Prescription drug management.              Attending Attestation:   Physician Attestation Statement for Resident:  As the supervising MD   Physician Attestation Statement: I have personally seen and examined this patient.   I agree with the above history.  -:   As the supervising MD I agree with the above PE.     As the supervising MD I agree with the above treatment, course, plan, and disposition.   I was personally present during the critical portions of the procedure(s) performed by the resident and was immediately available in the ED to provide services and assistance as needed during the entire procedure.                                  Clinical Impression:   Final diagnoses:  [O42.90] PROM (premature rupture of membranes)               Margarita Ryan CNM  09/18/23 [id number]       Viktoria Dean MD  09/20/23 [id number]

## 2023-09-19 PROBLEM — O13.3 GESTATIONAL HYPERTENSION, THIRD TRIMESTER: Status: RESOLVED | Noted: 2023-09-18 | Resolved: 2023-09-19

## 2023-09-19 PROBLEM — D64.9 ANEMIA: Status: ACTIVE | Noted: 2023-09-19

## 2023-09-19 PROBLEM — O99.113 GESTATIONAL THROMBOCYTOPENIA WITHOUT HEMORRHAGE IN THIRD TRIMESTER: Status: ACTIVE | Noted: 2023-09-19

## 2023-09-19 PROBLEM — O99.113 GESTATIONAL THROMBOCYTOPENIA WITHOUT HEMORRHAGE IN THIRD TRIMESTER: Status: RESOLVED | Noted: 2023-09-19 | Resolved: 2023-09-19

## 2023-09-19 PROBLEM — O34.219 VBAC, DELIVERED: Status: ACTIVE | Noted: 2023-09-19

## 2023-09-19 PROBLEM — O14.03 PRE-ECLAMPSIA, MILD, THIRD TRIMESTER: Status: ACTIVE | Noted: 2023-09-18

## 2023-09-19 PROBLEM — D69.6 GESTATIONAL THROMBOCYTOPENIA WITHOUT HEMORRHAGE IN THIRD TRIMESTER: Status: ACTIVE | Noted: 2023-09-19

## 2023-09-19 PROBLEM — D69.6 GESTATIONAL THROMBOCYTOPENIA WITHOUT HEMORRHAGE IN THIRD TRIMESTER: Status: RESOLVED | Noted: 2023-09-19 | Resolved: 2023-09-19

## 2023-09-19 LAB
BASOPHILS # BLD AUTO: 0.01 K/UL (ref 0–0.2)
BASOPHILS NFR BLD: 0.1 % (ref 0–1.9)
DIFFERENTIAL METHOD: ABNORMAL
EOSINOPHIL # BLD AUTO: 0 K/UL (ref 0–0.5)
EOSINOPHIL NFR BLD: 0.1 % (ref 0–8)
ERYTHROCYTE [DISTWIDTH] IN BLOOD BY AUTOMATED COUNT: 13 % (ref 11.5–14.5)
HCT VFR BLD AUTO: 28.3 % (ref 37–48.5)
HGB BLD-MCNC: 9.6 G/DL (ref 12–16)
IMM GRANULOCYTES # BLD AUTO: 0.04 K/UL (ref 0–0.04)
IMM GRANULOCYTES NFR BLD AUTO: 0.3 % (ref 0–0.5)
LYMPHOCYTES # BLD AUTO: 1.4 K/UL (ref 1–4.8)
LYMPHOCYTES NFR BLD: 11.5 % (ref 18–48)
MCH RBC QN AUTO: 31.2 PG (ref 27–31)
MCHC RBC AUTO-ENTMCNC: 33.9 G/DL (ref 32–36)
MCV RBC AUTO: 92 FL (ref 82–98)
MONOCYTES # BLD AUTO: 0.8 K/UL (ref 0.3–1)
MONOCYTES NFR BLD: 6.2 % (ref 4–15)
NEUTROPHILS # BLD AUTO: 9.8 K/UL (ref 1.8–7.7)
NEUTROPHILS NFR BLD: 81.8 % (ref 38–73)
NRBC BLD-RTO: 0 /100 WBC
PLATELET # BLD AUTO: 94 K/UL (ref 150–450)
PMV BLD AUTO: 12.9 FL (ref 9.2–12.9)
RBC # BLD AUTO: 3.08 M/UL (ref 4–5.4)
WBC # BLD AUTO: 12.04 K/UL (ref 3.9–12.7)

## 2023-09-19 PROCEDURE — 63600175 PHARM REV CODE 636 W HCPCS

## 2023-09-19 PROCEDURE — 72200005 HC VAGINAL DELIVERY LEVEL II

## 2023-09-19 PROCEDURE — 25000003 PHARM REV CODE 250

## 2023-09-19 PROCEDURE — 25000003 PHARM REV CODE 250: Performed by: OBSTETRICS & GYNECOLOGY

## 2023-09-19 PROCEDURE — 72100002 HC LABOR CARE, 1ST 8 HOURS

## 2023-09-19 PROCEDURE — 59610 PR ROUT OB CARE,VAG DELIV,PREV C-SEC: ICD-10-PCS | Mod: GB,,,

## 2023-09-19 PROCEDURE — 11000001 HC ACUTE MED/SURG PRIVATE ROOM

## 2023-09-19 PROCEDURE — 85025 COMPLETE CBC W/AUTO DIFF WBC: CPT

## 2023-09-19 PROCEDURE — 36415 COLL VENOUS BLD VENIPUNCTURE: CPT

## 2023-09-19 PROCEDURE — 72100003 HC LABOR CARE, EA. ADDL. 8 HRS

## 2023-09-19 PROCEDURE — 51701 INSERT BLADDER CATHETER: CPT

## 2023-09-19 RX ORDER — DIPHENOXYLATE HYDROCHLORIDE AND ATROPINE SULFATE 2.5; .025 MG/1; MG/1
2 TABLET ORAL EVERY 6 HOURS PRN
Status: DISCONTINUED | OUTPATIENT
Start: 2023-09-19 | End: 2023-09-20 | Stop reason: HOSPADM

## 2023-09-19 RX ORDER — DOCUSATE SODIUM 100 MG/1
100 CAPSULE, LIQUID FILLED ORAL DAILY
Status: DISCONTINUED | OUTPATIENT
Start: 2023-09-19 | End: 2023-09-19

## 2023-09-19 RX ORDER — DIPHENHYDRAMINE HYDROCHLORIDE 50 MG/ML
25 INJECTION INTRAMUSCULAR; INTRAVENOUS EVERY 4 HOURS PRN
Status: DISCONTINUED | OUTPATIENT
Start: 2023-09-19 | End: 2023-09-20 | Stop reason: HOSPADM

## 2023-09-19 RX ORDER — OXYTOCIN/RINGER'S LACTATE 30/500 ML
95 PLASTIC BAG, INJECTION (ML) INTRAVENOUS ONCE
Status: DISCONTINUED | OUTPATIENT
Start: 2023-09-19 | End: 2023-09-20 | Stop reason: HOSPADM

## 2023-09-19 RX ORDER — OXYTOCIN/RINGER'S LACTATE 30/500 ML
95 PLASTIC BAG, INJECTION (ML) INTRAVENOUS ONCE AS NEEDED
Status: DISCONTINUED | OUTPATIENT
Start: 2023-09-19 | End: 2023-09-20 | Stop reason: HOSPADM

## 2023-09-19 RX ORDER — SODIUM CHLORIDE 0.9 % (FLUSH) 0.9 %
10 SYRINGE (ML) INJECTION
Status: DISCONTINUED | OUTPATIENT
Start: 2023-09-19 | End: 2023-09-20 | Stop reason: HOSPADM

## 2023-09-19 RX ORDER — PROCHLORPERAZINE EDISYLATE 5 MG/ML
5 INJECTION INTRAMUSCULAR; INTRAVENOUS EVERY 6 HOURS PRN
Status: DISCONTINUED | OUTPATIENT
Start: 2023-09-19 | End: 2023-09-20 | Stop reason: HOSPADM

## 2023-09-19 RX ORDER — MAGNESIUM SULFATE HEPTAHYDRATE 40 MG/ML
2 INJECTION, SOLUTION INTRAVENOUS CONTINUOUS
Status: DISCONTINUED | OUTPATIENT
Start: 2023-09-19 | End: 2023-09-20 | Stop reason: HOSPADM

## 2023-09-19 RX ORDER — DOCUSATE SODIUM 100 MG/1
200 CAPSULE, LIQUID FILLED ORAL 2 TIMES DAILY PRN
Status: DISCONTINUED | OUTPATIENT
Start: 2023-09-19 | End: 2023-09-19

## 2023-09-19 RX ORDER — OXYTOCIN/RINGER'S LACTATE 30/500 ML
334 PLASTIC BAG, INJECTION (ML) INTRAVENOUS ONCE AS NEEDED
Status: DISCONTINUED | OUTPATIENT
Start: 2023-09-19 | End: 2023-09-20 | Stop reason: HOSPADM

## 2023-09-19 RX ORDER — LANOLIN ALCOHOL/MO/W.PET/CERES
1 CREAM (GRAM) TOPICAL DAILY
Status: DISCONTINUED | OUTPATIENT
Start: 2023-09-19 | End: 2023-09-20 | Stop reason: HOSPADM

## 2023-09-19 RX ORDER — CARBOPROST TROMETHAMINE 250 UG/ML
250 INJECTION, SOLUTION INTRAMUSCULAR
Status: DISCONTINUED | OUTPATIENT
Start: 2023-09-19 | End: 2023-09-20 | Stop reason: HOSPADM

## 2023-09-19 RX ORDER — MISOPROSTOL 200 UG/1
800 TABLET ORAL ONCE AS NEEDED
Status: DISCONTINUED | OUTPATIENT
Start: 2023-09-19 | End: 2023-09-20 | Stop reason: HOSPADM

## 2023-09-19 RX ORDER — TRANEXAMIC ACID 10 MG/ML
1000 INJECTION, SOLUTION INTRAVENOUS ONCE AS NEEDED
Status: DISCONTINUED | OUTPATIENT
Start: 2023-09-19 | End: 2023-09-20 | Stop reason: HOSPADM

## 2023-09-19 RX ORDER — METHYLERGONOVINE MALEATE 0.2 MG/ML
200 INJECTION INTRAVENOUS
Status: DISCONTINUED | OUTPATIENT
Start: 2023-09-19 | End: 2023-09-19

## 2023-09-19 RX ORDER — ACETAMINOPHEN 325 MG/1
650 TABLET ORAL EVERY 6 HOURS PRN
Status: DISCONTINUED | OUTPATIENT
Start: 2023-09-19 | End: 2023-09-20 | Stop reason: HOSPADM

## 2023-09-19 RX ORDER — CALCIUM GLUCONATE 98 MG/ML
1 INJECTION, SOLUTION INTRAVENOUS
Status: DISCONTINUED | OUTPATIENT
Start: 2023-09-19 | End: 2023-09-20 | Stop reason: HOSPADM

## 2023-09-19 RX ORDER — DOCUSATE SODIUM 100 MG/1
200 CAPSULE, LIQUID FILLED ORAL 2 TIMES DAILY
Status: DISCONTINUED | OUTPATIENT
Start: 2023-09-19 | End: 2023-09-20 | Stop reason: HOSPADM

## 2023-09-19 RX ORDER — ONDANSETRON 8 MG/1
8 TABLET, ORALLY DISINTEGRATING ORAL EVERY 8 HOURS PRN
Status: DISCONTINUED | OUTPATIENT
Start: 2023-09-19 | End: 2023-09-20 | Stop reason: HOSPADM

## 2023-09-19 RX ORDER — HYDROCODONE BITARTRATE AND ACETAMINOPHEN 5; 325 MG/1; MG/1
1 TABLET ORAL EVERY 4 HOURS PRN
Status: DISCONTINUED | OUTPATIENT
Start: 2023-09-19 | End: 2023-09-20 | Stop reason: HOSPADM

## 2023-09-19 RX ORDER — IBUPROFEN 600 MG/1
600 TABLET ORAL EVERY 6 HOURS
Status: DISCONTINUED | OUTPATIENT
Start: 2023-09-19 | End: 2023-09-20 | Stop reason: HOSPADM

## 2023-09-19 RX ORDER — ESCITALOPRAM OXALATE 5 MG/1
5 TABLET ORAL DAILY
Status: DISCONTINUED | OUTPATIENT
Start: 2023-09-19 | End: 2023-09-20 | Stop reason: HOSPADM

## 2023-09-19 RX ORDER — SODIUM CHLORIDE, SODIUM LACTATE, POTASSIUM CHLORIDE, CALCIUM CHLORIDE 600; 310; 30; 20 MG/100ML; MG/100ML; MG/100ML; MG/100ML
INJECTION, SOLUTION INTRAVENOUS CONTINUOUS
Status: DISCONTINUED | OUTPATIENT
Start: 2023-09-19 | End: 2023-09-20 | Stop reason: HOSPADM

## 2023-09-19 RX ORDER — ESCITALOPRAM OXALATE 5 MG/1
5 TABLET ORAL NIGHTLY
Status: DISCONTINUED | OUTPATIENT
Start: 2023-09-19 | End: 2023-09-19

## 2023-09-19 RX ORDER — SIMETHICONE 80 MG
1 TABLET,CHEWABLE ORAL EVERY 6 HOURS PRN
Status: DISCONTINUED | OUTPATIENT
Start: 2023-09-19 | End: 2023-09-20 | Stop reason: HOSPADM

## 2023-09-19 RX ORDER — DIPHENHYDRAMINE HCL 25 MG
25 CAPSULE ORAL EVERY 4 HOURS PRN
Status: DISCONTINUED | OUTPATIENT
Start: 2023-09-19 | End: 2023-09-20 | Stop reason: HOSPADM

## 2023-09-19 RX ORDER — MAGNESIUM SULFATE HEPTAHYDRATE 40 MG/ML
4 INJECTION, SOLUTION INTRAVENOUS ONCE
Status: COMPLETED | OUTPATIENT
Start: 2023-09-19 | End: 2023-09-19

## 2023-09-19 RX ORDER — PRENATAL WITH FERROUS FUM AND FOLIC ACID 3080; 920; 120; 400; 22; 1.84; 3; 20; 10; 1; 12; 200; 27; 25; 2 [IU]/1; [IU]/1; MG/1; [IU]/1; MG/1; MG/1; MG/1; MG/1; MG/1; MG/1; UG/1; MG/1; MG/1; MG/1; MG/1
1 TABLET ORAL DAILY
Status: DISCONTINUED | OUTPATIENT
Start: 2023-09-19 | End: 2023-09-20 | Stop reason: HOSPADM

## 2023-09-19 RX ORDER — OXYTOCIN 10 [USP'U]/ML
10 INJECTION, SOLUTION INTRAMUSCULAR; INTRAVENOUS ONCE AS NEEDED
Status: DISCONTINUED | OUTPATIENT
Start: 2023-09-19 | End: 2023-09-20 | Stop reason: HOSPADM

## 2023-09-19 RX ORDER — HYDROCORTISONE 25 MG/G
CREAM TOPICAL 3 TIMES DAILY PRN
Status: DISCONTINUED | OUTPATIENT
Start: 2023-09-19 | End: 2023-09-20 | Stop reason: HOSPADM

## 2023-09-19 RX ADMIN — PRENATAL VIT W/ FE FUMARATE-FA TAB 27-0.8 MG 1 TABLET: 27-0.8 TAB at 09:09

## 2023-09-19 RX ADMIN — HYDROCORTISONE: 25 CREAM TOPICAL at 06:09

## 2023-09-19 RX ADMIN — DOCUSATE SODIUM 200 MG: 100 CAPSULE, LIQUID FILLED ORAL at 08:09

## 2023-09-19 RX ADMIN — IBUPROFEN 600 MG: 600 TABLET ORAL at 11:09

## 2023-09-19 RX ADMIN — ESCITALOPRAM OXALATE 5 MG: 5 TABLET, FILM COATED ORAL at 11:09

## 2023-09-19 RX ADMIN — ONDANSETRON 8 MG: 8 TABLET, ORALLY DISINTEGRATING ORAL at 10:09

## 2023-09-19 RX ADMIN — MAGNESIUM SULFATE HEPTAHYDRATE 2 G/HR: 40 INJECTION, SOLUTION INTRAVENOUS at 10:09

## 2023-09-19 RX ADMIN — SODIUM CHLORIDE, POTASSIUM CHLORIDE, SODIUM LACTATE AND CALCIUM CHLORIDE: 600; 310; 30; 20 INJECTION, SOLUTION INTRAVENOUS at 10:09

## 2023-09-19 RX ADMIN — DOCUSATE SODIUM 200 MG: 100 CAPSULE, LIQUID FILLED ORAL at 09:09

## 2023-09-19 RX ADMIN — MAGNESIUM SULFATE HEPTAHYDRATE 4 G: 40 INJECTION, SOLUTION INTRAVENOUS at 10:09

## 2023-09-19 RX ADMIN — FERROUS SULFATE TAB 325 MG (65 MG ELEMENTAL FE) 1 EACH: 325 (65 FE) TAB at 09:09

## 2023-09-19 RX ADMIN — IBUPROFEN 600 MG: 600 TABLET ORAL at 06:09

## 2023-09-19 RX ADMIN — IBUPROFEN 600 MG: 600 TABLET ORAL at 05:09

## 2023-09-19 NOTE — LACTATION NOTE
09/19/23 1620   Maternal Assessment   Breast Shape Left:;round   Breast Density Left:;soft   Areola Left:;elastic   Nipples Left:;everted   Maternal Infant Feeding   Maternal Emotional State independent   Infant Positioning side-lying   Signs of Milk Transfer audible swallow;infant jaw motion present   Latch Assistance no     Pt has baby latch to breast in side lying position. Good tugs and pulls observed. Basic breastfeeding education provided. Basic education provided. Questions answered.

## 2023-09-19 NOTE — ANESTHESIA POSTPROCEDURE EVALUATION
Anesthesia Post Evaluation    Patient: Loren Dickerson    Procedure(s) Performed: * No procedures listed *    Final Anesthesia Type: CSE      Patient location during evaluation: labor & delivery  Patient participation: Yes- Able to Participate  Level of consciousness: awake and alert  Post-procedure vital signs: reviewed and stable  Pain management: adequate  Airway patency: patent  MECCA mitigation strategies: Multimodal analgesia and Use of major conduction anesthesia (spinal/epidural) or peripheral nerve block  PONV status at discharge: No PONV  Anesthetic complications: no      Cardiovascular status: blood pressure returned to baseline  Respiratory status: unassisted and spontaneous ventilation  Follow-up not needed.          Vitals Value Taken Time   /81 09/19/23 1402   Temp 36.6 °C (97.9 °F) 09/19/23 1200   Pulse 87 09/19/23 1501   Resp 18 09/19/23 1200   SpO2 97 % 09/19/23 1501   Vitals shown include unvalidated device data.      No case tracking events are documented in the log.      Pain/Sabrina Score: Pain Rating Prior to Med Admin: 2 (9/19/2023 11:45 AM)  Pain Rating Post Med Admin: 0 (9/19/2023 12:30 PM)

## 2023-09-19 NOTE — PLAN OF CARE
VSS. Pain controlled with scheduled oral pain medication. Patient breast feeding baby. Fundus firm and midline with light lochia rubra. Patient due to void between 0715 and 0915. In and out cath done at 0315 in L&D. Repeat CBC this morning. Will continue to monitor the patient and intervene as necessary.          Problem:  Fall Injury Risk  Goal: Absence of Fall, Infant Drop and Related Injury  Outcome: Ongoing, Progressing     Problem: Adult Inpatient Plan of Care  Goal: Plan of Care Review  Outcome: Ongoing, Progressing  Goal: Patient-Specific Goal (Individualized)  Outcome: Ongoing, Progressing  Goal: Absence of Hospital-Acquired Illness or Injury  Outcome: Ongoing, Progressing  Goal: Optimal Comfort and Wellbeing  Outcome: Ongoing, Progressing  Goal: Readiness for Transition of Care  Outcome: Ongoing, Progressing     Problem: Infection  Goal: Absence of Infection Signs and Symptoms  Outcome: Ongoing, Progressing     Problem: Breastfeeding  Goal: Effective Breastfeeding  Outcome: Ongoing, Progressing     Problem: Adjustment to Role Transition (Postpartum Vaginal Delivery)  Goal: Successful Maternal Role Transition  Outcome: Ongoing, Progressing     Problem: Bleeding (Postpartum Vaginal Delivery)  Goal: Hemostasis  Outcome: Ongoing, Progressing     Problem: Infection (Postpartum Vaginal Delivery)  Goal: Absence of Infection Signs/Symptoms  Outcome: Ongoing, Progressing     Problem: Pain (Postpartum Vaginal Delivery)  Goal: Acceptable Pain Control  Outcome: Ongoing, Progressing     Problem: Urinary Retention (Postpartum Vaginal Delivery)  Goal: Effective Urinary Elimination  Outcome: Ongoing, Progressing

## 2023-09-19 NOTE — PROGRESS NOTES
[facility] ([place])  Obstetrics  Postpartum Progress Note    Patient Name: Loren Dickerson  MRN: [id number]  Admission Date: 2023  Hospital Length of Stay: 1 days  Attending Physician: Calderon Alfonso MD  Primary Care Provider: Esther Brunson MD    Subjective:     Principal Problem:, delivered    Hospital Course:  3/70/-2 @ 1100, ROM confirmed by + ferning and + nitrazine on SSE  Admit to L&D  Discussed patient admit with Dr. Dean and Dr. Alfonso  Lengthy discussion of PROM at term given previous . Loren opts for expectant management for 4 hours and would like to avoid Pitocin. Discussed current recommendation for augmentation given PROM and history of prolonged ROM/chorioamnionitis. Discussed we do not recommend prolonging labor given previous  section and likely need for eventual augmentation. She agrees to plan to expectantly manage PROM and recheck cervical dilation in 4 hours. She understands if she desires augmentation prior to this, low-dose pitocin will be ordered.  Epidural per anesthesia at patient request  CEFM, peripheral IV  Pre-E labs drawn due to mild range BP in TERESO    23 0830 Pt doing well, breastfeeding with baby at bedside. Reviewed PCR and low platelets with severe range BPT x 2 meets criteria for pre-e; pt is asymptomatic, denies HA, chest pain, vision changes. MD resident to discuss mag protocol with patient and follow      Interval History: Doing well, ambulating, voiding, and tolerating regular diet  Lochia: steadily decreasing  Pain: well controlled  requiring PO NSAID medication  Breasts/nipples: intact feeding well without difficulty; has not yet lactation  Depression/anxiety: denies   Support at home: good  Contraception: considering options; understands that progesterone only options are appropriate with breastfeeding  [place]: girl is doing well, will f/u with pediatrician     Gen: A&O x 4, NAD  CV: normal HR  Lungs: normal  resp effort  Breasts: bilaterally soft, non-tender, nipples intact   Abdomen: soft, non-tender, uterus firm at U - 1 fb  Perineum: approximated, no edema   Lochia: minimal rubra  Ext: bilaterally no pedal edema, without signs of DVT      Objective:     Vital Signs (Most Recent):  Temp: 97.7 °F (36.5 °C) (09/19/23 0857)  Pulse: 63 (09/19/23 0857)  Resp: 18 (09/19/23 0857)  BP: 119/72 (09/19/23 0857)  SpO2: 98 % (09/19/23 0857) Vital Signs (24h Range):  Temp:  [97.4 °F (36.3 °C)-98.6 °F (37 °C)] 97.7 °F (36.5 °C)  Pulse:  [54-85] 63  Resp:  [18] 18  SpO2:  [96 %-100 %] 98 %  BP: (117-168)/(61-95) 119/72     Weight: 71.7 kg (158 lb 1.1 oz)  Body mass index is 27.13 kg/m².      Intake/Output Summary (Last 24 hours) at 9/19/2023 0942  Last data filed at 9/19/2023 0630  Gross per 24 hour   Intake --   Output 2258 ml   Net -2258 ml         Significant Labs:  Lab Results   Component Value Date    GROUPTRH O POS 09/18/2023    HEPBSAG Non-reactive 02/14/2023    STREPBCULT No Group B Streptococcus isolated 08/23/2023     Recent Labs   Lab 09/19/23  0421   HGB 9.6*   HCT 28.3*       I have personallly reviewed all pertinent lab results from the last 24 hours.    Physical Exam:   Constitutional: She is oriented to person, place, and time. She appears well-developed and well-nourished. No distress.    HENT:   Head: Normocephalic and atraumatic.    Eyes: Pupils are equal, round, and reactive to light.      Pulmonary/Chest: Effort normal and breath sounds normal.        Abdominal: Bowel sounds are normal. Distension: appropriate for PPD #1.             Musculoskeletal: Normal range of motion and moves all extremeties.       Neurological: She is alert and oriented to person, place, and time.    Skin: Skin is warm and dry.    Psychiatric: She has a normal mood and affect. Her behavior is normal.       Review of Systems   Constitutional: Negative.    HENT: Negative.     Eyes: Negative.    Respiratory: Negative.     Cardiovascular:  Negative.    Gastrointestinal:  Abdominal pain: cramping.   Endocrine: Negative.    Genitourinary:  Vaginal bleeding: moderate lochia. Vaginal pain: mild laceration repair site tenderness.   Musculoskeletal: Negative.    Integumentary:  Nipple discharge: lactating. Negative.   Neurological: Negative.    Hematological: Negative.    Psychiatric/Behavioral: Negative.     Breast: Nipple discharge: lactating.      Assessment/Plan:     38 y.o. female  for:    Anemia  Pre 12.5/36>>9.6/28  Iron PP     Obstetrical laceration, second degree  Routine priscilla-care    Preeclampsia in postpartum period  Physician team to manage MgSo4    Full-term premature rupture of membranes  PROM @ 0430, clear fluid  CEFM  +nitrazine, + ferning on SSE  3/70/-2 @1100  Expectant management until recheck SVE at 1500    History of low transverse  section - considering TOLAC  CEFM  Clear liquid diet  Desires epidural during labor (unsure of when)  Admission  calculator 55.7%  Dr. Alfonso updated on patient status    Elevated liver enzymes  CMP on admit      Physician team notified    Disposition: As patient meets milestones, will plan to discharge home tomorrow pending resolution of concern for pre-e, will defer to physician management.    Linda Duval CNM  Obstetrics  [facility] ([place])

## 2023-09-19 NOTE — L&D DELIVERY NOTE
[facility] - Labor & Delivery  Vaginal Delivery   Operative Note    SUMMARY     Normal spontaneous vaginal delivery of live infant, was placed on mothers abdomen for skin to skin and bulb suctioning performed.  Infant delivered position OA over perineum.  After delayed cord clamping; cord was double clamped and then cut by FOB.   Nuchal cord: No.    Spontaneous delivery of placenta and IV pitocin given noting good uterine tone. (Significant amount of bleeding was from second degree tear.)  2nd degree laceration noted and repaired in normal fashion with 2-0 chromic and 3-0 vicryl achieving good hemostasis and approximation .   Patient tolerated delivery well. Sponge needle and lap counted correctly x2.    Indications: Full-term premature rupture of membranes  Pregnancy complicated by:   Patient Active Problem List   Diagnosis    Postpartum anxiety    Pelvic floor dysfunction    Other lack of coordination    Muscle weakness    Elevated liver enzymes    History of low transverse  section - considering TOLAC    Pregnancy    Antepartum multigravida of advanced maternal age    Diastasis of rectus abdominis    Lack of coordination    Placenta previa in second trimester - RESOLVED    COVID-19 affecting pregnancy in second trimester    Full-term premature rupture of membranes    Elevated blood pressure affecting pregnancy in third trimester, antepartum    Gestational hypertension, third trimester    , delivered    Obstetrical laceration, second degree     Admitting GA: 40w0d    Delivery Information for Ankit Dickerson    Birth information:  YOB: 2023   Time of birth: 11:27 PM   Sex: female   Head Delivery Date/Time: 2023 11:27 PM   Delivery type: Vaginal, Spontaneous   Gestational Age: 39w6d        Delivery Providers    Delivering clinician: Maryellen Guerra CNM   Provider Role    Brittany Foley RN Cunningham, Claire, RN Vicknair, Kristine, RN               Measurements   "  Weight: 3890 g  Weight (lbs): 8 lb 9.2 oz  Length: 53.3 cm  Length (in): 21"  Head circumference: 34.3 cm  Chest circumference: 34.3 cm         Apgars    Living status: Living  Apgar Component Scores:  1 min.:  5 min.:  10 min.:  15 min.:  20 min.:    Skin color:  0  1       Heart rate:  2  2       Reflex irritability:  2  2       Muscle tone:  2  2       Respiratory effort:  2  2       Total:  8  9       Apgars assigned by: SUZI HERNANDEZ RNC-OB         Operative Delivery    Forceps attempted?: No  Vacuum extractor attempted?: No         Shoulder Dystocia    Shoulder dystocia present?: No           Presentation    Presentation: Vertex  Position: Left Occiput Anterior           Interventions/Resuscitation    Method: Bulb Suctioning, Tactile Stimulation, Deep Suctioning, CPAP       Cord    Vessels: 3 vessels  Complications: None  Delayed Cord Clamping?: Yes  Cord Clamped Date/Time: 2023 11:28 PM  Cord Blood Disposition: Sent with Baby  Gases Sent?: No  Stem Cell Collection (by MD): No       Placenta    Placenta delivery date/time: 2023 2336  Placenta removal: Spontaneous  Placenta appearance: Intact  Placenta disposition: Discarded           Labor Events:       labor: No     Labor Onset Date/Time:         Dilation Complete Date/Time:         Start Pushing Date/Time:         Start Pushing Date/Time:       Rupture Date/Time: 23  043         Rupture type: SRM (Spontaneous Rupture)         Fluid Amount:       Fluid Color: Clear               steroids: None     Antibiotics given for GBS: No     Induction: none     Indications for induction:  Premature ROM     Augmentation:       Indications for augmentation:       Labor complications: None     Additional complications:          Cervical ripening:                     Delivery:      Episiotomy: None     Indication for Episiotomy:       Perineal Lacerations: 2nd Repaired:  Yes   Periurethral Laceration:   Repaired:     Labial Laceration:   " Repaired:     Sulcus Laceration:   Repaired:     Vaginal Laceration:   Repaired:     Cervical Laceration:   Repaired:     Repair suture:       Repair # of packets: 3     Last Value - EBL - Nursing (mL):       Sum - EBL - Nursing (mL): 0     Last Value - EBL - Anesthesia (mL):      Calculated QBL (mL): 458      Vaginal Sweep Performed: Yes     Surgicount Correct: Yes     Vaginal Packing: No Quantity:       Other providers:       Anesthesia    Method: Epidural          Details (if applicable):  Trial of Labor      Categorization:      Priority:     Indications for :     Incision Type:       Additional  information:  Forceps:    Vacuum:    Breech:    Observed anomalies    Other (Comments):

## 2023-09-19 NOTE — PROGRESS NOTES
"S:  Pt resting comfortably with epidural, no complaints.  Family at bedside and supportive. States feeling more consistent pelvic pressure.   Consents to SVE     O: /86   Pulse 69   Temp 97.8 °F (36.6 °C) (Oral)   Resp 18   Ht 5' 4" (1.626 m)   Wt 71.1 kg (156 lb 12 oz)   LMP 2022   SpO2 99%   Breastfeeding Yes   BMI 26.91 kg/m²     GENERAL: Calm and appropriate affect  NEURO: Alert, oriented, normal speech  ABDOMEN: Nontender, Fundus palpates soft between UC's.  FHT: Baseline 145, moderate BTBV, positive accels, no decels. Cat 1, reassuring.  CTX: q 1-2 minutes  SVE: 10/100/+2      ASSESSMENT:   38 y.o.  IUP at 39w6d, FHT reassuring/ Cat 1    Patient Active Problem List   Diagnosis    Postpartum anxiety    Pelvic floor dysfunction    Other lack of coordination    Muscle weakness    Elevated liver enzymes    History of low transverse  section - considering TOLAC    Pregnancy    Antepartum multigravida of advanced maternal age    Diastasis of rectus abdominis    Lack of coordination    Placenta previa in second trimester - RESOLVED    COVID-19 affecting pregnancy in second trimester    Full-term premature rupture of membranes    Elevated blood pressure affecting pregnancy in third trimester, antepartum    Gestational hypertension, third trimester         PLAN:  Continue close Maternal/Fetal monitoring  Will begin pushing, anticipate   Update physician team     "

## 2023-09-19 NOTE — CARE UPDATE
Resident to bedside to discuss diagnosis of PreE w/ SF (Plts).    CBC this AM showed Plt 94, previously 126.   Patient asymptomatic overall. No evidence of HA, vision changes, CP, SOB or RUQ pain.     Discussed how new findings of severe features indicates Mg infusion to reduce risk of eclampsia. Patient agrees with plan and verbalizes understanding. All questions answered. Staff agrees with plan and nurse notified.       Megan Landin MD PGY1  Obstetrics and Gynecology

## 2023-09-19 NOTE — PLAN OF CARE
Problem: Adult Inpatient Plan of Care  Goal: Plan of Care Review  Outcome: Ongoing, Progressing  Flowsheets (Taken 9/19/2023 1531)  Plan of Care Reviewed With:   patient   spouse  Pt free from falls, injury or any further trauma throughout shift. VSS. Mag initiated during shift. Fundus midline and firm, moderate lochia rubra. Continued medications as ordered. Pain adequately controlled with medications. Assistance with feeding provided as needed. Pt in no distress. Will cont to monitor.

## 2023-09-19 NOTE — SUBJECTIVE & OBJECTIVE
Interval History: Doing well, ambulating, voiding, and tolerating regular diet  Lochia: steadily decreasing  Pain: well controlled  requiring PO NSAID medication  Breasts/nipples: intact feeding well without difficulty; has not yet lactation  Depression/anxiety: denies   Support at home: good  Contraception: considering options; understands that progesterone only options are appropriate with breastfeeding  : girl is doing well, will f/u with pediatrician     Gen: A&O x 4, NAD  CV: normal HR  Lungs: normal resp effort  Breasts: bilaterally soft, non-tender, nipples intact   Abdomen: soft, non-tender, uterus firm at U - 1 fb  Perineum: approximated, no edema   Lochia: minimal rubra  Ext: bilaterally no pedal edema, without signs of DVT      Objective:     Vital Signs (Most Recent):  Temp: 97.7 °F (36.5 °C) (23)  Pulse: 63 (23)  Resp: 18 (23)  BP: 119/72 (23)  SpO2: 98 % (23) Vital Signs (24h Range):  Temp:  [97.4 °F (36.3 °C)-98.6 °F (37 °C)] 97.7 °F (36.5 °C)  Pulse:  [54-85] 63  Resp:  [18] 18  SpO2:  [96 %-100 %] 98 %  BP: (117-168)/(61-95) 119/72     Weight: 71.7 kg (158 lb 1.1 oz)  Body mass index is 27.13 kg/m².      Intake/Output Summary (Last 24 hours) at 2023 0942  Last data filed at 2023 0630  Gross per 24 hour   Intake --   Output 2258 ml   Net -2258 ml         Significant Labs:  Lab Results   Component Value Date    GROUPTRH O POS 2023    HEPBSAG Non-reactive 2023    STREPBCULT No Group B Streptococcus isolated 2023     Recent Labs   Lab 23  0421   HGB 9.6*   HCT 28.3*       I have personallly reviewed all pertinent lab results from the last 24 hours.    Physical Exam:   Constitutional: She is oriented to person, place, and time. She appears well-developed and well-nourished. No distress.    HENT:   Head: Normocephalic and atraumatic.    Eyes: Pupils are equal, round, and reactive to light.      Pulmonary/Chest:  Effort normal and breath sounds normal.        Abdominal: Bowel sounds are normal. Distension: appropriate for PPD #1.             Musculoskeletal: Normal range of motion and moves all extremeties.       Neurological: She is alert and oriented to person, place, and time.    Skin: Skin is warm and dry.    Psychiatric: She has a normal mood and affect. Her behavior is normal.       Review of Systems   Constitutional: Negative.    HENT: Negative.     Eyes: Negative.    Respiratory: Negative.     Cardiovascular: Negative.    Gastrointestinal:  Abdominal pain: cramping.   Endocrine: Negative.    Genitourinary:  Vaginal bleeding: moderate lochia. Vaginal pain: mild laceration repair site tenderness.   Musculoskeletal: Negative.    Integumentary:  Nipple discharge: lactating. Negative.   Neurological: Negative.    Hematological: Negative.    Psychiatric/Behavioral: Negative.     Breast: Nipple discharge: lactating.

## 2023-09-20 VITALS
RESPIRATION RATE: 19 BRPM | HEART RATE: 79 BPM | DIASTOLIC BLOOD PRESSURE: 70 MMHG | WEIGHT: 158.06 LBS | HEIGHT: 64 IN | BODY MASS INDEX: 26.98 KG/M2 | TEMPERATURE: 98 F | OXYGEN SATURATION: 96 % | SYSTOLIC BLOOD PRESSURE: 119 MMHG

## 2023-09-20 PROBLEM — Z98.891 HISTORY OF LOW TRANSVERSE CESAREAN SECTION: Status: RESOLVED | Noted: 2023-03-14 | Resolved: 2023-09-20

## 2023-09-20 PROBLEM — O42.92 FULL-TERM PREMATURE RUPTURE OF MEMBRANES: Status: RESOLVED | Noted: 2023-09-18 | Resolved: 2023-09-20

## 2023-09-20 PROBLEM — R74.8 ELEVATED LIVER ENZYMES: Status: RESOLVED | Noted: 2022-10-27 | Resolved: 2023-09-20

## 2023-09-20 PROBLEM — O14.10 SEVERE PRE-ECLAMPSIA: Status: ACTIVE | Noted: 2023-09-20

## 2023-09-20 LAB
BASOPHILS # BLD AUTO: 0.03 K/UL (ref 0–0.2)
BASOPHILS NFR BLD: 0.4 % (ref 0–1.9)
DIFFERENTIAL METHOD: ABNORMAL
EOSINOPHIL # BLD AUTO: 0.1 K/UL (ref 0–0.5)
EOSINOPHIL NFR BLD: 1.4 % (ref 0–8)
ERYTHROCYTE [DISTWIDTH] IN BLOOD BY AUTOMATED COUNT: 13.2 % (ref 11.5–14.5)
HCT VFR BLD AUTO: 27.8 % (ref 37–48.5)
HGB BLD-MCNC: 9.2 G/DL (ref 12–16)
IMM GRANULOCYTES # BLD AUTO: 0.02 K/UL (ref 0–0.04)
IMM GRANULOCYTES NFR BLD AUTO: 0.3 % (ref 0–0.5)
LYMPHOCYTES # BLD AUTO: 1.1 K/UL (ref 1–4.8)
LYMPHOCYTES NFR BLD: 14.4 % (ref 18–48)
MCH RBC QN AUTO: 31.2 PG (ref 27–31)
MCHC RBC AUTO-ENTMCNC: 33.1 G/DL (ref 32–36)
MCV RBC AUTO: 94 FL (ref 82–98)
MONOCYTES # BLD AUTO: 0.5 K/UL (ref 0.3–1)
MONOCYTES NFR BLD: 6.4 % (ref 4–15)
NEUTROPHILS # BLD AUTO: 6 K/UL (ref 1.8–7.7)
NEUTROPHILS NFR BLD: 77.1 % (ref 38–73)
NRBC BLD-RTO: 0 /100 WBC
PLATELET # BLD AUTO: 104 K/UL (ref 150–450)
PMV BLD AUTO: 12.5 FL (ref 9.2–12.9)
RBC # BLD AUTO: 2.95 M/UL (ref 4–5.4)
WBC # BLD AUTO: 7.83 K/UL (ref 3.9–12.7)

## 2023-09-20 PROCEDURE — 25000003 PHARM REV CODE 250

## 2023-09-20 PROCEDURE — 85025 COMPLETE CBC W/AUTO DIFF WBC: CPT | Performed by: GENERAL PRACTICE

## 2023-09-20 PROCEDURE — 63600175 PHARM REV CODE 636 W HCPCS

## 2023-09-20 PROCEDURE — 36415 COLL VENOUS BLD VENIPUNCTURE: CPT | Performed by: GENERAL PRACTICE

## 2023-09-20 PROCEDURE — 25000003 PHARM REV CODE 250: Performed by: ADVANCED PRACTICE MIDWIFE

## 2023-09-20 RX ORDER — DOCUSATE SODIUM 100 MG/1
100 CAPSULE, LIQUID FILLED ORAL 2 TIMES DAILY PRN
Qty: 30 CAPSULE | Refills: 1 | Status: SHIPPED | OUTPATIENT
Start: 2023-09-20

## 2023-09-20 RX ORDER — IBUPROFEN 600 MG/1
600 TABLET ORAL EVERY 6 HOURS PRN
Qty: 60 TABLET | Refills: 1 | Status: SHIPPED | OUTPATIENT
Start: 2023-09-20

## 2023-09-20 RX ADMIN — MAGNESIUM SULFATE HEPTAHYDRATE 2 G/HR: 40 INJECTION, SOLUTION INTRAVENOUS at 05:09

## 2023-09-20 RX ADMIN — IBUPROFEN 600 MG: 600 TABLET ORAL at 01:09

## 2023-09-20 RX ADMIN — IBUPROFEN 600 MG: 600 TABLET ORAL at 07:09

## 2023-09-20 RX ADMIN — FERROUS SULFATE TAB 325 MG (65 MG ELEMENTAL FE) 1 EACH: 325 (65 FE) TAB at 07:09

## 2023-09-20 RX ADMIN — DOCUSATE SODIUM 200 MG: 100 CAPSULE, LIQUID FILLED ORAL at 07:09

## 2023-09-20 RX ADMIN — PRENATAL VIT W/ FE FUMARATE-FA TAB 27-0.8 MG 1 TABLET: 27-0.8 TAB at 07:09

## 2023-09-20 RX ADMIN — ESCITALOPRAM OXALATE 5 MG: 5 TABLET, FILM COATED ORAL at 07:09

## 2023-09-20 NOTE — PLAN OF CARE
09/20/23 1518   Final Note   Assessment Type Final Discharge Note   Anticipated Discharge Disposition Home

## 2023-09-20 NOTE — PROGRESS NOTES
Mother baby care guide reviewed. Discharge teaching completed. Educated pt to follow up 1 week postpartum for a blood pressure and mood check. No questions at this time.

## 2023-09-20 NOTE — PLAN OF CARE
Problem:  Fall Injury Risk  Goal: Absence of Fall, Infant Drop and Related Injury  Outcome: Ongoing, Progressing     Problem: Adult Inpatient Plan of Care  Goal: Plan of Care Review  Outcome: Ongoing, Progressing  Goal: Patient-Specific Goal (Individualized)  Outcome: Ongoing, Progressing  Goal: Absence of Hospital-Acquired Illness or Injury  Outcome: Ongoing, Progressing  Goal: Optimal Comfort and Wellbeing  Outcome: Ongoing, Progressing  Goal: Readiness for Transition of Care  Outcome: Ongoing, Progressing     Problem: Breastfeeding  Goal: Effective Breastfeeding  Outcome: Ongoing, Progressing     Problem: Adjustment to Role Transition (Postpartum Vaginal Delivery)  Goal: Successful Maternal Role Transition  Outcome: Ongoing, Progressing     Problem: Bleeding (Postpartum Vaginal Delivery)  Goal: Hemostasis  Outcome: Ongoing, Progressing     Problem: Pain (Postpartum Vaginal Delivery)  Goal: Acceptable Pain Control  Outcome: Ongoing, Progressing

## 2023-09-20 NOTE — PLAN OF CARE
Problem:  Fall Injury Risk  Goal: Absence of Fall, Infant Drop and Related Injury  Outcome: Met     Problem: Adult Inpatient Plan of Care  Goal: Plan of Care Review  Outcome: Met  Goal: Patient-Specific Goal (Individualized)  Outcome: Met  Goal: Absence of Hospital-Acquired Illness or Injury  Outcome: Met  Goal: Optimal Comfort and Wellbeing  Outcome: Met  Goal: Readiness for Transition of Care  Outcome: Met     Problem: Infection  Goal: Absence of Infection Signs and Symptoms  Outcome: Met     Problem: Breastfeeding  Goal: Effective Breastfeeding  Outcome: Met     Problem: Adjustment to Role Transition (Postpartum Vaginal Delivery)  Goal: Successful Maternal Role Transition  Outcome: Met     Problem: Bleeding (Postpartum Vaginal Delivery)  Goal: Hemostasis  Outcome: Met     Problem: Infection (Postpartum Vaginal Delivery)  Goal: Absence of Infection Signs/Symptoms  Outcome: Met     Problem: Pain (Postpartum Vaginal Delivery)  Goal: Acceptable Pain Control  Outcome: Met     Problem: Urinary Retention (Postpartum Vaginal Delivery)  Goal: Effective Urinary Elimination  Outcome: Met

## 2023-09-20 NOTE — PROGRESS NOTES
Pt continues MgSO4 this morning    Social rounds per CNM - currently proceeding with collaborative OB/GYN physician led care at this time, secondary to high risk status.    Amarilis James CNM

## 2023-09-20 NOTE — DISCHARGE SUMMARY
Delivery Discharge Summary  Obstetrics      Primary OB Clinician: Calderon Alfonso MD     Admission date: 2023  Discharge date: 2023    Disposition: To home, self care    Discharge Diagnosis List:  Patient Active Problem List   Diagnosis    Postpartum anxiety    Pelvic floor dysfunction    Other lack of coordination    Muscle weakness    Pregnancy    Antepartum multigravida of advanced maternal age    Diastasis of rectus abdominis    Lack of coordination    Placenta previa in second trimester - RESOLVED    COVID-19 affecting pregnancy in second trimester    , delivered    Obstetrical laceration, second degree    Anemia    Severe pre-eclampsia       Procedure:      Hospital Course:  Loren Dickerson is a 38 y.o. now , PPD # who was admitted on 2023 at 39w6d for PROM and TOLAC. Patient was subsequently admitted to labor and delivery unit with signed consents.     Labor course was uncomplicated and resulted in  without complications.     Please see delivery note for further details. Her postpartum course was complicated by pre E with severe features. Patient received magnesium for seizure prophylaxis. She did not require any antihypertensives. On discharge day, patient's pain is controlled with oral pain medications. Pt is tolerating ambulation without SOB or CP, and regular diet without N/V. Reports lochia is mild. Denies any HA, vision changes, F/C, LE swelling. Denies any breast pain/soreness.    Pt in stable condition and ready for discharge. She has been instructed to start and/or continue medications and follow up with her obstetrics provider as listed below.    Abdomen: Soft, appropriately tender   Uterus: Firm, no fundal tenderness   Incision: N/A     Temp:  [97.6 °F (36.4 °C)-98.2 °F (36.8 °C)] 97.6 °F (36.4 °C)  Pulse:  [77-95] 79  Resp:  [12-19] 19  SpO2:  [95 %-100 %] 96 %  BP: (107-152)/(56-90) 119/70     Pertinent studies:  CBC  Recent Labs   Lab  "23  1209 23  0421   WBC 7.46 12.04   HGB 12.5 9.6*   HCT 36.8* 28.3*   MCV 92 92   * 94*        Immunization History   Administered Date(s) Administered    COVID-19 Vaccine 2021, 2021    COVID-19, MRNA, LN-S, PF (Pfizer) (Purple Cap) 2021    Influenza - Quadrivalent - MDCK 2020    Tdap 10/28/2020, 2023        Delivery:    Episiotomy: None   Lacerations: 2nd   Repair suture:     Repair # of packets: 3   Blood loss (ml):       Birth information:  YOB: 2023   Time of birth: 11:27 PM   Sex: female   Delivery type: , Spontaneous   Gestational Age: 39w6d     Measurements    Weight: 3890 g  Weight (lbs): 8 lb 9.2 oz  Length: 53.3 cm  Length (in): 21"  Head circumference: 34.3 cm  Chest circumference: 34.3 cm         Delivery Clinician: Delivery Providers    Delivering clinician: Maryellen Guerra CNM   Provider Role    Brittany Foley RN Cunningham, Claire, RN Vicknair, Kristine, RN              Additional  information:  Forceps:    Vacuum:    Breech:    Observed anomalies      Living?:     Apgars    Living status: Living  Apgar Component Scores:  1 min.:  5 min.:  10 min.:  15 min.:  20 min.:    Skin color:  0  1       Heart rate:  2  2       Reflex irritability:  2  2       Muscle tone:  2  2       Respiratory effort:  2  2       Total:  8  9       Apgars assigned by: SUZI HERNANDEZ RNC-OB         Placenta: Delivered:       appearance    Patient Instructions:   Current Discharge Medication List        START taking these medications    Details   docusate sodium (COLACE) 100 MG capsule Take 1 capsule (100 mg total) by mouth 2 (two) times daily as needed for Constipation.  Qty: 30 capsule, Refills: 1      ibuprofen (ADVIL,MOTRIN) 600 MG tablet Take 1 tablet (600 mg total) by mouth every 6 (six) hours as needed for Pain.  Qty: 60 tablet, Refills: 1           CONTINUE these medications which have NOT CHANGED    Details   EScitalopram oxalate " (LEXAPRO) 5 MG Tab TAKE 1 TABLET BY MOUTH EVERY DAY  Qty: 90 tablet, Refills: 0      LORazepam (ATIVAN) 0.5 MG tablet Take 0.5 mg by mouth daily as needed.      PNV-DHA 27 mg iron-1 mg -300 mg Cap TAKE 1 CAPSULE BY MOUTH ONCE DAILY AT 6AM.  Qty: 30 capsule, Refills: 35             Discharge Procedure Orders   Lifting restrictions   Order Comments: No lifting more than infant for 6 weeks.     Other restrictions (specify):     No driving until:   Order Comments: No driving until no longer taking narcotic medications.     Pelvic Rest   Order Comments: Nothing in the vagina including intercourse for 6 weeks.     Notify your health care provider if you experience any of the following:  temperature >100.4     Notify your health care provider if you experience any of the following:  persistent nausea and vomiting or diarrhea     Notify your health care provider if you experience any of the following:  severe uncontrolled pain     Notify your health care provider if you experience any of the following:  redness, tenderness, or signs of infection (pain, swelling, redness, odor or green/yellow discharge around incision site)     Notify your health care provider if you experience any of the following:  severe persistent headache     Notify your health care provider if you experience any of the following:  persistent dizziness, light-headedness, or visual disturbances     Activity as tolerated        Follow-up Information       Margarita Ryan CNM Follow up in 1 week(s).    Specialty: Obstetrics and Gynecology  Why: BP check or on connected MOM  Contact information:  [address] [id number] [phone]               Margarita Ryan CNM Follow up in 6 week(s).    Specialty: Obstetrics and Gynecology  Why: post partum visit  Contact information:  [address] [address] [phone]                              Gwen Londono MD  OBGYN PGY-2

## 2023-09-21 ENCOUNTER — TELEPHONE (OUTPATIENT)
Dept: OBSTETRICS AND GYNECOLOGY | Facility: CLINIC | Age: 38
End: 2023-09-21
Payer: COMMERCIAL

## 2023-09-21 ENCOUNTER — PATIENT MESSAGE (OUTPATIENT)
Dept: OBSTETRICS AND GYNECOLOGY | Facility: CLINIC | Age: 38
End: 2023-09-21
Payer: COMMERCIAL

## 2023-09-21 NOTE — TELEPHONE ENCOUNTER
Called patient to discussed issues with connected moms and need to have BP taken within 72 hours of discharge. Patient took BP with connected moms, 117/75. Not crossing over due to pregnancy episode being resolved. Patient states that she cannot make the appointment tomorrow for BP check regardless. Denies neuro symptoms. Sent in photo of connected moms data.

## 2023-09-21 NOTE — LACTATION NOTE
23 1230   Maternal Assessment   Breast Shape Bilateral:;round   Breast Density Bilateral:;soft   Areola Bilateral:;elastic   Nipples Bilateral:;everted   Maternal Infant Feeding   Maternal Preparation breast care;hand hygiene   Maternal Emotional State assist needed;relaxed   Infant Positioning clutch/football;cross-cradle   Signs of Milk Transfer audible swallow;infant jaw motion present   Pain with Feeding no   Comfort Measures Before/During Feeding infant position adjusted;latch adjusted;maternal position adjusted;suction broken using finger   Comfort Measures Following Feeding air-drying encouraged;expressed milk applied   Latch Assistance yes   Community Referrals   Community Referrals outpatient lactation program;pediatric care provider;support group;WIC (women, infants and children) program     LC to room: LC assisted client with  positioning and latch, stated felt much more comfortable and latching deeper.     Discharge education provided utilizing Breastfeeding guide handout. Feeding on cue, frequency and duration reviewed, intake amount and diaper counts expected on current day of life up to day 6 reviewed, engorgement prevention and relief measures reviewed. Pump information reviewed, Rx pump printed and given to client. Community resources, risk hotline, and warmline extension provided. Extension on whiteboard, all questions answered, client and FOB verbalized understanding.  Discharge education complete

## 2023-09-22 ENCOUNTER — PATIENT MESSAGE (OUTPATIENT)
Dept: OBSTETRICS AND GYNECOLOGY | Facility: OTHER | Age: 38
End: 2023-09-22
Payer: COMMERCIAL

## 2023-09-25 NOTE — PHYSICIAN QUERY
PT Name: Loren Dickerson  MR #: [id number]    DOCUMENTATION CLARIFICATION      CDS/: JOSE Palomino,RNC-MNN       Contact information:[email or web address]    This form is a permanent document in the medical record.      Query Date: 2023    By submitting this query, we are merely seeking further clarification of documentation. Please utilize your independent clinical judgment when addressing the question(s) below.    The Medical Record contains the following:   Indicators  Supporting Clinical Findings Location in Medical Record   X Anemia documented Anemia OB Progress note @945am   X H&H Recent Labs   Lab 23  1209 23  0421   WBC 7.46 12.04   HGB 12.5 9.6*   HCT 36.8* 28.3*    Discharge Summary     BP                    HR      Bleeding     X Procedure/Surgery Performed/EBL Normal spontaneous vaginal delivery of live infant     Calculated QBL (mL): 458 L&D Delivery note     Transfusion(s)     X Acute/Chronic illness Labor course was uncomplicated and resulted in  without complications    Her postpartum course was complicated by pre E with severe features Discharge Summary    X Treatments Iron PP  OB Progress note @945am    Other       Provider, please specify diagnosis or diagnoses associated with above clinical findings.   [  x ] Acute blood loss anemia    [   ] Iron deficiency anemia    [   ] Other : _________________   [   ] Clinically Undetermined     Please document in your progress notes daily for the duration of treatment, until resolved, and include in your discharge summary.    Form No. [id number]

## 2023-09-25 NOTE — PHYSICIAN QUERY
PT Name: Loren Dickerson  MR #: [id number]     DOCUMENTATION CLARIFICATION     CDS/: JOSE Palomino,RNC-MNN        Contact information:[email or web address]  This form is a permanent document in the medical record.    Query Date: September 25, 2023  By submitting this query, we are merely seeking further clarification of documentation. Please utilize your independent clinical judgment when addressing the question(s) below.      Indicators Supporting Clinical Findings Location in Medical Record   X Documentation of uterine atony with bleeding, postpartum bleeding, or hemorrhage Significant amount of bleeding was from second degree tear L&D Delivery note 9/19    Documentation of retained placenta     X Delivery type with EBL or QBL Normal spontaneous vaginal delivery of live infant    Calculated QBL (mL): 458 L&D Delivery note 9/19   X H/H Recent Labs   Lab 09/18/23  1209 09/19/23  0421   WBC 7.46 12.04   HGB 12.5 9.6*   HCT 36.8* 28.3*    Discharge Summary 9/20    Vital signs     X Medications, Treatment IV pitocin given   2nd degree laceration noted and repaired in normal fashion with 2-0 chromic and 3-0 vicryl achieving good hemostasis and approximation .  L&D Delivery note 9/19    Blood transfusion      Other        Provider, please specify the diagnosis or diagnoses associated with the above clinical findings:      [   ] Immediate post-partum hemorrhage   [  x ] Postpartum bleeding, clinically insignificant ** bleeding was not PPH; was from bleeding laceration   [   ] Other hematological diagnosis (please specify): _________________     Please document in your progress notes daily for the duration of treatment, until resolved, and include in your discharge summary.  Form [id number]

## 2023-09-26 ENCOUNTER — PATIENT MESSAGE (OUTPATIENT)
Dept: REHABILITATION | Facility: OTHER | Age: 38
End: 2023-09-26
Payer: COMMERCIAL

## 2023-10-02 ENCOUNTER — PATIENT MESSAGE (OUTPATIENT)
Dept: OBSTETRICS AND GYNECOLOGY | Facility: CLINIC | Age: 38
End: 2023-10-02
Payer: COMMERCIAL

## 2023-10-04 ENCOUNTER — PATIENT MESSAGE (OUTPATIENT)
Dept: OBSTETRICS AND GYNECOLOGY | Facility: CLINIC | Age: 38
End: 2023-10-04
Payer: COMMERCIAL

## 2023-10-04 DIAGNOSIS — O22.40: Primary | ICD-10-CM

## 2023-10-04 RX ORDER — HYDROCORTISONE 25 MG/G
CREAM TOPICAL 2 TIMES DAILY
Qty: 20 G | Refills: 0 | Status: SHIPPED | OUTPATIENT
Start: 2023-10-04

## 2023-10-09 ENCOUNTER — PATIENT MESSAGE (OUTPATIENT)
Dept: REHABILITATION | Facility: OTHER | Age: 38
End: 2023-10-09
Payer: COMMERCIAL

## 2023-10-16 ENCOUNTER — PATIENT MESSAGE (OUTPATIENT)
Dept: OBSTETRICS AND GYNECOLOGY | Facility: CLINIC | Age: 38
End: 2023-10-16
Payer: COMMERCIAL

## 2023-10-30 ENCOUNTER — POSTPARTUM VISIT (OUTPATIENT)
Dept: OBSTETRICS AND GYNECOLOGY | Facility: CLINIC | Age: 38
End: 2023-10-30
Payer: COMMERCIAL

## 2023-10-30 VITALS
HEIGHT: 64 IN | DIASTOLIC BLOOD PRESSURE: 70 MMHG | SYSTOLIC BLOOD PRESSURE: 120 MMHG | WEIGHT: 127.88 LBS | BODY MASS INDEX: 21.83 KG/M2

## 2023-10-30 DIAGNOSIS — O34.219 VBAC, DELIVERED: ICD-10-CM

## 2023-10-30 PROBLEM — U07.1 COVID-19 AFFECTING PREGNANCY IN SECOND TRIMESTER: Status: RESOLVED | Noted: 2023-05-10 | Resolved: 2023-10-30

## 2023-10-30 PROBLEM — F41.8 POSTPARTUM ANXIETY: Status: RESOLVED | Noted: 2021-04-30 | Resolved: 2023-10-30

## 2023-10-30 PROBLEM — O98.512 COVID-19 AFFECTING PREGNANCY IN SECOND TRIMESTER: Status: RESOLVED | Noted: 2023-05-10 | Resolved: 2023-10-30

## 2023-10-30 PROBLEM — O09.529 ANTEPARTUM MULTIGRAVIDA OF ADVANCED MATERNAL AGE: Status: RESOLVED | Noted: 2023-03-14 | Resolved: 2023-10-30

## 2023-10-30 PROBLEM — Z34.90 PREGNANCY: Status: RESOLVED | Noted: 2023-03-14 | Resolved: 2023-10-30

## 2023-10-30 PROBLEM — O44.02 PLACENTA PREVIA IN SECOND TRIMESTER: Status: RESOLVED | Noted: 2023-05-09 | Resolved: 2023-10-30

## 2023-10-30 PROBLEM — O14.10 SEVERE PRE-ECLAMPSIA: Status: RESOLVED | Noted: 2023-09-20 | Resolved: 2023-10-30

## 2023-10-30 PROBLEM — D64.9 ANEMIA: Status: RESOLVED | Noted: 2023-09-19 | Resolved: 2023-10-30

## 2023-10-30 PROCEDURE — 0503F POSTPARTUM CARE VISIT: CPT | Mod: CPTII,S$GLB,,

## 2023-10-30 PROCEDURE — 99999 PR PBB SHADOW E&M-EST. PATIENT-LVL III: ICD-10-PCS | Mod: PBBFAC,,,

## 2023-10-30 PROCEDURE — 99999 PR PBB SHADOW E&M-EST. PATIENT-LVL III: CPT | Mod: PBBFAC,,,

## 2023-10-30 PROCEDURE — 0503F PR POSTPARTUM CARE VISIT: ICD-10-PCS | Mod: CPTII,S$GLB,,

## 2023-10-30 NOTE — PROGRESS NOTES
Postpartum Visit  Loren Dickerson is a 38 y.o. female  is here for a postpartum visit. She is 6 weeks postpartum following a , of a female infant weighinlb 9oz, with Anesthesia: epidural. The delivery was at 39w 6d.     Pregnancy was complicated by: postpartum pre-eclampsia with severe features.  She speaks positively about her birth experience.    Postpartum course has been uncomplicated.    Bleeding staining only. Bowel/ bladder function is normal. Hemorrhoidal pain with defecation and prolonged sitting.  Her last pap was: 3/2022, NILM, neg HR HPV.    Patient is not sexually active.   Desired contraception method is  partner vasectomy .     Postpartum depression screening: negative. EPDS 2.    Baby's course has been uncomplicated. Baby is feeding by breast.     ROS:  GENERAL: No fever, chills, fatigability.  VULVAR: No pain, no lesions and no itching.  VAGINAL: No relaxation, no itching, no discharge, no abnormal bleeding and no lesions.  ABDOMEN: No abdominal pain. Denies nausea. Denies vomiting. No diarrhea. No constipation  BREAST: Denies pain. No lumps. No discharge.  URINARY: No incontinence, no nocturia, no frequency and no dysuria.  CARDIOVASCULAR: No chest pain. No shortness of breath. No leg cramps.  NEUROLOGICAL: No headaches. No vision changes.    Past Medical History:   Diagnosis Date    Gestational hypertension, third trimester 2023    Migraines 2020    Only during the beginning of pregnancy    Obstetrical laceration, second degree 2023    Postpartum anxiety 2021    Loren is seeing a therapist. Patient is not interested in medications that she must take daily. Psychiatry referral order placed and staff message sent to Dr. Safia Hurtado's staff requesting appointment.    PROM (premature rupture of membranes) 2021    Severe pre-eclampsia 2023    , delivered 2023     Past Surgical History:   Procedure Laterality Date     SECTION N/A  2021    Procedure:  SECTION;  Surgeon: Jacquelyn Jesus MD;  Location: [facility] L&D;  Service: OB/GYN;  Laterality: N/A;     SECTION      WISDOM TOOTH EXTRACTION      No complications     Review of patient's allergies indicates:  No Known Allergies    Current Outpatient Medications:     docusate sodium (COLACE) 100 MG capsule, Take 1 capsule (100 mg total) by mouth 2 (two) times daily as needed for Constipation., Disp: 30 capsule, Rfl: 1    EScitalopram oxalate (LEXAPRO) 5 MG Tab, TAKE 1 TABLET BY MOUTH EVERY DAY, Disp: 90 tablet, Rfl: 0    hydrocortisone 2.5 % cream, Apply topically 2 (two) times daily., Disp: 20 g, Rfl: 0    ibuprofen (ADVIL,MOTRIN) 600 MG tablet, Take 1 tablet (600 mg total) by mouth every 6 (six) hours as needed for Pain., Disp: 60 tablet, Rfl: 1    LORazepam (ATIVAN) 0.5 MG tablet, Take 0.5 mg by mouth daily as needed., Disp: , Rfl:     PNV-DHA 27 mg iron-1 mg -300 mg Cap, TAKE 1 CAPSULE BY MOUTH ONCE DAILY AT 6AM., Disp: 30 capsule, Rfl: 35      Vitals:    10/30/23 1120   BP: 120/70       General appearance - alert, well appearing, and in no distress and oriented to person, place, and time  Mental status - alert, oriented to person, place, and time, normal mood, behavior, speech, dress, motor activity, and thought processes  Skin - coloration normal for race, good turgor, warm to touch, no rashes  Abdomen - soft, nontender, nondistended, no masses or organomegaly  Pelvic -   External genitalia postpartum: normal, well-healed, without lesions or masses.  Normal female hair distribution. Adequate perineal body. Urethral meatus without lesions or prolapse. Urethra: no masses, tenderness, or scarring.  Bladder: without tenderness or masses.  Vaginal mucosa moist and pink, normal rugae, without lesions, abnormal discharge, or foul odor. Single undissolved suture at introitus; tender to palpation. Cervix pink, no lesions, no cervical motion tenderness.  Uterus: midline, non  tender, smooth, not enlarged, not prolapsed  No adnexal masses or tenderness.  External hemorrhoid at 1200', patient endorses pain with this  Extremities - no edema, redness or tenderness in the calves or thighs      Loren was seen today for postpartum care.    Diagnoses and all orders for this visit:    Encounter for routine postpartum follow-up    , delivered    Obstetrical laceration, second degree      Discussed contraception - pt desires partner vasectomy  Counseling regarding resuming normal activities of exercise and work.  Counseled to wait one addl week due to undissolved suture  Postpartum precautions reviewed  Encouraged preparation H with topical lidocaine    Routine follow up in 1 yr    Margarita Ryan CNM

## 2023-11-01 ENCOUNTER — CLINICAL SUPPORT (OUTPATIENT)
Dept: REHABILITATION | Facility: OTHER | Age: 38
End: 2023-11-01
Attending: FAMILY MEDICINE
Payer: COMMERCIAL

## 2023-11-01 DIAGNOSIS — R19.8 ABDOMINAL WEAKNESS: ICD-10-CM

## 2023-11-01 PROCEDURE — 97161 PT EVAL LOW COMPLEX 20 MIN: CPT

## 2023-11-01 PROCEDURE — 97112 NEUROMUSCULAR REEDUCATION: CPT

## 2023-11-01 PROCEDURE — 97530 THERAPEUTIC ACTIVITIES: CPT

## 2023-11-02 NOTE — PLAN OF CARE
[facility]  Pelvic Health Physical Therapy Initial Evaluation    Date: 2023   Name: Loren Levin [facility] Number: [id number]  Therapy Diagnosis: No diagnosis found.  Physician: Maryellen Guerra CNM    Physician Orders: PT Eval and Treat  Medical Diagnosis from Referral: pelvic floor dysfunction  Evaluation Date: 2023  Authorization Period Expiration: 1 visit  Plan of Care Expiration: 24  Visit # / Visits authorized:     Time In: 230  Time Out: 325  Total Appointment Time (timed & untimed codes): 55 minutes    Precautions: universal    Subjective     Date of onset: 23    History of current condition - Loren reports: Had  in . So different than her first. Recovery has been good. Has some tenderness in pelvic area and has bothersome hemorrhoids, which are most prominent when lifting her 1 y/o (31#). Also has some pelvic heaviness with lifting. At midwives appt said there was still one stitch. Had second degree tear. Breastfeeding. Had NATALY from first pregnancy, that was  with long and difficult labor. NATALY improved between pregnancies, came back quickly within second pregnancy. Having low back pain. Using steroid cream and OTC for hemorrhoids.     OB/GYN History: , vaginal delivery, and caesarean  Surgical History:   Birth Control: none  History of chronic yeast, BV or UTIs? No  Sexually active? No - midwives said to wait one more week  Pain with intercourse? No hx of  Pleasure with sex? yes  Ability to achieve orgasm? yes    Bladder/Bowel History:   Frequency of urination:   Daytime: WNL           Nighttime: 0  Difficulty initiating urine stream: No  Urine stream: strong  Complete emptying: Yes  Push to empty bladder: No  Bladder leakage: No  Frequency of incidents/Type of incontinence: none  Frequency of bowel movements: once a day  Difficulty initiating BM: No  Quality/Shape of BM: [place] Stool Chart 4  Does Patient Feel Empty after BM?  Yes  Fiber Supplements or Laxative Use? Yes - stool softener on 3-4 days/week  Colon leakage: No  Frequency of incidents: none   Fecal Urgency: during pregnancy, but better now  Form of protection: none  Number of pads required in 24 hours: none  History of coccyx injury: No    Prolapse Screening:  Feeling of vaginal bulge: heaviness with lifting, has some back and lower abd pain    Pain:  Location: back - lumbar - central  Current 0/10, worst 5/10  Aggravating Factors/Activities that cause symptoms: worse end of day, bending and lifting baby  Easing Factors: lying down, rest     Medical History: Loren  has a past medical history of Gestational hypertension, third trimester (2023), Migraines (), Obstetrical laceration, second degree (2023), Postpartum anxiety (2021), PROM (premature rupture of membranes) (2021), Severe pre-eclampsia (2023), and , delivered (2023).     Surgical History: Loren Dickerson  has a past surgical history that includes [place] tooth extraction ();  section (N/A, 2021); and  section.    Medications: Loren has a current medication list which includes the following prescription(s): docusate sodium, escitalopram oxalate, hydrocortisone, ibuprofen, lorazepam, and pnv-dha.    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    Types of fluid intake: water- 80 oz  and coffee, some alcohol  Diet: TBA     Abuse/Neglect: No     Pts goals: to work on NATALY and to improve symptoms of hemorrhoids    OBJECTIVE     See EMR under MEDIA for written consent provided  11/1/2023  Chaperone: Declined    ORTHO SCREEN  Posture in sitting: WNL  Posture in standing: WNL, b/l fem IR, genu recurvatum  Pelvic alignment: no sign of deviations noted in supine   SLS: fair  Lumbar ROM: full and painfree  Pubic symphysis: non-tender    ABDOMINAL WALL ASSESSMENT  Palpation: WNL  Abdominal strength: poor load transfer, dysfx activation pattern  Scarring: pfannenstiel incision - no restrictions or pain  Pelvic Floor Muscle and Transverse Abdominus Synergy: present  Diastasis: present 2 fingers at umbilicus, 1 finger above, closed below     BREATHING MECHANICS ASSESSMENT   Thorax Assessment During Quiet Respiration: Decreased excursion bilaterally of lateral ribs   Thorax Assessment During Deep Respiration: Excessive excursion of abdominal wall     VAGINAL PELVIC FLOOR EXAM    EXTERNAL ASSESSMENT  Introitus: WNL  Skin condition: WNL  Scarring: perineal scar noted 5-6 o'clock   Sensation: WNL   Pain: none  Voluntary contraction: visible lift  Voluntary relaxation: visible drop  Involuntary contraction: bulge  Bearing down: visible drop  Perineal descent: absent  Comments: na      INTERNAL ASSESSMENT  Pain: tender areas noted as follows: R STP, b/l post-pube, iliococcygeus, OI   Sensation: able to localized pressure appropriately   Vaginal vault: WNL   Muscle Bulk: increased tone   Muscle Power: 2/5  Muscle Endurance: 10 sec  # Reps To Fatigue: NT    Fast Contractions in 10 seconds: NT     Quality of contraction: incomplete relaxation   Specificity: WNL   Coordination: WNL   Prolapse check: none  Comments: na    TREATMENT     Treatment Time In: 300  Treatment Time Out: 325  Total Treatment time (time-based codes) separate from Evaluation: 25 minutes    Neuromuscular Re-education to develop Coordination, Control, and Proprioception for 15 minutes including:   RUSI for breathing, drops, contractions of the PFM to help pt visualize PFM motion and function.  TrA isolation with assessment abd wall and  bladder base  360 breathing  Pelvic floor mm contractions with focus on layers    Therapeutic Activity to develop body mechanics and decrease pain for 10 minutes including:   Body mechanics for bending and lifting, practice with 15# weight, breath coordination  Recommend visualize vulva with HH mirror and can initiate scar massage if ready  Hold steroid cream for hemorrhoids for 2 weeks, switch to witch hazel to allow for full perineal scar healing    Patient Education provided:   general anatomy/physiology of urinary/ bowel  system and benefits of treatment were discussed with the pt. Additionally, anatomy/physiology of pelvic floor and diaphragmatic breathing were reviewed.     Home Exercises provided:  Written Home Exercises provided: Yes  Exercises were reviewed and Loren was able to demonstrate them prior to the end of the session.    Loren demonstrated good  understanding of the education provided.     See EMR under Patient Instructions for exercises provided 11/1/2023.    Assessment     Loren is a 38 y.o. female referred to outpatient Physical Therapy with a medical diagnosis of pelvic floor dysfunction. Pt presents with poor knowledge of body mechanics and posture, poor trunk stability, pelvic floor tenderness, decreased pelvic muscle strength, increased tension of the pelvic muscles, and unable to co-contract or co-relax abdominal wall and pelvic floor muscles. Increased tone and poor coordination of layers noted with pelvic exam. Some TTP. Initiated PFME for downtraining and coordination. Initiated TrA training. Will allow her to build deep core synergy and than add on pelvic girdle stabilization. Rec hold steroid cream for hemorrhoids for 2 weeks to allow perineal laceration to fully heal.      Pt prognosis is Excellent  Pt will benefit from skilled outpatient Physical Therapy to address the deficits stated above and in the chart below, provide pt/family education, and to maximize pt's level of  independence.     Plan of care discussed with patient: Yes  Pt's spiritual, cultural and educational needs considered and patient is agreeable to the plan of care and goals as stated below:     Anticipated Barriers for therapy: None    Medical Necessity is demonstrated by the following:    History  Co-morbidities and personal factors that may impact the plan of care Co-morbidities   multiparous    Personal Factors  no deficits     moderate   Examination  Body structures and functions, activity limitations and participation restrictions that may impact the plan of care Body Regions/Systems/Functions:  poor knowledge of body mechanics and posture, poor trunk stability, pelvic floor tenderness, decreased pelvic muscle strength, increased tension of the pelvic muscles, and unable to co-contract or co-relax abdominal wall and pelvic floor muscles     Activity Limitations:  intercourse/vaginal exam/tampon use without pain    Participation Restrictions:  ADL participation affected by pain    Activity limitations:   Learning and applying knowledge  No deficits    General Tasks and Commands  No deficits    Communication  No deficits    Mobility  No deficits    Self care  No deficits    Domestic Life  No deficits    Interactions/Relationships  No deficits    Life Areas  No deficits    Community and Social Life  No deficits       moderate   Clinical Presentation stable and uncomplicated low   Decision Making/ Complexity Score: low       Goals:  Short Term Goals: 4 weeks   Pt indep in HEP  Pt indep in functional brace technique for decreased strain of pelvic structures with activities which increase IAP  Pt demo pelvic floor mm strength at least 3/5 for improved support of pelvic organs    Long Term Goals: 8 weeks   Pt indep in progressive HEP  Pt reports able to have painfree intercourse for improved participation in ADLs and improved intimacy with partner  Pt demo pelvic floor mm strength at least 3+/5 for improved support of  pelvic organs  Pt able to return to regular yoga practice without limitation    Plan     Plan of care Certification: 11/1/2023 to 1/30/24.    Outpatient Physical Therapy 1 times weekly for 12 weeks to include the following interventions: therapeutic exercises, therapeutic activity, neuromuscular re-education, manual therapy, patient/family education and self care/home management    Christine Gonsales, PT

## 2023-11-03 PROBLEM — R19.8 ABDOMINAL WEAKNESS: Status: ACTIVE | Noted: 2023-11-03

## 2023-11-03 PROBLEM — R27.9 LACK OF COORDINATION: Status: RESOLVED | Noted: 2023-03-30 | Resolved: 2023-11-03

## 2023-11-03 PROBLEM — M62.08 DIASTASIS OF RECTUS ABDOMINIS: Status: RESOLVED | Noted: 2023-03-30 | Resolved: 2023-11-03

## 2023-11-08 ENCOUNTER — CLINICAL SUPPORT (OUTPATIENT)
Dept: REHABILITATION | Facility: OTHER | Age: 38
End: 2023-11-08
Attending: ADVANCED PRACTICE MIDWIFE
Payer: COMMERCIAL

## 2023-11-08 DIAGNOSIS — R19.8 ABDOMINAL WEAKNESS: Primary | ICD-10-CM

## 2023-11-08 PROCEDURE — 97112 NEUROMUSCULAR REEDUCATION: CPT

## 2023-11-08 PROCEDURE — 97140 MANUAL THERAPY 1/> REGIONS: CPT

## 2023-11-08 NOTE — PROGRESS NOTES
Pelvic Health Physical Therapy   Treatment Note     Name: Loren Levin Douglas  Clinic Number: [id number]    Therapy Diagnosis:   Encounter Diagnosis   Name Primary?    Abdominal weakness Yes     Physician: Amarilis James CNM    Visit Date: 2023    Physician Orders: PT Eval and Treat  Medical Diagnosis from Referral: pelvic floor dysfunction  Evaluation Date: 2023  Authorization Period Expiration: 10 visits  Plan of Care Expiration: 24  Visit # / Visits authorized: 210    Cancelled Visits: 0  No Show Visits: 0    Time In: 1105  Time Out: 1155  Total Billable Time: 50 minutes    Precautions: Standard    Subjective     Pt reports: Still feels tender from the stitches that she feels at the end of the day, but heaviness feels less. Exercises going well and incorporating lifting strategies well. Back pain has been better. Has been avoiding things that hurt it like bending at weird angle.  She was compliant with home exercise program.  Response to previous treatment: felt fine  Functional change: decreased heaviness    Pain: 0/10  Location:     Objective   Pt verbally consents to intravaginal treatment today.  Signed consent form already on file.    Loren received therapeutic exercises to develop  strength and endurance for 00 minutes including:  not performed today    Loren received the following manual therapy techniques: to develop flexibility and extensibility for 10 minutes including: trigger point/myofascial release of pelvic floor mm and scar mobilization of perineal laceration     Loren participated in neuromuscular re-education activities to develop Coordination, Control, and Proprioception for 40 minutes includin breathing   Contractions focus on layers  TrA isolation - assessed with palpation and TAUS  TrA + trunk curls  Assessed NATALY with curls, with and without TrA  Bridging with pelvic floor and TrA co-contract  SL hip abd cues for no pelvic hiking and isolation of glut  teofilo    Loren participated in dynamic functional therapeutic activities to improve functional performance for 00  minutes, including:  Not performed today     Home Exercises Provided and Patient Education Provided     Education provided:   - anatomy/physiology of pelvic floor, diaphragmatic breathing, isometric abdominal exercises, and kegels  Discussed progression of plan of care with patient; educated pt in activity modification; reviewed HEP with pt. Pt demonstrated and verbalized understanding of all instruction and was provided with a handout of HEP (see Patient Instructions).    Written Home Exercises Provided: Patient instructed to cont prior HEP.  Exercises were reviewed and Loren was able to demonstrate them prior to the end of the session.  Loren demonstrated good  understanding of the education provided.     See EMR under Patient Instructions for exercises provided 11/8/2023.    Assessment     Excellent demo of PFME. With TrA initially not sure performing correctly but confirmed with TAUS. She does have strong pelvic floor co-contract and recommended doing breathing after core exercises at home to promote relaxation. Progressed TrA for trunk curls as low tone rectus abdominus and she is rahul to do this with good tensioning at LA and narrowing of IRD, confirmed with TAUS. Hip weakness noted and progressed program for glut med and max strengthening.  Loren Is progressing well towards her goals.   Pt prognosis is Excellent.     Pt will continue to benefit from skilled outpatient physical therapy to address the deficits listed in the problem list box on initial evaluation, provide pt/family education and to maximize pt's level of independence in the home and community environment.     Pt's spiritual, cultural and educational needs considered and pt agreeable to plan of care and goals.     Anticipated barriers to physical therapy: none    Goals: Short Term Goals: 4 weeks   Pt indep in HEP  Pt indep in  functional brace technique for decreased strain of pelvic structures with activities which increase IAP  Pt demo pelvic floor mm strength at least 3/5 for improved support of pelvic organs     Long Term Goals: 8 weeks   Pt indep in progressive HEP  Pt reports able to have painfree intercourse for improved participation in ADLs and improved intimacy with partner  Pt demo pelvic floor mm strength at least 3+/5 for improved support of pelvic organs  Pt able to return to regular yoga practice without limitation     Plan      Plan of care Certification: 11/1/2023 to 1/30/24.     Outpatient Physical Therapy 1 times weekly for 12 weeks to include the following interventions: therapeutic exercises, therapeutic activity, neuromuscular re-education, manual therapy, patient/family education and self care/home management    Next visit: check and progress hips, check and progress TrA, check trunk curls, scar mob?    Christine Gonsales, PT

## 2023-11-08 NOTE — PATIENT INSTRUCTIONS
Home Program 11/8/23:      Lay on mat with knees bent. Tighten pelvic floor and deep abdominals, then tuck pelvis and lift hips up off table.  Do 10 times and perform 3 sets.     Sidelying leg lifts - lay on side, bottom leg bent, top leg straight. Top of pelvis rolled forward and leg back in line with trunk. Lift and lower top leg. 3 sets to fatigue.      Transverse Abdominus isolation        First, find neutral pelvis position. Gently tighten deep core muscles. Do not hold breath, may be easier to contract on an exhalation. Should feel a firming up and no outward or downward bulging.     Repeat 10 times. Perform 1 sets.     Then add trunk curls to above for 2 additional sets of 10, holding each for 3 seconds.

## 2023-11-10 NOTE — TELEPHONE ENCOUNTER
No care due was identified.  [facility] Embedded Care Due Messages. Reference number: [id number].   11/10/2023 8:42:05 AM CST

## 2023-11-11 RX ORDER — ESCITALOPRAM OXALATE 5 MG/1
5 TABLET ORAL
Qty: 90 TABLET | Refills: 0 | Status: SHIPPED | OUTPATIENT
Start: 2023-11-11 | End: 2024-02-08 | Stop reason: SDUPTHER

## 2023-11-29 ENCOUNTER — PATIENT MESSAGE (OUTPATIENT)
Dept: OBSTETRICS AND GYNECOLOGY | Facility: CLINIC | Age: 38
End: 2023-11-29
Payer: COMMERCIAL

## 2023-11-29 ENCOUNTER — CLINICAL SUPPORT (OUTPATIENT)
Dept: REHABILITATION | Facility: OTHER | Age: 38
End: 2023-11-29
Attending: ADVANCED PRACTICE MIDWIFE
Payer: COMMERCIAL

## 2023-11-29 DIAGNOSIS — R19.8 ABDOMINAL WEAKNESS: Primary | ICD-10-CM

## 2023-11-29 PROCEDURE — 97530 THERAPEUTIC ACTIVITIES: CPT

## 2023-11-29 PROCEDURE — 97140 MANUAL THERAPY 1/> REGIONS: CPT

## 2023-11-29 PROCEDURE — 97112 NEUROMUSCULAR REEDUCATION: CPT

## 2023-11-29 NOTE — PATIENT INSTRUCTIONS
Home Program 11/29/23:    Continue exercises previously given -    Bridging    Lay on mat with knees bent. Tighten pelvic floor and deep abdominals, then tuck pelvis and lift hips up off table.  Do 10 times and perform 3 sets.     Sidelying leg lifts - lay on side, bottom leg bent, top leg straight. Top of pelvis rolled forward and leg back in line with trunk. Lift and lower top leg. 3 sets to fatigue.      Transverse Abdominus isolation        First, find neutral pelvis position. Gently tighten deep core muscles. Do not hold breath, may be easier to contract on an exhalation. Should feel a firming up and no outward or downward bulging.     Repeat 10 times. Perform 1 sets.     Then add trunk curls to above for 2 additional sets of 10, holding each for 3 seconds.    And add new exercises:    Planks -       Start with 3 of 10 second holds. Gradually progress the hold time.    Fire hydrants  On hands and knees bring one knee straight out to the side as you keep rest of body stable. Do 2 sets of 10 each side.      Rectus lean backs - sitting in chair or edge of bed, cross arms, engage core and lean back keeping spine straight. When you feel a challenge hold there 3 seconds and return to upright position. Do 2 sets of 10.

## 2023-11-29 NOTE — PROGRESS NOTES
Pelvic Health Physical Therapy   Treatment Note     Name: Loren Levin [place]  Clinic Number: [id number]    Therapy Diagnosis:   Encounter Diagnosis   Name Primary?    Abdominal weakness Yes     Physician: Amarilis James CNM    Visit Date: 11/29/2023    Physician Orders: PT Eval and Treat  Medical Diagnosis from Referral: pelvic floor dysfunction  Evaluation Date: 11/1/2023  Authorization Period Expiration: 10 visits  Plan of Care Expiration: 1/30/24  Visit # / Visits authorized: 3/10    Cancelled Visits: 0  No Show Visits: 0    Time In: 1105  Time Out: 1155  Total Billable Time: 50 minutes    Precautions: Standard    Subjective     Pt reports: Did a lot of hiking over Thanksgiving. Felt great, may have had a little pressure in pelvic floor but was carrying 31# toddler in backpack carrier. Was feeling this pressure by the end of hikes, but went away instantly. Had a 10 hour drive and scar felt tender at the end, no soreness the next day. Has not attempted intercourse. Has been working to engage core more functionally and that feels good and feels things kicking back in.   She was compliant with home exercise program.  Response to previous treatment: felt fine  Functional change: decreased heaviness    Pain: 0/10  Location:     Objective   NATALY - 2.5 fingers at and above umbilicus during curl up without engagement, reduces to one finger with curl with engagement  Pelvic floor strength: 3+/5 with good coordination of layers and complete deactivation  Scar - erythemic spot R posterior fourchette consistent with appearance of granulation tissue    Pt verbally consents to intravaginal treatment today.  Signed consent form already on file.    Loren received therapeutic exercises to develop  strength and endurance for 00 minutes including:  not performed today    Loren received the following manual therapy techniques: to develop flexibility and extensibility for 15 minutes including: trigger point/myofascial  release of pelvic floor mm and scar mobilization of perineal laceration   MFR to linea alba and medial and lateral rectus    Loren participated in neuromuscular re-education activities to develop Coordination, Control, and Proprioception for 25 minutes includin breathing   Contractions focus on layers  TrA isolation - assessed with palpation and TAUS  TrA + trunk curls  Eccentric rectus with seated trunk lean backs, 3 sec hold x 10 with palpation and VC for rectus  Assessed NATALY with curls, with and without TrA  Bridging with pelvic floor and TrA co-contract  SL hip abd cues for no pelvic hiking and isolation of glut med  Quadruped glut med fire hydrants with TrA engagement  Half planks with TrA engagement and palpation to monitor abd wall control    Loren participated in dynamic functional therapeutic activities to improve functional performance for 10 minutes, including:  Discussion of scar healing, potentially some granulation tissue and advised schedule f/u with CNM for further assessment  PT POC     Home Exercises Provided and Patient Education Provided     Education provided:   - anatomy/physiology of pelvic floor, diaphragmatic breathing, isometric abdominal exercises, and kegels  Discussed progression of plan of care with patient; educated pt in activity modification; reviewed HEP with pt. Pt demonstrated and verbalized understanding of all instruction and was provided with a handout of HEP (see Patient Instructions).    Written Home Exercises Provided: Patient instructed to cont prior HEP.  Exercises were reviewed and Loren was able to demonstrate them prior to the end of the session.  Loren demonstrated good  understanding of the education provided.     See EMR under Patient Instructions for exercises provided 2023.    Assessment     Checked scar, area of tissue to R aspect of posterior fourchette that possibly is granulation tissue. Recommend she schedule f/u with CNM for assessment.  Excellent demo of pelvic floor contractions with good coordination through the layers and complete deactivation. Continues slightly elevated tension at baseline rest position and pelvic floor engages maximally with TrA activity, so continue to recommend regular use of breathing for mm relaxation particularly after pelvic and core exercises. Progressed for core stability and hip strengthening. Pt is progressing very well and likely ready for DC at next visit.  Loren Is progressing well towards her goals.   Pt prognosis is Excellent.     Pt will continue to benefit from skilled outpatient physical therapy to address the deficits listed in the problem list box on initial evaluation, provide pt/family education and to maximize pt's level of independence in the home and community environment.     Pt's spiritual, cultural and educational needs considered and pt agreeable to plan of care and goals.     Anticipated barriers to physical therapy: none    Goals: Short Term Goals: 4 weeks   Pt indep in HEP  Pt indep in functional brace technique for decreased strain of pelvic structures with activities which increase IAP  Pt demo pelvic floor mm strength at least 3/5 for improved support of pelvic organs     Long Term Goals: 8 weeks   Pt indep in progressive HEP  Pt reports able to have painfree intercourse for improved participation in ADLs and improved intimacy with partner  Pt demo pelvic floor mm strength at least 3+/5 for improved support of pelvic organs  Pt able to return to regular yoga practice without limitation     Plan      Plan of care Certification: 11/1/2023 to 1/30/24.     Outpatient Physical Therapy 1 times weekly for 12 weeks to include the following interventions: therapeutic exercises, therapeutic activity, neuromuscular re-education, manual therapy, patient/family education and self care/home management    Next visit: check NATALY, review exercises, answer any questions    Christine Gonsales, PT

## 2023-12-05 ENCOUNTER — PATIENT MESSAGE (OUTPATIENT)
Dept: REHABILITATION | Facility: OTHER | Age: 38
End: 2023-12-05
Payer: COMMERCIAL

## 2023-12-06 ENCOUNTER — CLINICAL SUPPORT (OUTPATIENT)
Dept: REHABILITATION | Facility: OTHER | Age: 38
End: 2023-12-06
Attending: FAMILY MEDICINE
Payer: COMMERCIAL

## 2023-12-06 ENCOUNTER — OFFICE VISIT (OUTPATIENT)
Dept: OBSTETRICS AND GYNECOLOGY | Facility: CLINIC | Age: 38
End: 2023-12-06
Payer: COMMERCIAL

## 2023-12-06 VITALS
SYSTOLIC BLOOD PRESSURE: 106 MMHG | BODY MASS INDEX: 21.48 KG/M2 | WEIGHT: 125.13 LBS | DIASTOLIC BLOOD PRESSURE: 66 MMHG

## 2023-12-06 DIAGNOSIS — R19.8 ABDOMINAL WEAKNESS: Primary | ICD-10-CM

## 2023-12-06 DIAGNOSIS — Z30.09 ENCOUNTER FOR OTHER GENERAL COUNSELING AND ADVICE ON CONTRACEPTION: ICD-10-CM

## 2023-12-06 DIAGNOSIS — R52 PAIN RELATED TO VAGINAL DELIVERY: ICD-10-CM

## 2023-12-06 DIAGNOSIS — Z30.014: Primary | ICD-10-CM

## 2023-12-06 PROCEDURE — 3078F DIAST BP <80 MM HG: CPT | Mod: CPTII,S$GLB,,

## 2023-12-06 PROCEDURE — 99999 PR PBB SHADOW E&M-EST. PATIENT-LVL III: CPT | Mod: PBBFAC,,,

## 2023-12-06 PROCEDURE — 99213 PR OFFICE/OUTPT VISIT, EST, LEVL III, 20-29 MIN: ICD-10-PCS | Mod: S$GLB,,,

## 2023-12-06 PROCEDURE — 3008F BODY MASS INDEX DOCD: CPT | Mod: CPTII,S$GLB,,

## 2023-12-06 PROCEDURE — 97112 NEUROMUSCULAR REEDUCATION: CPT

## 2023-12-06 PROCEDURE — 3074F PR MOST RECENT SYSTOLIC BLOOD PRESSURE < 130 MM HG: ICD-10-PCS | Mod: CPTII,S$GLB,,

## 2023-12-06 PROCEDURE — 3074F SYST BP LT 130 MM HG: CPT | Mod: CPTII,S$GLB,,

## 2023-12-06 PROCEDURE — 99213 OFFICE O/P EST LOW 20 MIN: CPT | Mod: S$GLB,,,

## 2023-12-06 PROCEDURE — 1159F PR MEDICATION LIST DOCUMENTED IN MEDICAL RECORD: ICD-10-PCS | Mod: CPTII,S$GLB,,

## 2023-12-06 PROCEDURE — 97530 THERAPEUTIC ACTIVITIES: CPT

## 2023-12-06 PROCEDURE — 3078F PR MOST RECENT DIASTOLIC BLOOD PRESSURE < 80 MM HG: ICD-10-PCS | Mod: CPTII,S$GLB,,

## 2023-12-06 PROCEDURE — 99999 PR PBB SHADOW E&M-EST. PATIENT-LVL III: ICD-10-PCS | Mod: PBBFAC,,,

## 2023-12-06 PROCEDURE — 3008F PR BODY MASS INDEX (BMI) DOCUMENTED: ICD-10-PCS | Mod: CPTII,S$GLB,,

## 2023-12-06 PROCEDURE — 97140 MANUAL THERAPY 1/> REGIONS: CPT

## 2023-12-06 PROCEDURE — 1159F MED LIST DOCD IN RCRD: CPT | Mod: CPTII,S$GLB,,

## 2023-12-06 RX ORDER — ESTRADIOL 0.1 MG/G
CREAM VAGINAL
Qty: 42.5 G | Refills: 0 | Status: SHIPPED | OUTPATIENT
Start: 2023-12-06 | End: 2024-03-05

## 2023-12-06 NOTE — PLAN OF CARE
[facility]  Physical Therapy Discharge Note    Name: Loren Levin Springer  Clinic Number: [id number]    Therapy Diagnosis:   Encounter Diagnosis   Name Primary?    Abdominal weakness Yes     Physician: Amarilis James CNM    Physician Orders: PT Eval and Treat  Medical Diagnosis from Referral: pelvic floor dysfunction  Evaluation Date: 11/1/2023    Date of Last visit: 12/6/23  Total Visits Received: 4    ASSESSMENT      Myofascial release performed to PFM while keeping direct pressure away from posterior fourchette where cauterization was performed.  assessed and with slightly greater closure and demo good tensioning at midline. Reviewed her program and discussed progressions and returning to higher level exercises. Also discussed strategies for return to intercourse. She is indep with her program and symptom free. Feels ready for discharge at this time. Will f/u if has further PFPT needs in the future.   Loren Is progressing well towards her goals.    Discharge reason: Patient has met all of his/her goals    Discharge FOTO Score: n/a    Goals:   Short Term Goals: 4 weeks   Pt indep in HEP MET  Pt indep in functional brace technique for decreased strain of pelvic structures with activities which increase IAP MET  Pt demo pelvic floor mm strength at least 3/5 for improved support of pelvic organs MET     Long Term Goals: 8 weeks   Pt indep in progressive HEP MET  Pt reports able to have painfree intercourse for improved participation in ADLs and improved intimacy with partner NOT MET - has not tried yet, but reviewed strategies for return to sexual activity  Pt demo pelvic floor mm strength at least 3+/5 for improved support of pelvic organs MET  Pt able to return to regular yoga practice without limitation NOT MET - has not tried yet, but reviewed safe return to exercise    PLAN   This patient is discharged from Physical Therapy    Christine Gonsales, PT

## 2023-12-06 NOTE — PROGRESS NOTES
CC: Here today to have vaginal laceration site checked. Still experiencing some pain, and her PT noted that she may have some granulation tissue that should be looked at.     Pelvic exam:  Normal female hair distribution.  Normal external genitalia without lesions.  Adequate perineal body. Urethral meatus without lesions or prolapse.  Vaginal mucosa moist and pink, normal rugae, without lesions, or foul odor. White discharge. Site from laceration pink, raw, with some granulation tissue. Tender to palpation. Silver nitrate applied. Tolerated well.     Assessment/plan    Silver nitrate applied to area of poor healing. F/u again as needed  Vaginal estrace rx     Contraception:  --desires IUD; device authorization ordered  --f/u with appointment for insertion      Maryellen Guerra CNM

## 2023-12-06 NOTE — PROGRESS NOTES
Pelvic Health Physical Therapy   Treatment Note     Name: Loren Levin [place]  Clinic Number: [id number]    Therapy Diagnosis:   Encounter Diagnosis   Name Primary?    Abdominal weakness Yes     Physician: Amarilis James CNM    Visit Date: 2023    Physician Orders: PT Eval and Treat  Medical Diagnosis from Referral: pelvic floor dysfunction  Evaluation Date: 2023  Authorization Period Expiration: 10 visits  Plan of Care Expiration: 24  Visit # / Visits authorized: 4/10    Cancelled Visits: 0  No Show Visits: 0    Time In: 1100  Time Out: 1200  Total Billable Time: 60 minutes    Precautions: Standard    Subjective     Pt reports: Just saw midwives and had silver nitrate done. They prescribed estrogen cream as well.   She was compliant with home exercise program.  Response to previous treatment: felt fine  Functional change: decreased heaviness    Pain: 0/10  Location:     Objective   NATALY - 2.5 fingers at and above umbilicus during curl up without engagement, reduces to one finger with curl with engagement  Pelvic floor strength: 3+/5 with good coordination of layers and complete deactivation  Scar - erythemic spot R posterior fourchette consistent with appearance of granulation tissue    Pt verbally consents to intravaginal treatment today.  Signed consent form already on file.    Loren received therapeutic exercises to develop  strength and endurance for 00 minutes including:  not performed today    Loren received the following manual therapy techniques: to develop flexibility and extensibility for 10 minutes including: trigger point/myofascial release of pelvic floor mm and scar mobilization of perineal laceration   MFR to linea alba and medial and lateral rectus    Loren participated in neuromuscular re-education activities to develop Coordination, Control, and Proprioception for 40 minutes includin breathing   Contractions focus on layers  TrA isolation - assessed with  palpation and TAUS  TrA + trunk curls  Eccentric rectus with seated trunk lean backs, 3 sec hold x 10 with palpation and VC for rectus  Assessed NATALY with curls, with and without TrA  Bridging with pelvic floor and TrA co-contract  SL hip abd cues for no pelvic hiking and isolation of glut med  Quadruped glut med fire hydrants with TrA engagement > fire hydrants/donkey kick sequence  Half planks with TrA engagement and palpation to monitor abd wall control    Loren participated in dynamic functional therapeutic activities to improve functional performance for 10 minutes, including:  Discussion of scar healing - once cauterization heals start scar massage  Strategies for intercourse - relaxation beforehand, use of lubricant, arousal prior to insertion    Home Exercises Provided and Patient Education Provided     Education provided:   - anatomy/physiology of pelvic floor, diaphragmatic breathing, isometric abdominal exercises, and kegels  Discussed progression of plan of care with patient; educated pt in activity modification; reviewed HEP with pt. Pt demonstrated and verbalized understanding of all instruction and was provided with a handout of HEP (see Patient Instructions).    Written Home Exercises Provided: Patient instructed to cont prior HEP.  Exercises were reviewed and Loren was able to demonstrate them prior to the end of the session.  Loren demonstrated good  understanding of the education provided.     See EMR under Patient Instructions for exercises provided 11/8/2023.    Assessment   Myofascial release performed to PFM while keeping direct pressure away from posterior fourchette where cauterization was performed.  assessed and with slightly greater closure and demo good tensioning at midline. Reviewed her program and discussed progressions and returning to higher level exercises. Also discussed strategies for return to intercourse. She is indep with her program and symptom free. Feels ready for  discharge at this time. Will f/u if has further PFPT needs in the future.   Loren Is progressing well towards her goals.   Pt prognosis is Excellent.     Pt will continue to benefit from skilled outpatient physical therapy to address the deficits listed in the problem list box on initial evaluation, provide pt/family education and to maximize pt's level of independence in the home and community environment.     Pt's spiritual, cultural and educational needs considered and pt agreeable to plan of care and goals.     Anticipated barriers to physical therapy: none    Goals: Short Term Goals: 4 weeks   Pt indep in HEP MET  Pt indep in functional brace technique for decreased strain of pelvic structures with activities which increase IAP MET  Pt demo pelvic floor mm strength at least 3/5 for improved support of pelvic organs MET     Long Term Goals: 8 weeks   Pt indep in progressive HEP MET  Pt reports able to have painfree intercourse for improved participation in ADLs and improved intimacy with partner NOT MET - has not tried yet, but reviewed strategies for return to sexual activity  Pt demo pelvic floor mm strength at least 3+/5 for improved support of pelvic organs MET  Pt able to return to regular yoga practice without limitation NOT MET - has not tried yet, but reviewed safe return to exercise     Plan      Plan: Discharge    Christine Gonsales, PT

## 2023-12-06 NOTE — PATIENT INSTRUCTIONS
Home Program 12/6/23:    Home Program 11/29/23:    Continue exercises previously given -    Bridging    Lay on mat with knees bent. Tighten pelvic floor and deep abdominals, then tuck pelvis and lift hips up off table.  Do 10 times and perform 3 sets.  Can progress with marching at the top     Sidelying leg lifts - lay on side, bottom leg bent, top leg straight. Top of pelvis rolled forward and leg back in line with trunk. Lift and lower top leg. 3 sets to fatigue.      Transverse Abdominus isolation        First, find neutral pelvis position. Gently tighten deep core muscles. Do not hold breath, may be easier to contract on an exhalation. Should feel a firming up and no outward or downward bulging.     Repeat 10 times. Perform 1 sets.     Then add trunk curls to above for 2 additional sets of 10, holding each for 3 seconds.    And add new exercises:    Planks -       Start with 3 of 10 second holds. Gradually progress the hold time.    Fire hydrants  On hands and knees bring one knee straight out to the side as you keep rest of body stable. Do 2 sets of 10 each side.      Can progress by adding donkey kick>fire hydrant>down>fire hydrant> donkey kick> down    Rectus lean backs - sitting in chair or edge of bed, cross arms, engage core and lean back keeping spine straight. When you feel a challenge hold there 3 seconds and return to upright position. Do 2 sets of 10.

## 2023-12-11 ENCOUNTER — PATIENT MESSAGE (OUTPATIENT)
Dept: OBSTETRICS AND GYNECOLOGY | Facility: CLINIC | Age: 38
End: 2023-12-11
Payer: COMMERCIAL

## 2023-12-12 ENCOUNTER — PATIENT MESSAGE (OUTPATIENT)
Dept: OBSTETRICS AND GYNECOLOGY | Facility: CLINIC | Age: 38
End: 2023-12-12
Payer: COMMERCIAL

## 2023-12-12 ENCOUNTER — TELEPHONE (OUTPATIENT)
Dept: OBSTETRICS AND GYNECOLOGY | Facility: CLINIC | Age: 38
End: 2023-12-12
Payer: COMMERCIAL

## 2023-12-12 NOTE — TELEPHONE ENCOUNTER
Pt called to schedule IUD placement. I scheduled her for Monday December 18, 2023 @ 2;20pm. Pt agreed with date and time.

## 2023-12-15 ENCOUNTER — PATIENT MESSAGE (OUTPATIENT)
Dept: OBSTETRICS AND GYNECOLOGY | Facility: CLINIC | Age: 38
End: 2023-12-15
Payer: COMMERCIAL

## 2023-12-15 ENCOUNTER — TELEPHONE (OUTPATIENT)
Dept: OBSTETRICS AND GYNECOLOGY | Facility: CLINIC | Age: 38
End: 2023-12-15
Payer: COMMERCIAL

## 2023-12-15 NOTE — TELEPHONE ENCOUNTER
Called patient and left [facility] phone number to call at [phone] to reschedule Monday's December 18th appointment.

## 2023-12-18 ENCOUNTER — PATIENT MESSAGE (OUTPATIENT)
Dept: OBSTETRICS AND GYNECOLOGY | Facility: CLINIC | Age: 38
End: 2023-12-18
Payer: COMMERCIAL

## 2024-02-08 ENCOUNTER — OFFICE VISIT (OUTPATIENT)
Dept: FAMILY MEDICINE | Facility: CLINIC | Age: 39
End: 2024-02-08
Attending: FAMILY MEDICINE
Payer: COMMERCIAL

## 2024-02-08 ENCOUNTER — LAB VISIT (OUTPATIENT)
Dept: LAB | Facility: HOSPITAL | Age: 39
End: 2024-02-08
Attending: FAMILY MEDICINE
Payer: COMMERCIAL

## 2024-02-08 VITALS
DIASTOLIC BLOOD PRESSURE: 76 MMHG | HEART RATE: 88 BPM | OXYGEN SATURATION: 99 % | WEIGHT: 119.19 LBS | SYSTOLIC BLOOD PRESSURE: 118 MMHG | BODY MASS INDEX: 20.46 KG/M2

## 2024-02-08 DIAGNOSIS — Z00.00 ANNUAL PHYSICAL EXAM: ICD-10-CM

## 2024-02-08 DIAGNOSIS — F41.1 ANXIETY, GENERALIZED: ICD-10-CM

## 2024-02-08 DIAGNOSIS — Z00.00 ANNUAL PHYSICAL EXAM: Primary | ICD-10-CM

## 2024-02-08 LAB
ALBUMIN SERPL BCP-MCNC: 4.2 G/DL (ref 3.5–5.2)
ALP SERPL-CCNC: 88 U/L (ref 55–135)
ALT SERPL W/O P-5'-P-CCNC: 25 U/L (ref 10–44)
ANION GAP SERPL CALC-SCNC: 13 MMOL/L (ref 8–16)
AST SERPL-CCNC: 57 U/L (ref 10–40)
BACTERIA #/AREA URNS AUTO: NORMAL /HPF
BASOPHILS # BLD AUTO: 0.03 K/UL (ref 0–0.2)
BASOPHILS NFR BLD: 0.5 % (ref 0–1.9)
BILIRUB SERPL-MCNC: 0.6 MG/DL (ref 0.1–1)
BILIRUB UR QL STRIP: NEGATIVE
BUN SERPL-MCNC: 22 MG/DL (ref 6–20)
CALCIUM SERPL-MCNC: 9.6 MG/DL (ref 8.7–10.5)
CHLORIDE SERPL-SCNC: 105 MMOL/L (ref 95–110)
CLARITY UR REFRACT.AUTO: ABNORMAL
CO2 SERPL-SCNC: 21 MMOL/L (ref 23–29)
COLOR UR AUTO: YELLOW
CREAT SERPL-MCNC: 0.7 MG/DL (ref 0.5–1.4)
DIFFERENTIAL METHOD BLD: NORMAL
EOSINOPHIL # BLD AUTO: 0.1 K/UL (ref 0–0.5)
EOSINOPHIL NFR BLD: 2.1 % (ref 0–8)
ERYTHROCYTE [DISTWIDTH] IN BLOOD BY AUTOMATED COUNT: 12.2 % (ref 11.5–14.5)
EST. GFR  (NO RACE VARIABLE): >60 ML/MIN/1.73 M^2
ESTIMATED AVG GLUCOSE: 94 MG/DL (ref 68–131)
GLUCOSE SERPL-MCNC: 75 MG/DL (ref 70–110)
GLUCOSE UR QL STRIP: NEGATIVE
HBA1C MFR BLD: 4.9 % (ref 4–5.6)
HCT VFR BLD AUTO: 43.6 % (ref 37–48.5)
HGB BLD-MCNC: 14.6 G/DL (ref 12–16)
HGB UR QL STRIP: NEGATIVE
IMM GRANULOCYTES # BLD AUTO: 0.01 K/UL (ref 0–0.04)
IMM GRANULOCYTES NFR BLD AUTO: 0.2 % (ref 0–0.5)
KETONES UR QL STRIP: NEGATIVE
LEUKOCYTE ESTERASE UR QL STRIP: ABNORMAL
LYMPHOCYTES # BLD AUTO: 2 K/UL (ref 1–4.8)
LYMPHOCYTES NFR BLD: 32.2 % (ref 18–48)
MCH RBC QN AUTO: 30.7 PG (ref 27–31)
MCHC RBC AUTO-ENTMCNC: 33.5 G/DL (ref 32–36)
MCV RBC AUTO: 92 FL (ref 82–98)
MICROSCOPIC COMMENT: NORMAL
MONOCYTES # BLD AUTO: 0.5 K/UL (ref 0.3–1)
MONOCYTES NFR BLD: 7.5 % (ref 4–15)
NEUTROPHILS # BLD AUTO: 3.5 K/UL (ref 1.8–7.7)
NEUTROPHILS NFR BLD: 57.5 % (ref 38–73)
NITRITE UR QL STRIP: NEGATIVE
NRBC BLD-RTO: 0 /100 WBC
PH UR STRIP: 7 [PH] (ref 5–8)
PLATELET # BLD AUTO: 261 K/UL (ref 150–450)
PMV BLD AUTO: 11.6 FL (ref 9.2–12.9)
POTASSIUM SERPL-SCNC: 4.2 MMOL/L (ref 3.5–5.1)
PROT SERPL-MCNC: 7.4 G/DL (ref 6–8.4)
PROT UR QL STRIP: NEGATIVE
RBC # BLD AUTO: 4.75 M/UL (ref 4–5.4)
RBC #/AREA URNS AUTO: 1 /HPF (ref 0–4)
SODIUM SERPL-SCNC: 139 MMOL/L (ref 136–145)
SP GR UR STRIP: 1.01 (ref 1–1.03)
SQUAMOUS #/AREA URNS AUTO: 7 /HPF
TSH SERPL DL<=0.005 MIU/L-ACNC: 1.33 UIU/ML (ref 0.4–4)
URN SPEC COLLECT METH UR: ABNORMAL
WBC # BLD AUTO: 6.12 K/UL (ref 3.9–12.7)
WBC #/AREA URNS AUTO: 4 /HPF (ref 0–5)

## 2024-02-08 PROCEDURE — 1159F MED LIST DOCD IN RCRD: CPT | Mod: CPTII,S$GLB,, | Performed by: FAMILY MEDICINE

## 2024-02-08 PROCEDURE — 80053 COMPREHEN METABOLIC PANEL: CPT | Performed by: FAMILY MEDICINE

## 2024-02-08 PROCEDURE — 3008F BODY MASS INDEX DOCD: CPT | Mod: CPTII,S$GLB,, | Performed by: FAMILY MEDICINE

## 2024-02-08 PROCEDURE — 99999 PR PBB SHADOW E&M-EST. PATIENT-LVL III: CPT | Mod: PBBFAC,,, | Performed by: FAMILY MEDICINE

## 2024-02-08 PROCEDURE — 3044F HG A1C LEVEL LT 7.0%: CPT | Mod: CPTII,S$GLB,, | Performed by: FAMILY MEDICINE

## 2024-02-08 PROCEDURE — 3074F SYST BP LT 130 MM HG: CPT | Mod: CPTII,S$GLB,, | Performed by: FAMILY MEDICINE

## 2024-02-08 PROCEDURE — 99395 PREV VISIT EST AGE 18-39: CPT | Mod: S$GLB,,, | Performed by: FAMILY MEDICINE

## 2024-02-08 PROCEDURE — 36415 COLL VENOUS BLD VENIPUNCTURE: CPT | Mod: PO | Performed by: FAMILY MEDICINE

## 2024-02-08 PROCEDURE — 3078F DIAST BP <80 MM HG: CPT | Mod: CPTII,S$GLB,, | Performed by: FAMILY MEDICINE

## 2024-02-08 PROCEDURE — 81001 URINALYSIS AUTO W/SCOPE: CPT | Performed by: FAMILY MEDICINE

## 2024-02-08 PROCEDURE — 84443 ASSAY THYROID STIM HORMONE: CPT | Performed by: FAMILY MEDICINE

## 2024-02-08 PROCEDURE — 85025 COMPLETE CBC W/AUTO DIFF WBC: CPT | Performed by: FAMILY MEDICINE

## 2024-02-08 PROCEDURE — 83036 HEMOGLOBIN GLYCOSYLATED A1C: CPT | Performed by: FAMILY MEDICINE

## 2024-02-08 RX ORDER — HYDROXYZINE HYDROCHLORIDE 25 MG/1
25 TABLET, FILM COATED ORAL NIGHTLY PRN
Qty: 30 TABLET | Refills: 0 | Status: SHIPPED | OUTPATIENT
Start: 2024-02-08 | End: 2024-03-11

## 2024-02-08 RX ORDER — ESCITALOPRAM OXALATE 5 MG/1
5 TABLET ORAL DAILY
Qty: 90 TABLET | Refills: 3 | Status: SHIPPED | OUTPATIENT
Start: 2024-02-08

## 2024-02-08 RX ORDER — ESCITALOPRAM OXALATE 5 MG/1
5 TABLET ORAL
Qty: 30 TABLET | Refills: 2 | OUTPATIENT
Start: 2024-02-08

## 2024-02-08 NOTE — TELEPHONE ENCOUNTER
Care Due:                  Date            Visit Type   Department     Provider  --------------------------------------------------------------------------------                                EP -                              PRIMARY      [facility]  Last Visit: 08-      CARE ([facility])   MEDICINE       Esther Brunson  Next Visit: None Scheduled  None         None Found                                                            Last  Test          Frequency    Reason                     Performed    Due Date  --------------------------------------------------------------------------------    Office Visit  15 months..  EScitalopram.............  08-   11-    [facility] Embedded Care Due Messages. Reference number: [id number].   2/08/2024 12:36:54 AM CST

## 2024-02-19 NOTE — PROGRESS NOTES
Subjective:       Patient ID: Loren Dickerson is a 38 y.o. female.    Chief Complaint: Annual Exam    HPI  Pt is here for annual exam pt is generally well no sob/cp no cough chest congestion no sore throat uri symptoms  Pt denies dysuria hematuria no abnl vaginal bleeding  Pt denies n/v/f/c/d/c no change in bowel habits   Pt has anxiety stable on meds no si/hi no panic attacks  Review of Systems   Constitutional:  Negative for activity change, chills, diaphoresis, fatigue and fever.   HENT:  Negative for congestion, ear discharge, ear pain, hearing loss, postnasal drip, rhinorrhea, sinus pressure, sneezing, sore throat, trouble swallowing and voice change.    Eyes:  Negative for photophobia, discharge, redness, itching and visual disturbance.   Respiratory:  Negative for cough, chest tightness, shortness of breath and wheezing.    Cardiovascular:  Negative for chest pain, palpitations and leg swelling.   Gastrointestinal:  Negative for abdominal pain, anal bleeding, blood in stool, constipation, diarrhea, nausea, rectal pain and vomiting.   Genitourinary:  Negative for dyspareunia, dysuria, flank pain, frequency, hematuria, menstrual problem, pelvic pain, urgency, vaginal bleeding and vaginal discharge.   Musculoskeletal:  Negative for arthralgias, back pain, joint swelling and neck pain.   Skin:  Negative for color change and rash.   Neurological:  Negative for dizziness, speech difficulty, weakness, light-headedness, numbness and headaches.   Hematological:  Does not bruise/bleed easily.   Psychiatric/Behavioral:  Negative for agitation, confusion, decreased concentration, sleep disturbance and suicidal ideas. The patient is not nervous/anxious.      Objective:      Physical Exam  Constitutional:       Appearance: Normal appearance. She is well-developed.   HENT:      Head: Normocephalic and atraumatic.      Right Ear: External ear normal.      Left Ear: External ear normal.      Nose: Nose normal.   Eyes:  "     General:         Right eye: No discharge.         Left eye: No discharge.      Conjunctiva/sclera: Conjunctivae normal.      Pupils: Pupils are equal, round, and reactive to light.   Neck:      Thyroid: No thyromegaly.   Cardiovascular:      Rate and Rhythm: Normal rate and regular rhythm.      Heart sounds: Normal heart sounds. No murmur heard.     No friction rub. No gallop.   Pulmonary:      Effort: Pulmonary effort is normal.      Breath sounds: Normal breath sounds. No wheezing or rales.   Abdominal:      General: Bowel sounds are normal. There is no distension.      Palpations: Abdomen is soft.      Tenderness: There is no abdominal tenderness. There is no guarding or rebound.   Genitourinary:     Vagina: Normal.      Comments: declined  Musculoskeletal:         General: No tenderness. Normal range of motion.      Cervical back: Normal range of motion and neck supple.   Lymphadenopathy:      Cervical: No cervical adenopathy.   Skin:     General: Skin is warm and dry.      Findings: No erythema or rash.   Neurological:      General: No focal deficit present.      Mental Status: She is alert and oriented to person, place, and time.      Cranial Nerves: No cranial nerve deficit.      Motor: No abnormal muscle tone.      Coordination: Coordination normal.   Psychiatric:         Mood and Affect: Mood normal.         Behavior: Behavior normal.         Thought Content: Thought content normal.         Judgment: Judgment normal.       Assessment:       1. Annual physical exam    2. Anxiety, generalized        Plan:     Orders cbc cmp lipid tsh urine   Cont meds  Low fat diet  Avoid caffeine and etoh  Increase water intake  Relaxation techniques  Rtc 1 month  F/u psych    Health maintenance  Discussed with pt        "This note will not be shared with the patient."     "

## 2024-03-11 RX ORDER — HYDROXYZINE HYDROCHLORIDE 25 MG/1
25 TABLET, FILM COATED ORAL NIGHTLY PRN
Qty: 90 TABLET | Refills: 1 | Status: SHIPPED | OUTPATIENT
Start: 2024-03-11

## 2024-08-05 RX ORDER — ESCITALOPRAM OXALATE 5 MG/1
5 TABLET ORAL DAILY
Qty: 90 TABLET | Refills: 3 | Status: SHIPPED | OUTPATIENT
Start: 2024-08-05

## 2024-08-08 ENCOUNTER — PATIENT MESSAGE (OUTPATIENT)
Dept: OBSTETRICS AND GYNECOLOGY | Facility: CLINIC | Age: 39
End: 2024-08-08
Payer: COMMERCIAL

## 2024-09-04 NOTE — H&P
[facility] - Labor & Delivery  Obstetrics  History & Physical    Patient Name: Loren Dickerson  MRN: [id number]  Admission Date: 2023  Primary Care Provider: Esther Brunson MD    Subjective:     Principal Problem:<principal problem not specified>    History of Present Illness:  Loren Dickerson is a 38 y.o. female  at 39w6d with Estimated Date of Delivery: 23 who presents to [facility] accompanied by Ryan.      Reports leakage of clear fluid since 0430 this morning. mild contractions, active fetal movement, No vaginal bleeding , Yes loss of fluid.      Last ate pancakes at 1000, last drank water at bedside.      This pregnancy has been complicated by LTCS (arrest of descent, chorioamnionitis), postpartum anxiety, AMA, and resolved placenta previa.        Presentation:   Cephalic by BSUS  Estimated Fetal Weight: 7 lb 7 oz     Birth Center Risk Assessment: 1-management on labor and Delivery (TOLAC, PROM)     CNM management in [facility]  CNM management on L&D  Consultation with OB to develop  plan of care  Collaborative CNM/OB management with delivery on L&D              4-   Permanent referral of care to MD      Obstetric HPI:  Patient reports no perceived contractions, active fetal movement, No vaginal bleeding , Yes loss of fluid     This pregnancy has been complicated by LTCS, elevated LFT, resolved placenta previa, and history of chorioamnionitis    OB History    Para Term  AB Living   3 1 1 0 1 1   SAB IAB Ectopic Multiple Live Births   0 1 0 0 1      # Outcome Date GA Lbr Rayo/2nd Weight Sex Delivery Anes PTL Lv   3 Current            2 Term 21 41w3d  3.56 kg (7 lb 13.6 oz) F CS-LTranv EPI N JAMIE      Complications: Chorioamnionitis      Name: GUILLERMO,GIRL LOREN      Apgar1: 4  Apgar5: 7   1 IAB 07/12/10 6w0d             Obstetric Comments   Menarche age 12     Past Medical History:   Diagnosis Date    Migraines 2020    Only during the beginning of pregnancy    PROM  (premature rupture of membranes) 2021     Past Surgical History:   Procedure Laterality Date     SECTION N/A 2021    Procedure:  SECTION;  Surgeon: Jacquelyn Jesus MD;  [facility];  Service: OB/GYN;  Laterality: N/A;     SECTION      WISDOM TOOTH EXTRACTION      No complications       PTA Medications   Medication Sig    EScitalopram oxalate (LEXAPRO) 5 MG Tab TAKE 1 TABLET BY MOUTH EVERY DAY    LORazepam (ATIVAN) 0.5 MG tablet Take 0.5 mg by mouth daily as needed.    PNV-DHA 27 mg iron-1 mg -300 mg Cap TAKE 1 CAPSULE BY MOUTH ONCE DAILY AT 6AM.       Review of patient's allergies indicates:  No Known Allergies     Family History       Problem Relation (Age of Onset)    Emphysema Maternal Grandmother, Paternal Grandfather    Fibromyalgia Mother    Hypertension Mother    No Known Problems Father, Sister, Maternal Grandfather, Paternal Grandmother, Sister          Tobacco Use    Smoking status: Never    Smokeless tobacco: Never   Substance and Sexual Activity    Alcohol use: Not Currently     Comment: intermittent few times month, 1-5 servings at a time    Drug use: Yes     Types: Marijuana     Comment: past, none during pregnancy    Sexual activity: Yes     Partners: Male     Birth control/protection: None     Comment: Ryan     Review of Systems   Constitutional:  Negative for diaphoresis and fever.   Gastrointestinal:  Negative for abdominal pain.   Genitourinary:  Positive for vaginal discharge.        Leakage of fluid from vagina      Objective:     Vital Signs (Most Recent):  Temp: 98.3 °F (36.8 °C) (23 1054)  Pulse: 83 (23 1109)  Resp: 18 (23 1109)  BP: 131/89 (23 1109)  SpO2: 99 % (23 1109) Vital Signs (24h Range):  Temp:  [98.3 °F (36.8 °C)] 98.3 °F (36.8 °C)  Pulse:  [73-85] 83  Resp:  [18] 18  SpO2:  [97 %-99 %] 99 %  BP: (130-140)/(89-95) 131/89     Weight: 71.1 kg (156 lb 12 oz)  Body mass index is 26.91  kg/m².    FHT: 140 Cat 1 (reassuring)  TOCO:  Q 2-3 minutes     Physical Exam:   Constitutional: She is oriented to person, place, and time. She appears well-developed and well-nourished.    HENT:   Head: Normocephalic and atraumatic.    Eyes: Pupils are equal, round, and reactive to light. EOM are normal.     Cardiovascular:  Normal rate.             Pulmonary/Chest: Effort normal.        Abdominal: Soft.     Genitourinary:    Vagina normal.      Genitourinary Comments: + nitrazine, + ferning on SSE             Musculoskeletal: Normal range of motion.       Neurological: She is alert and oriented to person, place, and time.    Skin: Skin is warm and dry.    Psychiatric: She has a normal mood and affect. Her behavior is normal. Judgment and thought content normal.        Cervix:  Dilation:  3  Effacement:  70  Station: -2   Presentation: Vertex by BSUS     Significant Labs:  Lab Results   Component Value Date    GROUPTRH O POS 2023    HEPBSAG Non-reactive 2023    STREPBCULT No Group B Streptococcus isolated 2023       I have personallly reviewed all pertinent lab results from the last 24 hours.  Recent Lab Results       None          Assessment/Plan:     38 y.o. female  at 39w6d for:    Elevated blood pressure affecting pregnancy in third trimester, antepartum  Pre-E labs  Mild range BP in [facility]    Full-term premature rupture of membranes  PROM @ 0430, clear fluid  CEFM  +nitrazine, + ferning on SSE  3/70/-2 @1100  Expectant management until recheck SVE at 1500    History of low transverse  section - considering TOLAC  CEFM  Clear liquid diet  Desires epidural during labor (unsure of when)  Admission  calculator 55.7%  Dr. Alfonso updated on patient status    Elevated liver enzymes  CMP on admit        Margarita Ryan CNM  Obstetrics  [facility] - Labor & Delivery       no

## 2024-10-21 ENCOUNTER — PATIENT MESSAGE (OUTPATIENT)
Dept: OBSTETRICS AND GYNECOLOGY | Facility: CLINIC | Age: 39
End: 2024-10-21
Payer: COMMERCIAL

## 2024-10-21 DIAGNOSIS — Z30.011 ENCOUNTER FOR INITIAL PRESCRIPTION OF CONTRACEPTIVE PILLS: Primary | ICD-10-CM

## 2024-10-21 RX ORDER — DROSPIRENONE AND ETHINYL ESTRADIOL 0.02-3(28)
1 KIT ORAL DAILY
Qty: 30 TABLET | Refills: 11 | Status: SHIPPED | OUTPATIENT
Start: 2024-10-21 | End: 2024-10-22

## 2024-10-22 RX ORDER — NORETHINDRONE 0.35 MG/1
1 TABLET ORAL DAILY
Qty: 30 TABLET | Refills: 11 | Status: SHIPPED | OUTPATIENT
Start: 2024-10-22 | End: 2025-10-22

## 2024-10-24 RX ORDER — ASCORBIC ACID, CHOLECALCIFEROL, .ALPHA.-TOCOPHEROL ACETATE, DL-, PYRIDOXINE, FOLIC ACID, CYANOCOBALAMIN, CALCIUM, FERROUS FUMARATE, MAGNESIUM, DOCONEXENT 85; 200; 10; 25; 1; 12; 140; 27; 45; 300 [IU]/1; [IU]/1; [IU]/1; [IU]/1; MG/1; UG/1; MG/1; MG/1; MG/1; MG/1
1 CAPSULE, GELATIN COATED ORAL
Qty: 30 CAPSULE | Refills: 35 | Status: SHIPPED | OUTPATIENT
Start: 2024-10-24 | End: 2025-10-24

## 2024-10-24 RX ORDER — ASCORBIC ACID, CHOLECALCIFEROL, .ALPHA.-TOCOPHEROL ACETATE, DL-, PYRIDOXINE, FOLIC ACID, CYANOCOBALAMIN, CALCIUM, FERROUS FUMARATE, MAGNESIUM, DOCONEXENT 85; 200; 10; 25; 1; 12; 140; 27; 45; 300 [IU]/1; [IU]/1; [IU]/1; [IU]/1; MG/1; UG/1; MG/1; MG/1; MG/1; MG/1
1 CAPSULE, GELATIN COATED ORAL DAILY
Qty: 30 CAPSULE | Refills: 3 | Status: SHIPPED | OUTPATIENT
Start: 2024-10-24 | End: 2024-10-24

## 2024-11-13 ENCOUNTER — PATIENT MESSAGE (OUTPATIENT)
Dept: FAMILY MEDICINE | Facility: CLINIC | Age: 39
End: 2024-11-13
Payer: COMMERCIAL

## 2024-11-13 ENCOUNTER — PATIENT MESSAGE (OUTPATIENT)
Dept: OBSTETRICS AND GYNECOLOGY | Facility: CLINIC | Age: 39
End: 2024-11-13
Payer: COMMERCIAL

## 2025-02-06 ENCOUNTER — PATIENT MESSAGE (OUTPATIENT)
Dept: DERMATOLOGY | Facility: CLINIC | Age: 40
End: 2025-02-06

## 2025-02-10 ENCOUNTER — PATIENT MESSAGE (OUTPATIENT)
Dept: OBSTETRICS AND GYNECOLOGY | Facility: CLINIC | Age: 40
End: 2025-02-10
Payer: COMMERCIAL

## 2025-02-10 RX ORDER — ASCORBIC ACID, CHOLECALCIFEROL, .ALPHA.-TOCOPHEROL ACETATE, DL-, PYRIDOXINE, FOLIC ACID, CYANOCOBALAMIN, CALCIUM, FERROUS FUMARATE, MAGNESIUM, DOCONEXENT 85; 200; 10; 25; 1; 12; 140; 27; 45; 300 [IU]/1; [IU]/1; [IU]/1; [IU]/1; MG/1; UG/1; MG/1; MG/1; MG/1; MG/1
1 CAPSULE, GELATIN COATED ORAL DAILY
Qty: 30 CAPSULE | Refills: 11 | Status: SHIPPED | OUTPATIENT
Start: 2025-02-10 | End: 2026-02-10

## 2025-03-12 DIAGNOSIS — Z12.31 OTHER SCREENING MAMMOGRAM: ICD-10-CM

## 2025-08-12 ENCOUNTER — PATIENT MESSAGE (OUTPATIENT)
Dept: FAMILY MEDICINE | Facility: CLINIC | Age: 40
End: 2025-08-12
Payer: COMMERCIAL

## 2025-08-12 DIAGNOSIS — F41.1 ANXIETY, GENERALIZED: Primary | ICD-10-CM

## 2025-08-14 RX ORDER — ESCITALOPRAM OXALATE 10 MG/1
10 TABLET ORAL DAILY
Qty: 90 TABLET | Refills: 3 | Status: SHIPPED | OUTPATIENT
Start: 2025-08-14 | End: 2025-08-14 | Stop reason: SDUPTHER

## 2025-08-14 RX ORDER — ESCITALOPRAM OXALATE 5 MG/1
5 TABLET ORAL DAILY
Qty: 90 TABLET | Refills: 3 | Status: SHIPPED | OUTPATIENT
Start: 2025-08-14

## 2025-08-14 RX ORDER — ESCITALOPRAM OXALATE 5 MG/1
5 TABLET ORAL
Qty: 30 TABLET | Refills: 26 | Status: SHIPPED | OUTPATIENT
Start: 2025-08-14 | End: 2025-08-14 | Stop reason: SDUPTHER